# Patient Record
Sex: FEMALE | Race: WHITE | NOT HISPANIC OR LATINO | Employment: FULL TIME | URBAN - METROPOLITAN AREA
[De-identification: names, ages, dates, MRNs, and addresses within clinical notes are randomized per-mention and may not be internally consistent; named-entity substitution may affect disease eponyms.]

---

## 2017-05-24 ENCOUNTER — OFFICE VISIT (OUTPATIENT)
Dept: FAMILY MEDICINE CLINIC | Facility: CLINIC | Age: 22
End: 2017-05-24

## 2017-05-24 VITALS
BODY MASS INDEX: 42.81 KG/M2 | HEIGHT: 68 IN | DIASTOLIC BLOOD PRESSURE: 82 MMHG | WEIGHT: 282.5 LBS | OXYGEN SATURATION: 98 % | HEART RATE: 72 BPM | SYSTOLIC BLOOD PRESSURE: 122 MMHG | RESPIRATION RATE: 16 BRPM

## 2017-05-24 DIAGNOSIS — F33.2 SEVERE EPISODE OF RECURRENT MAJOR DEPRESSIVE DISORDER, WITHOUT PSYCHOTIC FEATURES (HCC): Primary | ICD-10-CM

## 2017-05-24 PROCEDURE — 99213 OFFICE O/P EST LOW 20 MIN: CPT | Performed by: INTERNAL MEDICINE

## 2017-05-24 RX ORDER — DULOXETIN HYDROCHLORIDE 30 MG/1
30 CAPSULE, DELAYED RELEASE ORAL DAILY
Qty: 30 CAPSULE | Refills: 0 | Status: SHIPPED | OUTPATIENT
Start: 2017-05-24 | End: 2017-05-31

## 2017-05-31 ENCOUNTER — OFFICE VISIT (OUTPATIENT)
Dept: FAMILY MEDICINE CLINIC | Facility: CLINIC | Age: 22
End: 2017-05-31

## 2017-05-31 VITALS
DIASTOLIC BLOOD PRESSURE: 68 MMHG | RESPIRATION RATE: 16 BRPM | OXYGEN SATURATION: 99 % | BODY MASS INDEX: 41.94 KG/M2 | SYSTOLIC BLOOD PRESSURE: 100 MMHG | TEMPERATURE: 97.8 F | HEIGHT: 68 IN | HEART RATE: 76 BPM | WEIGHT: 276.7 LBS

## 2017-05-31 DIAGNOSIS — F33.2 SEVERE EPISODE OF RECURRENT MAJOR DEPRESSIVE DISORDER, WITHOUT PSYCHOTIC FEATURES (HCC): Primary | ICD-10-CM

## 2017-05-31 PROCEDURE — 99213 OFFICE O/P EST LOW 20 MIN: CPT | Performed by: INTERNAL MEDICINE

## 2017-05-31 RX ORDER — DULOXETIN HYDROCHLORIDE 60 MG/1
60 CAPSULE, DELAYED RELEASE ORAL DAILY
Qty: 30 CAPSULE | Refills: 2 | Status: SHIPPED | OUTPATIENT
Start: 2017-05-31 | End: 2017-05-31 | Stop reason: SDUPTHER

## 2017-05-31 RX ORDER — DULOXETIN HYDROCHLORIDE 60 MG/1
60 CAPSULE, DELAYED RELEASE ORAL DAILY
Qty: 30 CAPSULE | Refills: 2 | Status: SHIPPED | OUTPATIENT
Start: 2017-05-31 | End: 2017-06-29 | Stop reason: SDUPTHER

## 2017-06-20 RX ORDER — DULOXETIN HYDROCHLORIDE 30 MG/1
CAPSULE, DELAYED RELEASE ORAL
Qty: 30 CAPSULE | Refills: 6 | Status: SHIPPED | OUTPATIENT
Start: 2017-06-20 | End: 2017-06-29

## 2017-06-29 ENCOUNTER — TELEPHONE (OUTPATIENT)
Dept: FAMILY MEDICINE CLINIC | Facility: CLINIC | Age: 22
End: 2017-06-29

## 2017-06-29 RX ORDER — DULOXETIN HYDROCHLORIDE 60 MG/1
60 CAPSULE, DELAYED RELEASE ORAL DAILY
Qty: 30 CAPSULE | Refills: 2 | Status: SHIPPED | OUTPATIENT
Start: 2017-06-29 | End: 2017-07-12 | Stop reason: SDUPTHER

## 2017-06-29 NOTE — TELEPHONE ENCOUNTER
Pt called said you upped the cymbalta to 60 mg and working but sent in for 30 mg. Can you resend it in to [facility] please. thanks

## 2017-07-12 ENCOUNTER — OFFICE VISIT (OUTPATIENT)
Dept: FAMILY MEDICINE CLINIC | Facility: CLINIC | Age: 22
End: 2017-07-12

## 2017-07-12 VITALS
RESPIRATION RATE: 16 BRPM | TEMPERATURE: 98.1 F | HEART RATE: 79 BPM | OXYGEN SATURATION: 98 % | BODY MASS INDEX: 42.39 KG/M2 | HEIGHT: 68 IN | WEIGHT: 279.7 LBS | DIASTOLIC BLOOD PRESSURE: 78 MMHG | SYSTOLIC BLOOD PRESSURE: 122 MMHG

## 2017-07-12 DIAGNOSIS — R40.0 SLEEPINESS: ICD-10-CM

## 2017-07-12 DIAGNOSIS — F33.2 SEVERE EPISODE OF RECURRENT MAJOR DEPRESSIVE DISORDER, WITHOUT PSYCHOTIC FEATURES (HCC): Primary | ICD-10-CM

## 2017-07-12 PROCEDURE — 99213 OFFICE O/P EST LOW 20 MIN: CPT | Performed by: INTERNAL MEDICINE

## 2017-07-12 RX ORDER — DULOXETIN HYDROCHLORIDE 60 MG/1
60 CAPSULE, DELAYED RELEASE ORAL DAILY
Qty: 90 CAPSULE | Refills: 1 | Status: SHIPPED | OUTPATIENT
Start: 2017-07-12 | End: 2017-07-24 | Stop reason: SDUPTHER

## 2017-07-12 NOTE — PROGRESS NOTES
"Subjective   Patient ID: Davida Case is a 22 y.o. female presents with   Chief Complaint   Patient presents with   • Follow-up     on medications       HPI - This patient presents today follow-up for depression overall she somewhat better with Cymbalta but she's complaining primarily of fatigue she sleeps 12 hours a day and still is suffering from severe sleepiness.  She has no suicidal or homicidal ideations    Assessment plan    Depression improved with Cymbalta.  We'll set her up with psychology    Daytime sleepiness-set up with sleep medicine    Allergies   Allergen Reactions   • No Known Drug Allergy        The following portions of the patient's history were reviewed and updated as appropriate: allergies, current medications, past family history, past medical history, past social history, past surgical history and problem list.      Review of Systems   Constitutional: Positive for fatigue.   Respiratory: Negative.    Cardiovascular: Negative.    Psychiatric/Behavioral: Positive for dysphoric mood and sleep disturbance. Negative for suicidal ideas.       Objective     Vitals:    07/12/17 1245   BP: 122/78   Pulse: 79   Resp: 16   Temp: 98.1 °F (36.7 °C)   TempSrc: Oral   SpO2: 98%   Weight: 279 lb 11.2 oz (127 kg)   Height: 68\" (172.7 cm)         Physical Exam   Constitutional: She appears well-developed and well-nourished.   Overweight   Psychiatric: She has a normal mood and affect. Her behavior is normal.   Nursing note and vitals reviewed.        Davida was seen today for follow-up.    Diagnoses and all orders for this visit:    Severe episode of recurrent major depressive disorder, without psychotic features  -     Ambulatory Referral to Psychology    Sleepiness  -     Ambulatory Referral to Sleep Medicine        Call or return to clinic prn if these symptoms worsen or fail to improve as anticipated.  "

## 2017-07-24 RX ORDER — DULOXETIN HYDROCHLORIDE 60 MG/1
60 CAPSULE, DELAYED RELEASE ORAL DAILY
Qty: 90 CAPSULE | Refills: 1 | Status: SHIPPED | OUTPATIENT
Start: 2017-07-24 | End: 2017-08-29

## 2017-08-04 ENCOUNTER — OFFICE VISIT (OUTPATIENT)
Dept: FAMILY MEDICINE CLINIC | Facility: CLINIC | Age: 22
End: 2017-08-04

## 2017-08-04 VITALS
WEIGHT: 287 LBS | TEMPERATURE: 98.1 F | BODY MASS INDEX: 43.5 KG/M2 | HEIGHT: 68 IN | SYSTOLIC BLOOD PRESSURE: 112 MMHG | HEART RATE: 82 BPM | DIASTOLIC BLOOD PRESSURE: 84 MMHG | OXYGEN SATURATION: 98 %

## 2017-08-04 DIAGNOSIS — R05.9 COUGH: Primary | ICD-10-CM

## 2017-08-04 PROCEDURE — 99213 OFFICE O/P EST LOW 20 MIN: CPT | Performed by: FAMILY MEDICINE

## 2017-08-04 RX ORDER — AMOXICILLIN 875 MG/1
875 TABLET, COATED ORAL 2 TIMES DAILY
Qty: 14 TABLET | Refills: 0 | Status: SHIPPED | OUTPATIENT
Start: 2017-08-04 | End: 2017-08-29

## 2017-08-04 NOTE — PATIENT INSTRUCTIONS
This is a very nice 22-year-old who is here with deep cough and chest congestion.  I will send out a prescription for amoxicillin but would like her to call Monday if she is not feeling better.

## 2017-08-04 NOTE — PROGRESS NOTES
Subjective   Davida Case is a 22 y.o. female presenting with   Chief Complaint   Patient presents with   • URI     coughing up mucus  sore throat         HPI Comments: 22-year-old single white female nonsmoker comes in today with a 6 day history of deep cough that is productive of mucus.  She says instead of getting better she has now developed sore throat and nasal congestion in the past 2 days.  She has not been exposed to anyone with strep.  She does not have any gastrointestinal symptoms.  She does not have severe headache or high fever or chills or chest pain or shortness of breath.    URI    Associated symptoms include congestion, coughing, rhinorrhea and a sore throat.        The following portions of the patient's history were reviewed and updated as appropriate: current medications, past family history, past medical history, past social history, past surgical history and problem list.    Review of Systems   HENT: Positive for congestion, postnasal drip, rhinorrhea and sore throat.    Respiratory: Positive for cough.    All other systems reviewed and are negative.      Objective   Physical Exam   Constitutional: She is oriented to person, place, and time. She appears well-developed and well-nourished. No distress.   HENT:   Head: Normocephalic and atraumatic.   Nose: Mucosal edema and rhinorrhea present.   Mouth/Throat: Mucous membranes are normal. Posterior oropharyngeal erythema present. No oropharyngeal exudate or posterior oropharyngeal edema.   Eyes: EOM are normal. Pupils are equal, round, and reactive to light. No scleral icterus.   Neck: Normal range of motion. Neck supple. No JVD present. No thyromegaly present.   Cardiovascular: Normal rate, regular rhythm, normal heart sounds and intact distal pulses.    No murmur heard.  Pulmonary/Chest: Effort normal. No respiratory distress. She has no wheezes. She has rhonchi in the right upper field and the left upper field. She has no rales.    Musculoskeletal: Normal range of motion.   Lymphadenopathy:     She has no cervical adenopathy.   Neurological: She is alert and oriented to person, place, and time.   Skin: Skin is warm and dry. She is not diaphoretic.   Psychiatric: She has a normal mood and affect. Her behavior is normal.   Nursing note and vitals reviewed.      Assessment/Plan   Davida was seen today for uri.    Diagnoses and all orders for this visit:    Cough    Other orders  -     amoxicillin (AMOXIL) 875 MG tablet; Take 1 tablet by mouth 2 (Two) Times a Day.                   I would like him to return for another visit in 6 month(s)

## 2017-08-28 ENCOUNTER — TRANSCRIBE ORDERS (OUTPATIENT)
Dept: PULMONOLOGY | Facility: HOSPITAL | Age: 22
End: 2017-08-28

## 2017-08-28 ENCOUNTER — HOSPITAL ENCOUNTER (OUTPATIENT)
Dept: SLEEP MEDICINE | Facility: HOSPITAL | Age: 22
Discharge: HOME | End: 2017-08-28
Admitting: INTERNAL MEDICINE

## 2017-08-28 DIAGNOSIS — R40.0 SLEEPINESS: ICD-10-CM

## 2017-08-28 DIAGNOSIS — G47.33 OSA (OBSTRUCTIVE SLEEP APNEA): Primary | ICD-10-CM

## 2017-08-28 PROCEDURE — G0463 HOSPITAL OUTPT CLINIC VISIT: HCPCS

## 2017-08-29 ENCOUNTER — OFFICE VISIT (OUTPATIENT)
Dept: FAMILY MEDICINE CLINIC | Facility: CLINIC | Age: 22
End: 2017-08-29

## 2017-08-29 VITALS
HEART RATE: 92 BPM | RESPIRATION RATE: 16 BRPM | OXYGEN SATURATION: 99 % | WEIGHT: 290 LBS | TEMPERATURE: 98 F | HEIGHT: 68 IN | SYSTOLIC BLOOD PRESSURE: 110 MMHG | BODY MASS INDEX: 43.95 KG/M2 | DIASTOLIC BLOOD PRESSURE: 80 MMHG

## 2017-08-29 DIAGNOSIS — R40.0 SLEEPINESS: ICD-10-CM

## 2017-08-29 DIAGNOSIS — F33.2 SEVERE EPISODE OF RECURRENT MAJOR DEPRESSIVE DISORDER, WITHOUT PSYCHOTIC FEATURES (HCC): Primary | ICD-10-CM

## 2017-08-29 PROCEDURE — 99213 OFFICE O/P EST LOW 20 MIN: CPT | Performed by: INTERNAL MEDICINE

## 2017-08-29 NOTE — PROGRESS NOTES
"Subjective   Patient ID: Davida Case is a 22 y.o. female presents with   Chief Complaint   Patient presents with   • Fatigue     always tired wants her depression medication changed       HPI - This patient has a history of depression.  She has been on Cymbalta but it is not working and she's having side effects to it.  She had been on trintillex in the past and it worked well for her.  She also has failed Celexa in the past.  She is set up for a sleep study as well.    Assessment plan    Depression-start trintillex 10 mg and then we'll titrate up to 20 if necessary.  If the medicines not working or if she has any problem with the medicine such as feeling worse she is to stop the medicine and call me she agreed.    Sleepiness she is set up for a sleep study.    Allergies   Allergen Reactions   • No Known Drug Allergy        The following portions of the patient's history were reviewed and updated as appropriate: allergies, current medications, past family history, past medical history, past social history, past surgical history and problem list.      Review of Systems   Constitutional: Positive for fatigue.   Respiratory: Negative.    Cardiovascular: Negative.    Psychiatric/Behavioral: Positive for dysphoric mood and sleep disturbance. Negative for suicidal ideas.       Objective     Vitals:    08/29/17 1308   BP: 110/80   Pulse: 92   Resp: 16   Temp: 98 °F (36.7 °C)   TempSrc: Oral   SpO2: 99%   Weight: 290 lb (132 kg)   Height: 68\" (172.7 cm)         Physical Exam   Constitutional: She is oriented to person, place, and time. She appears well-developed and well-nourished.   Neurological: She is alert and oriented to person, place, and time.   Psychiatric: She has a normal mood and affect. Her behavior is normal.   Nursing note and vitals reviewed.        Davida was seen today for fatigue.    Diagnoses and all orders for this visit:    Severe episode of recurrent major depressive disorder, without psychotic " features    Sleepiness    Other orders  -     Discontinue: Vortioxetine HBr (TRINTELLIX) 20 MG tablet; Take 20 mg by mouth Daily.  -     Vortioxetine HBr (TRINTELLIX) 20 MG tablet; Take 20 mg by mouth Daily.        Call or return to clinic prn if these symptoms worsen or fail to improve as anticipated.

## 2017-08-30 NOTE — CONSULTS
Group: [facility]         CONSULT NOTE    Patient Identification:  Davida Case  22 y.o.  female  1995  [id number]            Requesting physician: luis    Reason for Consultation:  hypersomnia    CC:     History of Present Illness:  Presents with fatigue and sleepiness.snoring but no wit apnea.no chocking.reports sleep talking.no etoh abuse or rls.hoarseness.wt gain of 80lbs.morning headache with grinding his teeth.      Review of Systems  negative  Past Medical History:  Past Medical History:   Diagnosis Date   • ADHD (attention deficit hyperactivity disorder)    • Anxiety    • Depression        Past Surgical History:  Past Surgical History:   Procedure Laterality Date   • ADENOIDECTOMY     • CHOLECYSTECTOMY     • EAR TUBES     • TONSILLECTOMY     • WISDOM TOOTH EXTRACTION          Home Meds:    (Not in a hospital admission)    Allergies:  Allergies   Allergen Reactions   • No Known Drug Allergy        Social History:   Social History     Social History   • Marital status: Single     Spouse name: N/A   • Number of children: N/A   • Years of education: N/A     Occupational History   • Not on file.     Social History Main Topics   • Smoking status: Never Smoker   • Smokeless tobacco: Never Used   • Alcohol use No   • Drug use: No   • Sexual activity: Defer     Other Topics Concern   • Not on file     Social History Narrative       Family History:  Family History   Problem Relation Age of Onset   • Thyroid disease Mother    • Depression Mother    • Depression Father    • Hypertension Father    • Hyperlipidemia Father    • Cancer Maternal Grandmother    • Pancreatic cancer Maternal Grandmother        Physical Exam:  There were no vitals taken for this visit. There is no height or weight on file to calculate BMI.      Physical Exam  No distress alert and oriented  ent mallamaptti 3-4  Chest clear  cvr r/r/r  abd benign  Ext no edema  Cns no deficits  Lab Review:   LABS:  Lab Results   Component  Value Date    CALCIUM 9.9 10/05/2015       No results found for: CKTOTAL, CKMB, CKMBINDEX, TROPONINI, TROPONINT                Lab Results   Component Value Date    TSH 2.59 10/05/2015     CrCl cannot be calculated (Patient's most recent sCr result is older than the maximum 30 days allowed.).         Imaging: I personally visualized the images of scans/x-rays performed within last 3 days.      Assessment:  Hypersomnia  Obesity    Recommendations:  Educated on mary beth  Sleep hygiene  Wt loss  Explained mary beth with cardiac comorbidities  HST and f up              Joanna Marsh MD  8/30/2017  4:07 PM      Much of this encounter note is an electronic transcription/translation of spoken language to printed text using Dragon Software.

## 2017-09-21 ENCOUNTER — HOSPITAL ENCOUNTER (OUTPATIENT)
Dept: SLEEP MEDICINE | Facility: HOSPITAL | Age: 22
Discharge: HOME | End: 2017-09-21
Admitting: INTERNAL MEDICINE

## 2017-09-21 DIAGNOSIS — G47.33 OSA (OBSTRUCTIVE SLEEP APNEA): ICD-10-CM

## 2017-09-21 PROCEDURE — 95806 SLEEP STUDY UNATT&RESP EFFT: CPT

## 2017-10-09 ENCOUNTER — TELEPHONE (OUTPATIENT)
Dept: SLEEP MEDICINE | Facility: HOSPITAL | Age: 22
End: 2017-10-09

## 2017-10-09 NOTE — TELEPHONE ENCOUNTER
Pt called to inquire about ss results.  Results were in, although tech could not find pt's chart.  Tech discussed ss with pt and scheduled f/u appt with Dr. Marsh

## 2017-11-06 ENCOUNTER — OFFICE VISIT (OUTPATIENT)
Dept: SLEEP MEDICINE | Facility: HOSPITAL | Age: 22
End: 2017-11-06
Attending: INTERNAL MEDICINE

## 2017-11-06 VITALS
DIASTOLIC BLOOD PRESSURE: 69 MMHG | SYSTOLIC BLOOD PRESSURE: 118 MMHG | HEART RATE: 74 BPM | WEIGHT: 293 LBS | BODY MASS INDEX: 43.4 KG/M2 | HEIGHT: 69 IN

## 2017-11-06 DIAGNOSIS — G47.33 OSA (OBSTRUCTIVE SLEEP APNEA): Primary | ICD-10-CM

## 2017-11-06 PROCEDURE — G0463 HOSPITAL OUTPT CLINIC VISIT: HCPCS

## 2017-11-07 ENCOUNTER — TELEPHONE (OUTPATIENT)
Dept: SLEEP MEDICINE | Facility: HOSPITAL | Age: 22
End: 2017-11-07

## 2017-11-07 NOTE — TELEPHONE ENCOUNTER
Tech called pt to inform received order for pap equipment. No answer, tech left vm to inform sent orders to [facility] for pap equipment and to return call to schedule F/U appt. MAB

## 2017-12-18 ENCOUNTER — APPOINTMENT (OUTPATIENT)
Dept: SLEEP MEDICINE | Facility: HOSPITAL | Age: 22
End: 2017-12-18
Attending: INTERNAL MEDICINE

## 2018-07-23 ENCOUNTER — OFFICE VISIT (OUTPATIENT)
Dept: OBSTETRICS AND GYNECOLOGY | Facility: CLINIC | Age: 23
End: 2018-07-23

## 2018-07-23 VITALS — WEIGHT: 293 LBS | HEIGHT: 69 IN | BODY MASS INDEX: 43.4 KG/M2

## 2018-07-23 DIAGNOSIS — Z01.419 WELL WOMAN EXAM WITH ROUTINE GYNECOLOGICAL EXAM: ICD-10-CM

## 2018-07-23 DIAGNOSIS — Z13.9 SCREENING FOR CONDITION: Primary | ICD-10-CM

## 2018-07-23 DIAGNOSIS — Z00.00 ANNUAL PHYSICAL EXAM: ICD-10-CM

## 2018-07-23 LAB
B-HCG UR QL: NEGATIVE
BILIRUB BLD-MCNC: NEGATIVE MG/DL
CLARITY, POC: CLEAR
COLOR UR: YELLOW
GLUCOSE UR STRIP-MCNC: NEGATIVE MG/DL
INTERNAL NEGATIVE CONTROL: NEGATIVE
INTERNAL POSITIVE CONTROL: POSITIVE
KETONES UR QL: NEGATIVE
LEUKOCYTE EST, POC: NEGATIVE
Lab: NORMAL
NITRITE UR-MCNC: NEGATIVE MG/ML
PH UR: 5 [PH] (ref 5–8)
PROT UR STRIP-MCNC: NEGATIVE MG/DL
RBC # UR STRIP: NEGATIVE /UL
SP GR UR: 1 (ref 1–1.03)
UROBILINOGEN UR QL: NORMAL

## 2018-07-23 PROCEDURE — 81002 URINALYSIS NONAUTO W/O SCOPE: CPT | Performed by: OBSTETRICS & GYNECOLOGY

## 2018-07-23 PROCEDURE — 81025 URINE PREGNANCY TEST: CPT | Performed by: OBSTETRICS & GYNECOLOGY

## 2018-07-23 PROCEDURE — 99385 PREV VISIT NEW AGE 18-39: CPT | Performed by: OBSTETRICS & GYNECOLOGY

## 2018-07-23 NOTE — PROGRESS NOTES
"GYN Annual Exam     CC- Here for annual exam.     Pt new to practice? yes  Pt new to me? yes           Davida Mason is a 23 y.o. female who presents for annual well woman exam. Patient's last menstrual period was 06/23/2018.      Problems:  Patient Active Problem List   Diagnosis   • Depression   • Sleepiness   • Cough   ; otherwise none    OB History     No data available            Past Medical History:   Diagnosis Date   • Abnormal Pap smear of cervix    • ADHD (attention deficit hyperactivity disorder)    • Anxiety    • Depression    • HPV in female        Past Surgical History:   Procedure Laterality Date   • ADENOIDECTOMY     • CHOLECYSTECTOMY     • EAR TUBES     • TONSILLECTOMY     • WISDOM TOOTH EXTRACTION         No current outpatient prescriptions on file.    Allergies   Allergen Reactions   • No Known Drug Allergy        Social History   Substance Use Topics   • Smoking status: Never Smoker   • Smokeless tobacco: Never Used   • Alcohol use No     Counseling given: Not Answered      Family History   Problem Relation Age of Onset   • Thyroid disease Mother    • Depression Mother    • Hypertension Father    • Hyperlipidemia Father    • Cancer Maternal Grandmother    • Pancreatic cancer Maternal Grandmother    • Pancreatitis Maternal Grandmother    • No Known Problems Brother    • No Known Problems Sister    • Ovarian cancer Other          Ht 175.3 cm (69\")   Wt 134 kg (295 lb 3.2 oz)   LMP 06/23/2018   BMI 43.59 kg/m²     Physical Exam   Constitutional: She is oriented to person, place, and time. She appears well-developed and well-nourished.   HENT:   Head: Normocephalic and atraumatic.   Neck: Normal range of motion. Neck supple. No thyromegaly present.   Cardiovascular: Normal rate and regular rhythm.    Pulmonary/Chest: Effort normal and breath sounds normal. Right breast exhibits no mass and no nipple discharge. Left breast exhibits no mass and no nipple discharge. Breasts are symmetrical. There is " no breast swelling.   Abdominal: Soft. Bowel sounds are normal. She exhibits no distension and no mass. There is no tenderness. There is no rebound and no guarding.   Genitourinary: Vagina normal and uterus normal. No breast tenderness, discharge or bleeding. Pelvic exam was performed with patient prone. There is no lesion on the right labia. There is no lesion on the left labia. Cervix exhibits no motion tenderness and no discharge. Right adnexum displays no mass. Left adnexum displays no mass.   Genitourinary Comments: cx wnl, pap done   Musculoskeletal: Normal range of motion. She exhibits no edema.   Neurological: She is alert and oriented to person, place, and time.   Skin: Skin is warm and dry.   Breasts wnl, bilaterally   Psychiatric: She has a normal mood and affect. Her behavior is normal. Judgment and thought content normal.   Nursing note and vitals reviewed.       As part of wellness and prevention, the following topics were discussed with the patient:  []  Nutrition  []  Physical activity/regular exercise   []  Healthy weight  []  Injury prevention  []  Substance misuse/abuse  []  Sexual behavior  []  STD prevention  []  Contaception  []  Dental health  []  Mental health  []  Immunization  [x]  Encouraged SBE     Counseling and guidance discussed:  Nutrition, family planning/contraception, physical activity, healthy weight, injury prevention, misuse of tobacco, alcohol and drugs, sexual behavior and STDs, dental health, mental health, immunizations, screenings, breast cancer and breast exams.      Assessment     1) GYN annual well woman exam.   2) PAP done today? yes  3) problems: none       Plan       Follow up prn and one year.    Davida was seen today for gynecologic exam.    Diagnoses and all orders for this visit:    Screening for condition  -     POC Urinalysis Dipstick  -     POC Pregnancy, Urine    Well woman exam with routine gynecological exam  -     Pap IG, Ct-Ng TV Rfx HPV ASCU    Annual  physical exam        RTO Return in about 1 year (around 7/23/2019) for Annual physical.        Alcon Mckenna MD    7/23/2018  10:38 AM

## 2018-07-27 LAB
C TRACH RRNA CVX QL NAA+PROBE: NEGATIVE
CONV .: NORMAL
CYTOLOGIST CVX/VAG CYTO: NORMAL
CYTOLOGY CVX/VAG DOC THIN PREP: NORMAL
DX ICD CODE: NORMAL
HIV 1 & 2 AB SER-IMP: NORMAL
Lab: NORMAL
N GONORRHOEA RRNA CVX QL NAA+PROBE: NEGATIVE
OTHER STN SPEC: NORMAL
PATH REPORT.FINAL DX SPEC: NORMAL
STAT OF ADQ CVX/VAG CYTO-IMP: NORMAL
T VAGINALIS RRNA SPEC QL NAA+PROBE: NEGATIVE

## 2018-07-31 ENCOUNTER — OFFICE VISIT (OUTPATIENT)
Dept: FAMILY MEDICINE CLINIC | Facility: CLINIC | Age: 23
End: 2018-07-31

## 2018-07-31 VITALS
OXYGEN SATURATION: 99 % | HEIGHT: 69 IN | BODY MASS INDEX: 43.4 KG/M2 | DIASTOLIC BLOOD PRESSURE: 60 MMHG | HEART RATE: 56 BPM | TEMPERATURE: 98 F | WEIGHT: 293 LBS | SYSTOLIC BLOOD PRESSURE: 115 MMHG

## 2018-07-31 DIAGNOSIS — R53.83 FATIGUE, UNSPECIFIED TYPE: ICD-10-CM

## 2018-07-31 DIAGNOSIS — F33.2 SEVERE EPISODE OF RECURRENT MAJOR DEPRESSIVE DISORDER, WITHOUT PSYCHOTIC FEATURES (HCC): Primary | ICD-10-CM

## 2018-07-31 DIAGNOSIS — R73.09 ELEVATED GLUCOSE: ICD-10-CM

## 2018-07-31 LAB
25(OH)D3+25(OH)D2 SERPL-MCNC: 19.5 NG/ML (ref 30–100)
BUN SERPL-MCNC: 13 MG/DL (ref 6–20)
BUN/CREAT SERPL: 17.8 (ref 7–25)
CALCIUM SERPL-MCNC: 9.6 MG/DL (ref 8.6–10.5)
CHLORIDE SERPL-SCNC: 105 MMOL/L (ref 98–107)
CO2 SERPL-SCNC: 22.8 MMOL/L (ref 22–29)
CREAT SERPL-MCNC: 0.73 MG/DL (ref 0.57–1)
GLUCOSE SERPL-MCNC: 104 MG/DL (ref 65–99)
HBA1C MFR BLD: 5.43 % (ref 4.8–5.6)
POTASSIUM SERPL-SCNC: 4.6 MMOL/L (ref 3.5–5.2)
SODIUM SERPL-SCNC: 142 MMOL/L (ref 136–145)
T4 FREE SERPL-MCNC: 1.17 NG/DL (ref 0.93–1.7)
TSH SERPL DL<=0.005 MIU/L-ACNC: 1.72 MIU/ML (ref 0.27–4.2)

## 2018-07-31 PROCEDURE — 99214 OFFICE O/P EST MOD 30 MIN: CPT | Performed by: NURSE PRACTITIONER

## 2018-07-31 RX ORDER — ERGOCALCIFEROL 1.25 MG/1
50000 CAPSULE ORAL WEEKLY
Qty: 12 CAPSULE | Refills: 1 | Status: SHIPPED | OUTPATIENT
Start: 2018-07-31 | End: 2019-02-07

## 2018-07-31 NOTE — PROGRESS NOTES
Francis Higuera Case is a 23 y.o. female presents to get established. Has a history of depression and ADHD. Previously treated with four different meds, unable to recall names at [facility]. States she was discharged because she was not taking medication as prescribed. States she can barely remember one pill, let alone four. Interested in resuming some medication. Agreeable to follow up with psych for further issues. Does report mixed depression and anxiety. Has not been prescribed adhd medication in the past. Discussed wellbutrin, but may worsen anxiety.     Tried trintellix, unable to tolerate side effects.  Has tried cymbalta with little relief.   Not taking anything currently.       Depression   Visit Type: initial  Onset of symptoms: more than 1 year ago  Progression since onset: gradually worsening  Patient presents with the following symptoms: anhedonia, decreased concentration, depressed mood, excessive worry, fatigue, feelings of hopelessness, feelings of worthlessness, hypersomnia, irritability and nervousness/anxiety.  Patient is not experiencing: insomnia, suicidal ideas, suicidal planning, thoughts of death, weight gain and weight loss.  Frequency of symptoms: most days   Severity: moderate   Sleep quality: good  Nighttime awakenings: occasional  Patient has a history of: anxiety/panic attacks and depression  Treatment tried: SSRI, non-SSRI antidepressants and individual therapy  Compliance with treatment: variable  Improvement on treatment: moderate  Past compliance problems: difficulty with treatment plan           The following portions of the patient's history were reviewed and updated as appropriate: allergies, current medications, past family history, past medical history, past social history, past surgical history and problem list.    Review of Systems   Constitutional: Positive for fatigue (sleep apnea, unable to afford machine) and irritability. Negative for weight gain  and weight loss.   HENT: Negative.    Eyes: Negative.    Respiratory: Negative.    Cardiovascular: Negative.    Gastrointestinal: Negative.    Endocrine: Negative.    Genitourinary: Negative.    Musculoskeletal: Negative.    Skin: Negative.    Allergic/Immunologic: Negative.    Neurological: Negative.    Hematological: Negative.    Psychiatric/Behavioral: Positive for decreased concentration. Negative for suicidal ideas. The patient is nervous/anxious. The patient does not have insomnia.        Objective   Physical Exam   Constitutional: She is oriented to person, place, and time. She appears well-developed and well-nourished.   HENT:   Head: Normocephalic and atraumatic.   Right Ear: Tympanic membrane and ear canal normal.   Left Ear: Tympanic membrane and ear canal normal.   Nose: Nose normal.   Mouth/Throat: Uvula is midline and oropharynx is clear and moist.   Eyes: Pupils are equal, round, and reactive to light.   Neck: Neck supple.   Cardiovascular: Normal rate, regular rhythm and normal heart sounds.  Exam reveals no gallop and no friction rub.    No murmur heard.  Pulmonary/Chest: Effort normal and breath sounds normal. No respiratory distress. She has no wheezes. She has no rales.   Abdominal: Soft. Bowel sounds are normal. She exhibits no distension. There is no tenderness.   Neurological: She is alert and oriented to person, place, and time.   Skin: Skin is warm and dry.   Psychiatric: She has a normal mood and affect.   Vitals reviewed.      Assessment/Plan   Davida was seen today for depression.    Diagnoses and all orders for this visit:    Severe episode of recurrent major depressive disorder, without psychotic features (CMS/HCC)  -     sertraline (ZOLOFT) 50 MG tablet; Take 1 tablet by mouth Daily.  -     TSH  -     T4, free  -     Vitamin D 25 hydroxy    Elevated glucose  -     Basic metabolic panel  -     Hemoglobin A1c    Fatigue, unspecified type  -     TSH  -     T4, free    will start zoloft,  recommend further management with psych.   Discussed potential addition of wellbutrin with ADHD, but may worsen anxiety.  Will check thyroid and BMP, previous elevated glucose, will check a1c  Patient to follow up in six weeks, if she is able to get in with psych, she may follow up with them for her treatment.

## 2018-07-31 NOTE — PATIENT INSTRUCTIONS
[facility] [phone], Dr. Pacheco or NP  [facility] [phone]

## 2018-10-23 ENCOUNTER — TELEPHONE (OUTPATIENT)
Dept: FAMILY MEDICINE CLINIC | Facility: CLINIC | Age: 23
End: 2018-10-23

## 2018-10-23 NOTE — TELEPHONE ENCOUNTER
Pt called and said that she wanted to get the pneumonia shot but the  transferred her back to us. Is it ok for her to get it?

## 2018-10-23 NOTE — TELEPHONE ENCOUNTER
Indications for a pneumonia vaccine include smoker, immunocompromised. I do not see an indication for the vaccine. Insurance may not cover the shot.

## 2018-12-03 ENCOUNTER — OFFICE VISIT (OUTPATIENT)
Dept: SLEEP MEDICINE | Facility: HOSPITAL | Age: 23
End: 2018-12-03
Attending: INTERNAL MEDICINE

## 2018-12-03 VITALS
BODY MASS INDEX: 43.25 KG/M2 | WEIGHT: 292 LBS | HEART RATE: 67 BPM | HEIGHT: 69 IN | DIASTOLIC BLOOD PRESSURE: 90 MMHG | SYSTOLIC BLOOD PRESSURE: 145 MMHG

## 2018-12-03 DIAGNOSIS — G47.33 OSA (OBSTRUCTIVE SLEEP APNEA): Primary | ICD-10-CM

## 2018-12-03 PROCEDURE — G0463 HOSPITAL OUTPT CLINIC VISIT: HCPCS

## 2018-12-03 NOTE — PROGRESS NOTES
"[facility]         Dr Joanna Marsh  [unfilled]  Patient Care Team:  Laurie Wheatley APRN as PCP - General (Family Medicine)    Chief Complaint:Obstructive sleep apnea syndrome with depression  Overall apnea index of 6.9 events per hour  Apnea index in supine positioning 7.2 events per hour  Apnea index on the left position was 7.8 events per hour      Interval History: Patient returns for follow-up after sleep study.  She was last seen a year ago and based on the sleep study she was recommended to have an auto CPAP.  Patient tells me she did not have the money and therefore did not follow-up.  She does not have a CPAP machine.  Currently had discussed set up for treatment of SANDOVAL.  As you recall she had mild SANDOVAL with results of which are above.  She feels tired and sleepy.  Bedtime 8 PM awake times 6 AM.  Gets about 8-10 hours of sleep and still feels tired.  No night sweats or fever.  No tobacco abuse alcohol abuse in abuse.  Warren 11 out of 24 consistent with sleepiness    REVIEW OF SYSTEMS:   CARDIOVASCULAR: No chest pain, chest pressure or chest discomfort. No palpitations or edema.   RESPIRATORY: No shortness of breath, cough or sputum.   GASTROINTESTINAL: No anorexia, nausea, vomiting or diarrhea. No abdominal pain or blood.   HEMATOLOGIC: No bleeding or bruising.     Ventilator/Non-Invasive Ventilation Settings (From admission, onward)    None            Vital Signs  Heart Rate:  [67] 67  BP: (145)/(90) 145/90  [unfilled]  Flowsheet Rows      First Filed Value   Admission Height  175.3 cm (69\") Documented at 12/03/2018 1500   Admission Weight  132 kg (292 lb) Documented at 12/03/2018 1500          Physical Exam:   General Appearance:    Alert, cooperative, in no acute distress  ENT Mallampati between 3 and 4    Lungs:     Clear to auscultation,respirations regular, even and                  unlabored    Heart:    Regular rhythm and normal rate, normal S1 and S2, no            " murmur, no gallop, no rub, no click   Chest Wall:    No abnormalities observed   Abdomen:     Normal bowel sounds, no masses, no organomegaly, soft        non-tender, non-distended, no guarding, no rebound                tenderness   Extremities:   Moves all extremities well, no edema, no cyanosis, no             redness     Results Review:                                          I reviewed the patient's new clinical results.  I personally viewed and interpreted the patient's CXR        Medication Review:         No current facility-administered medications for this visit.     ASSESSMENT:   SANDOVAL  ADHD  Depression    PLAN:  Reviewed sleep study result once again with the patient  Discussed treatment options including CPAP versus mandibular advancement device  Based on cost etc. patient prefers to go with auto CPAP  We'll set up on auto CPAP 8-20 cm with heated humidity and ramp  Weight loss encouraged  Sleep hygiene measures  Follow-up and 30+ days for compliance download  Joanna Marsh MD  12/03/18  3:47 PM

## 2018-12-04 ENCOUNTER — TELEPHONE (OUTPATIENT)
Dept: SLEEP MEDICINE | Facility: HOSPITAL | Age: 23
End: 2018-12-04

## 2018-12-04 NOTE — TELEPHONE ENCOUNTER
Pt in clinic to review discuss starting CPAP after 2017 HST revealed SANDOVAL.  Tech faxed set up to [facility].  Pt has follow up for compliance end of January 2019. [email or web address]

## 2019-01-28 ENCOUNTER — APPOINTMENT (OUTPATIENT)
Dept: SLEEP MEDICINE | Facility: HOSPITAL | Age: 24
End: 2019-01-28
Attending: INTERNAL MEDICINE

## 2019-02-05 ENCOUNTER — OFFICE VISIT (OUTPATIENT)
Dept: FAMILY MEDICINE CLINIC | Facility: CLINIC | Age: 24
End: 2019-02-05

## 2019-02-05 VITALS
BODY MASS INDEX: 41.77 KG/M2 | TEMPERATURE: 98.9 F | OXYGEN SATURATION: 96 % | HEIGHT: 69 IN | DIASTOLIC BLOOD PRESSURE: 82 MMHG | WEIGHT: 282 LBS | HEART RATE: 58 BPM | RESPIRATION RATE: 18 BRPM | SYSTOLIC BLOOD PRESSURE: 136 MMHG

## 2019-02-05 DIAGNOSIS — M62.838 NECK MUSCLE SPASM: Primary | ICD-10-CM

## 2019-02-05 PROCEDURE — 99213 OFFICE O/P EST LOW 20 MIN: CPT | Performed by: NURSE PRACTITIONER

## 2019-02-05 RX ORDER — CYCLOBENZAPRINE HCL 10 MG
10 TABLET ORAL 3 TIMES DAILY PRN
Qty: 30 TABLET | Refills: 0 | Status: SHIPPED | OUTPATIENT
Start: 2019-02-05 | End: 2020-06-09

## 2019-02-05 RX ORDER — IBUPROFEN 800 MG/1
800 TABLET ORAL EVERY 6 HOURS PRN
Qty: 90 TABLET | Refills: 0 | Status: SHIPPED | OUTPATIENT
Start: 2019-02-05 | End: 2020-06-09

## 2019-02-05 NOTE — PROGRESS NOTES
Subjective   Davida Quinonez is a 23 y.o. female.     Chief Complaint   Patient presents with   • Shoulder Pain     neck through arm & chest pain/ left sided/ 24+ hours pain 06/10 still 08/10 with movement      HPI  Patient is new to me with 1 day h/o left neck and chest pain.  Pain is intermittent and worsened with movement and reaching improved with lack of movement or lying down.  Has never had this before.  Pain is sharp ache she rates as 6 out of 10 when it hurts.  Has been going to gym recently and has begun lifting weights.  Has not been stretching.  Has lost appr. 10 lbs on weight watchers recently.  Drinks quite a bit of Dr. Pepper.  Works as . Recently got .  Not taking meds for anxiety/depression as she forgot to take them and thinks she is doing fine without them at this point.     Social History     Tobacco Use   • Smoking status: Never Smoker   • Smokeless tobacco: Never Used   Substance Use Topics   • Alcohol use: No   • Drug use: No       The following portions of the patient's history were reviewed and updated as appropriate: allergies, current medications, past family history, past medical history, past social history, past surgical history and problem list.    Review of Systems   Constitutional: Positive for fatigue (always feels fatigued). Negative for chills and fever.   HENT: Negative for sore throat and trouble swallowing.    Respiratory: Negative for cough, chest tightness, shortness of breath and wheezing.    Cardiovascular: Negative for chest pain, palpitations and leg swelling.   Gastrointestinal: Negative for abdominal pain, diarrhea, nausea and vomiting.   Musculoskeletal: Positive for myalgias (left chest wall), neck pain (left neck) and neck stiffness. Negative for back pain and gait problem.   Psychiatric/Behavioral: Positive for dysphoric mood (stable).       Objective   Blood pressure 136/82, pulse 58, temperature 98.9 °F (37.2 °C), resp. rate 18, height  "175.3 cm (69\"), weight 128 kg (282 lb), SpO2 96 %, not currently breastfeeding.    Physical Exam   Constitutional: She is oriented to person, place, and time. She appears well-developed and well-nourished. No distress.   HENT:   Head: Normocephalic and atraumatic.   Mouth/Throat: Oropharynx is clear and moist. No oropharyngeal exudate.   Eyes: Conjunctivae are normal. Right eye exhibits no discharge. Left eye exhibits no discharge.   Neck: No thyromegaly present.   Neck was limited range of motion with rotation to the right due to left sternocleidomastoid tenderness.  Little decreased range of motion with forward flexion and extension.  Full range of motion with left rotation   Cardiovascular: Normal rate, regular rhythm and normal heart sounds.   Pulmonary/Chest: Effort normal and breath sounds normal. She has no wheezes.   Abdominal: Soft. Bowel sounds are normal. There is no tenderness.   Musculoskeletal: She exhibits tenderness (Left chest wall tenderness to palpation). She exhibits no deformity.   Full range of motion to left arm and shoulder  Back full range of motion no tenderness to palpation   Lymphadenopathy:     She has no cervical adenopathy.   Neurological: She is alert and oriented to person, place, and time.   Skin: Skin is warm and dry. No rash noted.   Psychiatric: She has a normal mood and affect. Her speech is normal. Cognition and memory are normal.       Assessment   Problem List Items Addressed This Visit     None           Procedures           Impression and Plan: flexeril prn for muscle spasms-take at night.  Ibuprofen prn with food during day.  As soon as you feel a little better start stretching.  Continue continue to work on diet, adding more water daily.  May go back to lifting weights in 2 days if she's feeling better.  Very important to stretch before and after lifting.      Health Maintenance Due   Topic Date Due   • HPV VACCINES (1 - Female 3-dose series) 02/18/2005         "

## 2019-02-07 ENCOUNTER — OFFICE VISIT (OUTPATIENT)
Dept: FAMILY MEDICINE CLINIC | Facility: CLINIC | Age: 24
End: 2019-02-07

## 2019-02-07 ENCOUNTER — APPOINTMENT (OUTPATIENT)
Dept: LAB | Facility: HOSPITAL | Age: 24
End: 2019-02-07

## 2019-02-07 VITALS
HEIGHT: 69 IN | HEART RATE: 79 BPM | OXYGEN SATURATION: 99 % | DIASTOLIC BLOOD PRESSURE: 82 MMHG | BODY MASS INDEX: 41.81 KG/M2 | SYSTOLIC BLOOD PRESSURE: 142 MMHG | TEMPERATURE: 98.4 F | WEIGHT: 282.3 LBS

## 2019-02-07 DIAGNOSIS — E55.9 VITAMIN D DEFICIENCY: ICD-10-CM

## 2019-02-07 DIAGNOSIS — R07.89 CHEST WALL PAIN: Primary | ICD-10-CM

## 2019-02-07 DIAGNOSIS — R07.89 CHEST WALL PAIN: ICD-10-CM

## 2019-02-07 DIAGNOSIS — M25.512 ACUTE PAIN OF LEFT SHOULDER: ICD-10-CM

## 2019-02-07 LAB
25(OH)D3 SERPL-MCNC: 15.6 NG/ML (ref 30–100)
ALBUMIN SERPL-MCNC: 4.6 G/DL (ref 3.5–5.2)
ALBUMIN/GLOB SERPL: 1.8 G/DL
ALP SERPL-CCNC: 96 U/L (ref 39–117)
ALT SERPL W P-5'-P-CCNC: 49 U/L (ref 1–33)
ANION GAP SERPL CALCULATED.3IONS-SCNC: 11.4 MMOL/L
AST SERPL-CCNC: 30 U/L (ref 1–32)
BASOPHILS # BLD AUTO: 0.04 10*3/MM3 (ref 0–0.2)
BASOPHILS NFR BLD AUTO: 0.7 % (ref 0–1.5)
BILIRUB SERPL-MCNC: 0.5 MG/DL (ref 0.1–1.2)
BUN BLD-MCNC: 9 MG/DL (ref 6–20)
BUN/CREAT SERPL: 12.2 (ref 7–25)
CALCIUM SPEC-SCNC: 9.3 MG/DL (ref 8.6–10.5)
CHLORIDE SERPL-SCNC: 105 MMOL/L (ref 98–107)
CO2 SERPL-SCNC: 25.6 MMOL/L (ref 22–29)
CREAT BLD-MCNC: 0.74 MG/DL (ref 0.57–1)
CRP SERPL-MCNC: 0.31 MG/DL (ref 0–0.5)
DEPRECATED RDW RBC AUTO: 41.1 FL (ref 37–54)
EOSINOPHIL # BLD AUTO: 0.09 10*3/MM3 (ref 0–0.7)
EOSINOPHIL NFR BLD AUTO: 1.5 % (ref 0.3–6.2)
ERYTHROCYTE [DISTWIDTH] IN BLOOD BY AUTOMATED COUNT: 12.7 % (ref 11.7–13)
ERYTHROCYTE [SEDIMENTATION RATE] IN BLOOD: 2 MM/HR (ref 0–20)
GFR SERPL CREATININE-BSD FRML MDRD: 96 ML/MIN/1.73
GLOBULIN UR ELPH-MCNC: 2.6 GM/DL
GLUCOSE BLD-MCNC: 111 MG/DL (ref 65–99)
HCT VFR BLD AUTO: 41.3 % (ref 35.6–45.5)
HGB BLD-MCNC: 13.4 G/DL (ref 11.9–15.5)
IMM GRANULOCYTES # BLD AUTO: 0.01 10*3/MM3 (ref 0–0.03)
IMM GRANULOCYTES NFR BLD AUTO: 0.2 % (ref 0–0.5)
LYMPHOCYTES # BLD AUTO: 2.26 10*3/MM3 (ref 0.9–4.8)
LYMPHOCYTES NFR BLD AUTO: 38 % (ref 19.6–45.3)
MCH RBC QN AUTO: 28.6 PG (ref 26.9–32)
MCHC RBC AUTO-ENTMCNC: 32.4 G/DL (ref 32.4–36.3)
MCV RBC AUTO: 88.1 FL (ref 80.5–98.2)
MONOCYTES # BLD AUTO: 0.48 10*3/MM3 (ref 0.2–1.2)
MONOCYTES NFR BLD AUTO: 8.1 % (ref 5–12)
NEUTROPHILS # BLD AUTO: 3.06 10*3/MM3 (ref 1.9–8.1)
NEUTROPHILS NFR BLD AUTO: 51.5 % (ref 42.7–76)
NRBC BLD AUTO-RTO: 0 /100 WBC (ref 0–0)
PLATELET # BLD AUTO: 298 10*3/MM3 (ref 140–500)
PMV BLD AUTO: 9.4 FL (ref 6–12)
POTASSIUM BLD-SCNC: 4.1 MMOL/L (ref 3.5–5.2)
PROT SERPL-MCNC: 7.2 G/DL (ref 6–8.5)
RBC # BLD AUTO: 4.69 10*6/MM3 (ref 3.9–5.2)
SODIUM BLD-SCNC: 142 MMOL/L (ref 136–145)
WBC NRBC COR # BLD: 5.94 10*3/MM3 (ref 4.5–10.7)

## 2019-02-07 PROCEDURE — 86140 C-REACTIVE PROTEIN: CPT | Performed by: NURSE PRACTITIONER

## 2019-02-07 PROCEDURE — 85651 RBC SED RATE NONAUTOMATED: CPT | Performed by: NURSE PRACTITIONER

## 2019-02-07 PROCEDURE — 80053 COMPREHEN METABOLIC PANEL: CPT | Performed by: NURSE PRACTITIONER

## 2019-02-07 PROCEDURE — 99214 OFFICE O/P EST MOD 30 MIN: CPT | Performed by: NURSE PRACTITIONER

## 2019-02-07 PROCEDURE — 93000 ELECTROCARDIOGRAM COMPLETE: CPT | Performed by: NURSE PRACTITIONER

## 2019-02-07 PROCEDURE — 82306 VITAMIN D 25 HYDROXY: CPT | Performed by: NURSE PRACTITIONER

## 2019-02-07 PROCEDURE — 36415 COLL VENOUS BLD VENIPUNCTURE: CPT

## 2019-02-07 PROCEDURE — 85025 COMPLETE CBC W/AUTO DIFF WBC: CPT | Performed by: NURSE PRACTITIONER

## 2019-02-07 PROCEDURE — 96372 THER/PROPH/DIAG INJ SC/IM: CPT | Performed by: NURSE PRACTITIONER

## 2019-02-07 RX ORDER — METHYLPREDNISOLONE 4 MG/1
TABLET ORAL
Qty: 21 EACH | Refills: 0 | Status: SHIPPED | OUTPATIENT
Start: 2019-02-07 | End: 2020-06-09

## 2019-02-07 RX ORDER — METHYLPREDNISOLONE ACETATE 80 MG/ML
80 INJECTION, SUSPENSION INTRA-ARTICULAR; INTRALESIONAL; INTRAMUSCULAR; SOFT TISSUE ONCE
Status: COMPLETED | OUTPATIENT
Start: 2019-02-07 | End: 2019-02-07

## 2019-02-07 RX ADMIN — METHYLPREDNISOLONE ACETATE 80 MG: 80 INJECTION, SUSPENSION INTRA-ARTICULAR; INTRALESIONAL; INTRAMUSCULAR; SOFT TISSUE at 13:10

## 2019-02-07 NOTE — PROGRESS NOTES
Subjective   Davida Quinonez is a 23 y.o. female presents with 4 day history of chest pain, painful to take deep breaths, radiating into neck. Worse with movement, improved mildly with rest. Denies pain with palpating. Hands are swollen. BP is increased today at 142/82. Denies any headaches or previous BP concern. No fever or chills. No recent URI. Pain with any movement. Started muscle relaxers without improvement. Recently started working out, has not done further work outs since Friday.     Chest Pain    This is a new problem. The current episode started in the past 7 days. The onset quality is sudden. The problem occurs constantly. The problem has been gradually worsening. The pain is present in the substernal region. The pain is at a severity of 9/10. The pain is moderate. The quality of the pain is described as sharp and stabbing. The pain radiates to the left arm. Pertinent negatives include no abdominal pain, back pain, claudication, cough, diaphoresis, dizziness, exertional chest pressure, fever, headaches, hemoptysis, irregular heartbeat, leg pain, lower extremity edema, malaise/fatigue, nausea, near-syncope, numbness, orthopnea, palpitations, PND, shortness of breath, sputum production, syncope, vomiting or weakness. She has tried NSAIDs for the symptoms. The treatment provided mild relief. Risk factors include obesity.   Her past medical history is significant for anxiety/panic attacks.        The following portions of the patient's history were reviewed and updated as appropriate: allergies, current medications, past family history, past medical history, past social history, past surgical history and problem list.    Review of Systems   Constitutional: Negative for diaphoresis, fever and malaise/fatigue.   Respiratory: Negative for cough, hemoptysis, sputum production and shortness of breath.    Cardiovascular: Positive for chest pain. Negative for palpitations, orthopnea, claudication, syncope, PND  and near-syncope.   Gastrointestinal: Negative for abdominal pain, nausea and vomiting.   Musculoskeletal: Negative for back pain.   Neurological: Negative for dizziness, weakness, numbness and headaches.       Objective   Physical Exam   Constitutional: She is oriented to person, place, and time. She appears well-developed and well-nourished.   Appears uncomfortable   Cardiovascular: Normal rate, regular rhythm and normal heart sounds. Exam reveals no gallop and no friction rub.   No murmur heard.  Pulmonary/Chest: Effort normal and breath sounds normal. No stridor. No respiratory distress. She has no wheezes. She has no rales.   No pain with palpation, skin is intact   Abdominal: Soft. Bowel sounds are normal. She exhibits no distension. There is no tenderness.   Musculoskeletal:        Left shoulder: She exhibits decreased range of motion. She exhibits no tenderness, no bony tenderness, no swelling and no effusion.   DROM, pain with arm behind back, above head, unable to raise arm over head, very slow with movement, no pain with extension    Neurological: She is alert and oriented to person, place, and time.   Psychiatric: She has a normal mood and affect.       Assessment/Plan   Davida was seen today for pain.    Diagnoses and all orders for this visit:    Chest wall pain  -     CBC & Differential; Future  -     Comprehensive metabolic panel; Future  -     Sedimentation rate, automated; Future  -     C-reactive protein; Future  -     methylPREDNISolone acetate (DEPO-medrol) injection 80 mg; Inject 1 mL into the appropriate muscle as directed by prescriber 1 (One) Time.  -     ECG 12 Lead    Vitamin D deficiency  -     Vitamin D 25 hydroxy; Future    Acute pain of left shoulder    Other orders  -     MethylPREDNISolone (MEDROL, LAM,) 4 MG tablet; Take as directed on package instructions.        Will give steroid injection to help with pain, start medrol dose pack, instructed to avoid working out until  improvement achieved  Agreeable with recommendations  Instructed for worsening symptoms to proceed to ER  Would consider MRI for continued pain in shoulder

## 2019-02-07 NOTE — PROGRESS NOTES
Procedure     ECG 12 Lead  Date/Time: 2/7/2019 4:12 PM  Performed by: Laurie Wheatley APRN  Authorized by: Laurie Wheatley APRN   Comparison: not compared with previous ECG   Previous ECG: no previous ECG available  Rhythm: sinus rhythm  Rate: normal  Conduction: conduction normal  ST Segments: ST segments normal  T Waves: T waves normal  QRS axis: normal  Clinical impression: normal ECG

## 2019-02-08 ENCOUNTER — TELEPHONE (OUTPATIENT)
Dept: FAMILY MEDICINE CLINIC | Facility: CLINIC | Age: 24
End: 2019-02-08

## 2019-02-08 DIAGNOSIS — M25.512 ACUTE PAIN OF LEFT SHOULDER: Primary | ICD-10-CM

## 2019-02-08 RX ORDER — ERGOCALCIFEROL 1.25 MG/1
50000 CAPSULE ORAL WEEKLY
Qty: 12 CAPSULE | Refills: 1 | Status: SHIPPED | OUTPATIENT
Start: 2019-02-08 | End: 2020-06-09

## 2019-03-11 ENCOUNTER — OFFICE VISIT (OUTPATIENT)
Dept: SLEEP MEDICINE | Facility: HOSPITAL | Age: 24
End: 2019-03-11
Attending: INTERNAL MEDICINE

## 2019-03-11 VITALS
SYSTOLIC BLOOD PRESSURE: 140 MMHG | HEIGHT: 69 IN | BODY MASS INDEX: 40.02 KG/M2 | DIASTOLIC BLOOD PRESSURE: 81 MMHG | WEIGHT: 270.2 LBS | HEART RATE: 64 BPM | OXYGEN SATURATION: 100 %

## 2019-03-11 DIAGNOSIS — G47.33 OSA ON CPAP: Primary | ICD-10-CM

## 2019-03-11 DIAGNOSIS — Z99.89 OSA ON CPAP: Primary | ICD-10-CM

## 2019-03-11 PROCEDURE — G0463 HOSPITAL OUTPT CLINIC VISIT: HCPCS

## 2019-03-11 NOTE — PROGRESS NOTES
"[facility] PULMONARY CARE         Dr Joanna Marsh  [unfilled]  Patient Care Team:  Laurie Wheatley APRN as PCP - General (Family Medicine)    Chief Complaint:Obstructive sleep apnea syndrome with depression  Overall apnea index of 6.9 events per hour  Apnea index in supine positioning 7.2 events per hour  Apnea index on the left position was 7.8 events per hour        Interval History: Patient here first visit after set up.  Currently on auto CPAP 8-20 cm.  Compliance today 90% average daily use 6 hours 51 minutes.  AHI 1.4 events per hour and leak is acceptable.  Patient reports sometimes taking the mask off in her sleep.  She is benefiting from the CPAP mostly.  Still trying to lose weight.  Bedtime 9 PM awake times 6 AM.  Gets about 9 hours of sleep and feels rested in the morning of the CPAP.  No tobacco no alcohol no caffeine abuse.  Currently has a nasal mask that fits well.    REVIEW OF SYSTEMS:   CARDIOVASCULAR: No chest pain, chest pressure or chest discomfort. No palpitations or edema.   RESPIRATORY: No shortness of breath, cough or sputum.   GASTROINTESTINAL: No anorexia, nausea, vomiting or diarrhea. No abdominal pain or blood.   HEMATOLOGIC: No bleeding or bruising.     Ventilator/Non-Invasive Ventilation Settings (From admission, onward)    None            Vital Signs  Heart Rate:  [64] 64  BP: (140)/(81) 140/81  [unfilled]  Flowsheet Rows      First Filed Value   Admission Height  175.3 cm (69\") Documented at 03/11/2019 1448   Admission Weight  123 kg (270 lb 3.2 oz) Documented at 03/11/2019 1448          Physical Exam:   General Appearance:    Alert, cooperative, in no acute distress   Lungs:     Clear to auscultation,respirations regular, even and                  unlabored  ENT Mallampati between 3 and 4    Heart:    Regular rhythm and normal rate, normal S1 and S2, no            murmur, no gallop, no rub, no click   Chest Wall:    No abnormalities observed   Abdomen:     Normal bowel " sounds, no masses, no organomegaly, soft        non-tender, non-distended, no guarding, no rebound                tenderness   Extremities:   Moves all extremities well, no edema, no cyanosis, no             redness     Results Review:                                          I reviewed the patient's new clinical results.  I personally viewed and interpreted the patient's CXR        Medication Review:         No current facility-administered medications for this visit.     ASSESSMENT:   SANDOVAL  ADHD  Depression           PLAN:  Reviewed compliance download with the patient  Since he is taking it off in her sleep I would lower her auto CPAP based on her compliance.  Her 95th percentile CPAP pressure is 10.6.  I would change her auto CPAP to 8-14 cm.  Continue sleep hygiene measures  Patient currently losing significant weight.  If continues to lose significant weight may consider repeating sleep study once patient has lost additional 25 pounds  Sleep hygiene measures  Weight loss encouraged  Follow-up in 1 year    Joanna Marsh MD  03/11/19  3:27 PM

## 2019-07-22 ENCOUNTER — TELEPHONE (OUTPATIENT)
Dept: FAMILY MEDICINE CLINIC | Facility: CLINIC | Age: 24
End: 2019-07-22

## 2019-07-22 NOTE — TELEPHONE ENCOUNTER
Pt called stating she is still having pain in arm has already went to ortho and Pt says she thinks she might need a MRI to see what else is going on since she is still having frequent pain.

## 2019-07-22 NOTE — TELEPHONE ENCOUNTER
Is she having pain in either her neck or shoulder? I know she was treated for a frozen shoulder with ortho. We can also try physical therapy and see if that will help alleviate her pain

## 2019-09-23 ENCOUNTER — OFFICE VISIT (OUTPATIENT)
Dept: OBSTETRICS AND GYNECOLOGY | Facility: CLINIC | Age: 24
End: 2019-09-23

## 2019-09-23 VITALS
HEIGHT: 69 IN | DIASTOLIC BLOOD PRESSURE: 84 MMHG | SYSTOLIC BLOOD PRESSURE: 116 MMHG | WEIGHT: 226.6 LBS | BODY MASS INDEX: 33.56 KG/M2

## 2019-09-23 DIAGNOSIS — Z01.419 PAP SMEAR, LOW-RISK: ICD-10-CM

## 2019-09-23 DIAGNOSIS — Z11.51 SPECIAL SCREENING EXAMINATION FOR HUMAN PAPILLOMAVIRUS (HPV): ICD-10-CM

## 2019-09-23 DIAGNOSIS — Z01.419 WELL WOMAN EXAM: Primary | ICD-10-CM

## 2019-09-23 PROCEDURE — 81025 URINE PREGNANCY TEST: CPT | Performed by: OBSTETRICS & GYNECOLOGY

## 2019-09-23 PROCEDURE — 81002 URINALYSIS NONAUTO W/O SCOPE: CPT | Performed by: OBSTETRICS & GYNECOLOGY

## 2019-09-23 PROCEDURE — 99395 PREV VISIT EST AGE 18-39: CPT | Performed by: OBSTETRICS & GYNECOLOGY

## 2019-09-23 NOTE — PROGRESS NOTES
GYN Annual Exam     CC- Here for annual exam.     Pt new to practice? no  Pt new to me? no     HPI: History of Present Illness      Davida Quinonez is a 24 y.o. female who presents for annual well woman exam. Patient's last menstrual period was 08/29/2019.    MAMMOGRAM UP TO DATE IF AGE APPROPRIATE?  na    COLONOSCOPY UP TO DATE IF AGE APPROPRIATE? na    Fhx breast cancer? no    Fhx ovarian cancer? no    Fhx colon cancer? no    Invitae testing offered? no      PMHX:  Patient Active Problem List   Diagnosis   • Depression   • Sleepiness   • Cough   ; otherwise none    OB History     No data available            Past Medical History:   Diagnosis Date   • Abnormal Pap smear of cervix    • ADHD (attention deficit hyperactivity disorder)    • Anxiety    • Depression    • HPV in female        Past Surgical History:   Procedure Laterality Date   • ADENOIDECTOMY     • CHOLECYSTECTOMY     • EAR TUBES     • TONSILLECTOMY     • WISDOM TOOTH EXTRACTION           Current Outpatient Medications:   •  cyclobenzaprine (FLEXERIL) 10 MG tablet, Take 1 tablet by mouth 3 (Three) Times a Day As Needed for Muscle Spasms., Disp: 30 tablet, Rfl: 0  •  ibuprofen (ADVIL,MOTRIN) 800 MG tablet, Take 1 tablet by mouth Every 6 (Six) Hours As Needed for Mild Pain ., Disp: 90 tablet, Rfl: 0  •  Menthol (ICY HOT ADVANCED RELIEF) 7.5 % patch, Apply 1 patch topically At Night As Needed (pain)., Disp: 30 each, Rfl: 1  •  MethylPREDNISolone (MEDROL, LAM,) 4 MG tablet, Take as directed on package instructions., Disp: 21 each, Rfl: 0  •  sertraline (ZOLOFT) 50 MG tablet, Take 1 tablet by mouth Daily., Disp: 30 tablet, Rfl: 2  •  vitamin D (ERGOCALCIFEROL) 25709 units capsule capsule, Take 1 capsule by mouth 1 (One) Time Per Week., Disp: 12 capsule, Rfl: 1    Allergies   Allergen Reactions   • No Known Drug Allergy        Social History     Tobacco Use   • Smoking status: Never Smoker   • Smokeless tobacco: Never Used   Substance Use Topics   • Alcohol  "use: No   • Drug use: No       Davida Quinonez is a current nonsmoker          Family History   Problem Relation Age of Onset   • Thyroid disease Mother    • Depression Mother    • Hypertension Father    • Hyperlipidemia Father    • Cancer Maternal Grandmother    • Pancreatic cancer Maternal Grandmother    • Pancreatitis Maternal Grandmother    • No Known Problems Brother    • No Known Problems Sister    • Ovarian cancer Other        Review of Systems        EXAM:  /84   Ht 175.3 cm (69.02\")   Wt 103 kg (226 lb 9.6 oz)   LMP 08/29/2019   Breastfeeding? No   BMI 33.45 kg/m²     Physical Exam   Constitutional: She is oriented to person, place, and time. She appears well-developed and well-nourished.   HENT:   Head: Normocephalic and atraumatic.   Neck: Normal range of motion. Neck supple. No thyromegaly present.   Cardiovascular: Normal rate and regular rhythm.   Pulmonary/Chest: Effort normal and breath sounds normal. Right breast exhibits no mass and no nipple discharge. Left breast exhibits no mass and no nipple discharge. Breasts are symmetrical. There is no breast swelling.   Abdominal: Soft. Bowel sounds are normal. She exhibits no distension and no mass. There is no tenderness. There is no rebound and no guarding.   Genitourinary: Vagina normal and uterus normal. No breast tenderness, discharge or bleeding. Pelvic exam was performed with patient prone. There is no lesion on the right labia. There is no lesion on the left labia. Cervix exhibits no motion tenderness and no discharge. Right adnexum displays no mass. Left adnexum displays no mass.   Genitourinary Comments: cx wnl, pap done   Musculoskeletal: Normal range of motion. She exhibits no edema.   Neurological: She is alert and oriented to person, place, and time.   Skin: Skin is warm and dry.   Breasts wnl bilaterally   Psychiatric: She has a normal mood and affect. Her behavior is normal. Judgment and thought content normal.   Nursing note " and vitals reviewed.         As part of wellness and prevention, the following topics were discussed with the patient:  []  Nutrition  []  Physical activity/regular exercise   [x]  Healthy weight  []  Injury prevention  [x]  Substance misuse/abuse  []  Sexual behavior  []  STD prevention  []  Contaception  []  Dental health  [x]  Mental health  []  Immunization  [x]  Encouraged SBE     Counseling and guidance done:  Nutrition, physical activity, healthy weight, injury prevention, misuse of tobacco, alcohol and drugs, sexual behavior and STDs, contraception, dental health, mental health, immunizations breast cancer screening and exams.    Assessment     1) GYN annual well woman exam.   2) PAP done today? yes  3) problems addressed: none       Plan       Follow up prn and one year.    Davida was seen today for gynecologic exam.    Diagnoses and all orders for this visit:    Well woman exam  -     POC Pregnancy, Urine  -     POC Urinalysis Dipstick    Pap smear, low-risk  -     Pap IG, Ct-Ng TV Rfx HPV ASCU    Special screening examination for human papillomavirus (HPV)  -     Pap IG, Ct-Ng TV Rfx HPV ASCU        RTO Return in about 1 year (around 9/23/2020) for Annual physical.    Pt declines contraception.  Counseled.       Alcon Mckenna MD  [unfilled]  2:53 PM

## 2019-09-26 LAB
C TRACH RRNA CVX QL NAA+PROBE: NEGATIVE
CONV .: NORMAL
CYTOLOGIST CVX/VAG CYTO: NORMAL
CYTOLOGY CVX/VAG DOC CYTO: NORMAL
CYTOLOGY CVX/VAG DOC THIN PREP: NORMAL
DX ICD CODE: NORMAL
HIV 1 & 2 AB SER-IMP: NORMAL
Lab: NORMAL
N GONORRHOEA RRNA CVX QL NAA+PROBE: NEGATIVE
OTHER STN SPEC: NORMAL
STAT OF ADQ CVX/VAG CYTO-IMP: NORMAL
T VAGINALIS RRNA SPEC QL NAA+PROBE: NEGATIVE

## 2019-12-02 ENCOUNTER — APPOINTMENT (OUTPATIENT)
Dept: SLEEP MEDICINE | Facility: HOSPITAL | Age: 24
End: 2019-12-02

## 2020-01-13 ENCOUNTER — OFFICE VISIT (OUTPATIENT)
Dept: SLEEP MEDICINE | Facility: HOSPITAL | Age: 25
End: 2020-01-13

## 2020-01-13 VITALS
SYSTOLIC BLOOD PRESSURE: 112 MMHG | WEIGHT: 202 LBS | DIASTOLIC BLOOD PRESSURE: 71 MMHG | BODY MASS INDEX: 29.92 KG/M2 | HEIGHT: 69 IN | HEART RATE: 63 BPM | OXYGEN SATURATION: 96 %

## 2020-05-08 ENCOUNTER — TELEPHONE (OUTPATIENT)
Dept: OBSTETRICS AND GYNECOLOGY | Age: 25
End: 2020-05-08

## 2020-05-08 NOTE — TELEPHONE ENCOUNTER
REED requesting to see you, her lmp was 4/8/2020, I scheduled her in a ob f/u spot 6/10/2020 when patient would be 9 weeks for pregnancy confirm, FYI only. Let me know if I need to R/S.

## 2020-05-08 NOTE — TELEPHONE ENCOUNTER
Fine with me if patient okay with that - if wants to be seen sooner - Stella could do confirmation at 7-8 weeks and I could see her for OB intake

## 2020-05-26 ENCOUNTER — TELEPHONE (OUTPATIENT)
Dept: OBSTETRICS AND GYNECOLOGY | Age: 25
End: 2020-05-26

## 2020-05-27 ENCOUNTER — PROCEDURE VISIT (OUTPATIENT)
Dept: OBSTETRICS AND GYNECOLOGY | Age: 25
End: 2020-05-27

## 2020-05-27 ENCOUNTER — OFFICE VISIT (OUTPATIENT)
Dept: OBSTETRICS AND GYNECOLOGY | Age: 25
End: 2020-05-27

## 2020-05-27 VITALS
HEIGHT: 69 IN | SYSTOLIC BLOOD PRESSURE: 124 MMHG | WEIGHT: 194 LBS | BODY MASS INDEX: 28.73 KG/M2 | DIASTOLIC BLOOD PRESSURE: 64 MMHG

## 2020-05-27 DIAGNOSIS — O26.859 SPOTTING AFFECTING PREGNANCY, ANTEPARTUM: Primary | ICD-10-CM

## 2020-05-27 DIAGNOSIS — Z13.89 SCREENING FOR BLOOD OR PROTEIN IN URINE: Primary | ICD-10-CM

## 2020-05-27 DIAGNOSIS — O26.851 SPOTTING AFFECTING PREGNANCY IN FIRST TRIMESTER: ICD-10-CM

## 2020-05-27 LAB
B-HCG UR QL: POSITIVE
BILIRUB BLD-MCNC: NEGATIVE MG/DL
CLARITY, POC: CLEAR
COLOR UR: YELLOW
GLUCOSE UR STRIP-MCNC: NEGATIVE MG/DL
INTERNAL NEGATIVE CONTROL: NEGATIVE
INTERNAL POSITIVE CONTROL: POSITIVE
KETONES UR QL: NEGATIVE
LEUKOCYTE EST, POC: ABNORMAL
Lab: ABNORMAL
NITRITE UR-MCNC: NEGATIVE MG/ML
PH UR: 7 [PH] (ref 5–8)
PROT UR STRIP-MCNC: NEGATIVE MG/DL
RBC # UR STRIP: ABNORMAL /UL
SP GR UR: 1.02 (ref 1–1.03)
UROBILINOGEN UR QL: NORMAL

## 2020-05-27 PROCEDURE — 81025 URINE PREGNANCY TEST: CPT | Performed by: PHYSICIAN ASSISTANT

## 2020-05-27 PROCEDURE — 76817 TRANSVAGINAL US OBSTETRIC: CPT | Performed by: OBSTETRICS & GYNECOLOGY

## 2020-05-27 PROCEDURE — 0500F INITIAL PRENATAL CARE VISIT: CPT | Performed by: PHYSICIAN ASSISTANT

## 2020-05-27 NOTE — PROGRESS NOTES
"Subjective     Chief Complaint   Patient presents with   • Threatened Miscarriage     c/o early ob, spotting       Davida Quinonez is a 25 y.o.  whose LMP is Patient's last menstrual period was 2020 (exact date). presents with spotting in early pregnancy  She spotted on Monday  Nothing today or yesterday  LMP was 4-8.   No recent IC but does note straining with BM  This is first pregnancy, notes nausea, no vomiting  She is     Pt of dr Barrera    No Additional Complaints Reported    The following portions of the patient's history were reviewed and updated as appropriate:vital signs, allergies, current medications, past family history, past medical history, past social history, past surgical history and problem list      Review of Systems   Genitourinary:bleeding in early pregnancy     Objective      /64   Ht 175.3 cm (69\")   Wt 88 kg (194 lb)   LMP 2020 (Exact Date)   Breastfeeding No   BMI 28.65 kg/m²     Physical Exam    General:   alert, comfortable and no distress   Heart: Not performed today   Lungs: Not performed today.   Breast: Not performed today   Neck: na   Abdomen: {Not performed today   CVA: Not performed today   Pelvis: Not performed today   Extremities: Not performed today   Neurologic: negative   Psychiatric: Normal affect, judgement, and mood       Lab Review   Labs: Urine pregnancy test, Urinalysis - with micro     Imaging   Ultrasound - Pelvic Vaginal  GS measures c/w 6 wks 1 day and FHT measure 115 bpm. Right horn measures 3.5 x 2.77 cm, IUP in right horn    Assessment/Plan     ASSESSMENT  1. Screening for blood or protein in urine    2. Spotting affecting pregnancy in first trimester        PLAN  1.   Orders Placed This Encounter   Procedures   • POC Pregnancy, Urine   • POC Urinalysis Dipstick, Multipro   • ABO / Rh       2. U/s is reassuring. Recommend pelvic rest and plan routine f/u in 10-14 days. Call for increased bleeding or pain.  Plan labs to assess blood " type as well    Follow up: 10 day(s)    MANA Lee  5/27/2020

## 2020-05-28 ENCOUNTER — TELEPHONE (OUTPATIENT)
Dept: OBSTETRICS AND GYNECOLOGY | Age: 25
End: 2020-05-28

## 2020-05-28 LAB
ABO GROUP BLD: NORMAL
RH BLD: NEGATIVE

## 2020-05-28 NOTE — TELEPHONE ENCOUNTER
----- Message from MANA Lujan sent at 5/28/2020  1:16 PM EDT -----  Notify pt that she is rh negative (A - blood type) and should c/i for rhogam injection today.

## 2020-05-29 ENCOUNTER — TELEPHONE (OUTPATIENT)
Dept: OBSTETRICS AND GYNECOLOGY | Age: 25
End: 2020-05-29

## 2020-05-29 ENCOUNTER — CLINICAL SUPPORT (OUTPATIENT)
Dept: OBSTETRICS AND GYNECOLOGY | Age: 25
End: 2020-05-29

## 2020-05-29 DIAGNOSIS — Z29.13 NEED FOR RHOGAM DUE TO RH NEGATIVE MOTHER: Primary | ICD-10-CM

## 2020-05-29 PROCEDURE — 96372 THER/PROPH/DIAG INJ SC/IM: CPT | Performed by: OBSTETRICS & GYNECOLOGY

## 2020-06-01 ENCOUNTER — OFFICE VISIT (OUTPATIENT)
Dept: SLEEP MEDICINE | Facility: HOSPITAL | Age: 25
End: 2020-06-01

## 2020-06-01 VITALS
SYSTOLIC BLOOD PRESSURE: 110 MMHG | HEIGHT: 69 IN | HEART RATE: 85 BPM | DIASTOLIC BLOOD PRESSURE: 60 MMHG | BODY MASS INDEX: 29.33 KG/M2 | WEIGHT: 198 LBS | OXYGEN SATURATION: 96 %

## 2020-06-01 DIAGNOSIS — G47.33 OSA (OBSTRUCTIVE SLEEP APNEA): Primary | ICD-10-CM

## 2020-06-01 PROCEDURE — G0463 HOSPITAL OUTPT CLINIC VISIT: HCPCS

## 2020-06-01 NOTE — PROGRESS NOTES
"[facility] PULMONARY CARE         Dr Joanna Marsh  [unfilled]  Patient Care Team:  Laurie Wheatley APRN as PCP - General (Family Medicine)    Chief Complaint:Obstructive sleep apnea syndrome with depression  Overall apnea index of 6.9 events per hour  Apnea index in supine positioning 7.2 events per hour  Apnea index on the left position was 7.8 events per hour    Interval History: Very pleasant 25-year-old female at last seen about 15 months ago she was diagnosed with mild SANDOVAL and placed on CPAP.  Last visit her compliance AHI and leak were all excellent and she was doing well.  In the interim she has lost about 120 pounds through weight watchers.  She tells me her snoring stopped and she felt great without the CPAP.  She reported no witnessed apnea no excessive daytime sleepiness without the CPAP.  She is now currently 8 weeks gestational with her first baby.  She still is not snoring as such.  She is gained 18 pounds back but no witnessed apnea and feels well in the morning with no excessive daytime sleepiness during the daytime.  No alcohol abuse no restless leg symptoms no parasomnias reported.  She wants to see if she needs to continue using the CPAP as such.    REVIEW OF SYSTEMS:   CARDIOVASCULAR: No chest pain, chest pressure or chest discomfort. No palpitations or edema.   RESPIRATORY: No shortness of breath, cough or sputum.   GASTROINTESTINAL: No anorexia, nausea, vomiting or diarrhea. No abdominal pain or blood.   HEMATOLOGIC: No bleeding or bruising.  Positive for anxiety depression  [place] 3 out of 24 within normal limits    Ventilator/Non-Invasive Ventilation Settings (From admission, onward)    None            Vital Signs  Heart Rate:  [85] 85  BP: (110)/(60) 110/60  [unfilled]  Flowsheet Rows      First Filed Value   Admission Height  175.3 cm (69\") Documented at 06/01/2020 0855   Admission Weight  89.8 kg (198 lb) Documented at 06/01/2020 0855          Physical Exam:  Patient is " examined using the personal protective equipment as per guidelines from infection control for this particular patient as enacted.  Hand hygiene was performed before and after patient interaction.   General Appearance:    Alert, cooperative, in no acute distress.  Following simple commands  Neck midline trachea no thyromegaly   Lungs:     Clear to auscultation,respirations regular, even and                  unlabored  ENT Mallampati between 3 and 4 normal nasal exam    Heart:    Regular rhythm and normal rate, normal S1 and S2, no            murmur, no gallop, no rub, no click   Chest Wall:    No abnormalities observed   Abdomen:     Normal bowel sounds, no masses, no organomegaly, soft        non-tender, non-distended, no guarding, no rebound                tenderness   Extremities:   Moves all extremities well, no edema, no cyanosis, no             redness  CNS no focal neurological deficits no distress  Skin no rashes no nodules  Psych oriented to time place and person normal memory  Musculoskeletal no cyanosis no clubbing normal range of motion     Results Review:                                          I reviewed the patient's new clinical results.  I personally viewed and interpreted the patient's CXR        Medication Review:         No current facility-administered medications for this visit.     ASSESSMENT:   SANDOVAL  Intrauterine pregnancy first trimester  History of ADHD  Depression        PLAN:  At this point we have a young female with history of mild SANDOVAL with significant weight loss from weight watchers.  She is lost a total of 120 pounds but gained 18 pounds back post pregnancy.  She still clinically does not have any symptoms suggest SANDOVAL however due to her pregnant status previous history of SANDOVAL I would go ahead and do a home sleep study to reevaluate.  Educated patient on SANDOVAL and effects on pregnancy.  Sleep hygiene measures  Continue sleep hygiene measures  We will follow-up after home sleep study to  see if she still requires CPAP  Follow-up after HST    Joanna Marsh MD  06/01/20  08:56

## 2020-06-03 PROBLEM — Q51.3 BICORNUATE UTERUS: Status: ACTIVE | Noted: 2020-06-03

## 2020-06-05 ENCOUNTER — HOSPITAL ENCOUNTER (EMERGENCY)
Facility: HOSPITAL | Age: 25
Discharge: HOME | End: 2020-06-06
Attending: OBSTETRICS & GYNECOLOGY | Admitting: EMERGENCY MEDICINE

## 2020-06-05 ENCOUNTER — APPOINTMENT (OUTPATIENT)
Dept: ULTRASOUND IMAGING | Facility: HOSPITAL | Age: 25
End: 2020-06-05

## 2020-06-05 DIAGNOSIS — O20.0 THREATENED ABORTION IN FIRST TRIMESTER: Primary | ICD-10-CM

## 2020-06-05 LAB
HCG INTACT+B SERPL-ACNC: NORMAL MIU/ML
HOLD SPECIMEN: NORMAL
HOLD SPECIMEN: NORMAL
WHOLE BLOOD HOLD SPECIMEN: NORMAL

## 2020-06-05 PROCEDURE — 76815 OB US LIMITED FETUS(S): CPT

## 2020-06-05 PROCEDURE — 84702 CHORIONIC GONADOTROPIN TEST: CPT | Performed by: EMERGENCY MEDICINE

## 2020-06-05 PROCEDURE — 76817 TRANSVAGINAL US OBSTETRIC: CPT

## 2020-06-05 PROCEDURE — 99283 EMERGENCY DEPT VISIT LOW MDM: CPT

## 2020-06-05 PROCEDURE — 93976 VASCULAR STUDY: CPT

## 2020-06-06 VITALS
OXYGEN SATURATION: 99 % | BODY MASS INDEX: 29.24 KG/M2 | DIASTOLIC BLOOD PRESSURE: 74 MMHG | HEIGHT: 69 IN | HEART RATE: 82 BPM | SYSTOLIC BLOOD PRESSURE: 118 MMHG | TEMPERATURE: 97.6 F | RESPIRATION RATE: 16 BRPM

## 2020-06-06 RX ORDER — ONDANSETRON 4 MG/1
TABLET, ORALLY DISINTEGRATING ORAL
Qty: 20 TABLET | Refills: 0 | Status: SHIPPED | OUTPATIENT
Start: 2020-06-06 | End: 2020-06-09

## 2020-06-06 RX ORDER — HYDROCODONE BITARTRATE AND ACETAMINOPHEN 5; 325 MG/1; MG/1
TABLET ORAL
Qty: 15 TABLET | Refills: 0 | Status: SHIPPED | OUTPATIENT
Start: 2020-06-06 | End: 2020-06-09

## 2020-06-06 NOTE — ED PROVIDER NOTES
EMERGENCY DEPARTMENT ENCOUNTER    Room Number:  40/40  Date of encounter:  6/6/2020  PCP: Laurie Wheatley APRN  Historian: Uzair      HPI:  Chief Complaint: Abdominal cramping and vaginal bleeding  A complete HPI/ROS/PMH/PSH/SH/FH are unobtainable due to: N/A   Context: Davida Quinonez is a 25 y.o. female G1, P0 with LMP 4/8/2020 who presents to the ED c/o abrupt onset abdominal cramping with bright red blood per vagina x1 event at 1900 tonight.  No abdominal pain at this time though she does have mild back pain.  No exacerbating relieving factors.  Patient very concerned about miscarriage.  No other associated symptoms such as nausea, vomiting, diarrhea, dysuria or hematuria.  Patient denies any abnormal vaginal discharge prior to onset.    Patient was seen at her OB/GYN office last week for some dark blood spotting and had an ultrasound as well as ABO Rh.  Patient received RhoGam on Friday May 29th as she was found to be Rh-.      MEDICAL HISTORY REVIEW      PAST MEDICAL HISTORY  Active Ambulatory Problems     Diagnosis Date Noted   • Depression 03/22/2016   • Sleepiness 07/12/2017   • Cough 08/04/2017   • Bicornuate uterus 06/03/2020     Resolved Ambulatory Problems     Diagnosis Date Noted   • No Resolved Ambulatory Problems     Past Medical History:   Diagnosis Date   • Abnormal Pap smear of cervix    • ADHD (attention deficit hyperactivity disorder)    • Anxiety    • HPV in female          PAST SURGICAL HISTORY  Past Surgical History:   Procedure Laterality Date   • ADENOIDECTOMY     • CHOLECYSTECTOMY     • EAR TUBES     • TONSILLECTOMY     • WISDOM TOOTH EXTRACTION           FAMILY HISTORY  Family History   Problem Relation Age of Onset   • Thyroid disease Mother    • Depression Mother    • Hypertension Father    • Hyperlipidemia Father    • Cancer Maternal Grandmother    • Pancreatic cancer Maternal Grandmother    • Pancreatitis Maternal Grandmother    • No Known Problems Brother    • No Known  Problems Sister    • Ovarian cancer Other          SOCIAL HISTORY  Social History     Socioeconomic History   • Marital status:      Spouse name: Not on file   • Number of children: Not on file   • Years of education: Not on file   • Highest education level: Not on file   Tobacco Use   • Smoking status: Never Smoker   • Smokeless tobacco: Never Used   Substance and Sexual Activity   • Alcohol use: No   • Drug use: No   • Sexual activity: Yes     Partners: Male         ALLERGIES  No known drug allergy        REVIEW OF SYSTEMS  Review of Systems     All systems reviewed and negative except for those discussed in HPI.       PHYSICAL EXAM    I have reviewed the triage vital signs and nursing notes.    ED Triage Vitals   Temp Heart Rate Resp BP SpO2   06/05/20 2123 06/05/20 2123 06/05/20 2123 06/05/20 2128 06/05/20 2123   97.6 °F (36.4 °C) 105 16 130/82 99 %      Temp src Heart Rate Source Patient Position BP Location FiO2 (%)   06/05/20 2123 06/05/20 2123 -- 06/05/20 2128 --   Tympanic Monitor  Right arm        Physical Exam    Physical Exam   Constitutional: No distress.   HENT:  Head: Normocephalic and atraumatic.   Oropharynx: Mucous membranes are moist.   Eyes: No scleral icterus. No conjunctival pallor.  Neck: Painless range of motion noted. Neck supple.   Cardiovascular: Normal rate, regular rhythm and intact distal pulses.  Pulmonary/Chest: No respiratory distress. There are no wheezes, no rhonchi, and no rales.   Abdominal: Soft. There is mild suprapubic tenderness. There is no rebound and no guarding.   Musculoskeletal: Moves all extremities equally. There is no pedal edema or calf tenderness.   Neurological: Alert.  Baseline strength and sensation noted.   Skin: Skin is pink, warm, and dry. No pallor.   Psychiatric: Mood and affect normal.   Nursing note and vitals reviewed.    LAB RESULTS  Recent Results (from the past 24 hour(s))   Green Top (Gel)    Collection Time: 06/05/20 10:10 PM   Result Value  Ref Range    Extra Tube Hold for add-ons.    Lavender Top    Collection Time: 06/05/20 10:10 PM   Result Value Ref Range    Extra Tube hold for add-on    Gold Top - SST    Collection Time: 06/05/20 10:10 PM   Result Value Ref Range    Extra Tube Hold for add-ons.    hCG, Quantitative, Pregnancy    Collection Time: 06/05/20 10:10 PM   Result Value Ref Range    HCG Quantitative 112,311.00 mIU/mL       Ordered the above labs and independently reviewed the results.        RADIOLOGY  No Radiology Exams Resulted Within Past 24 Hours    I ordered the above noted radiological studies. Reviewed by me and discussed with radiologist.  See dictation for official radiology interpretation.      PROCEDURES    Procedures        MEDICATIONS GIVEN IN ER    Medications - No data to display      PROGRESS, DATA ANALYSIS, CONSULTS, AND MEDICAL DECISION MAKING    My differential diagnosis for abdominal pain includes but is not limited to:  Gastritis, gastroenteritis, peptic ulcer disease, GERD, esophageal perforation, acute appendicitis, mesenteric adenitis, Meckel’s diverticulum, epiploic appendagitis, diverticulitis, colon cancer, ulcerative colitis, Crohn’s disease, intussusception, small bowel obstruction, adhesions, ischemic bowel, perforated viscus, ileus, obstipation, biliary colic, cholecystitis, cholelithiasis, Carlos-Carson Sathish, hepatitis, pancreatitis, common bile duct obstruction, cholangitis, bile leak, splenic trauma, splenic rupture, splenic infarction, splenic abscess, abdominal abscess, ascites, spontaneous bacterial peritonitis, hernia, UTI, cystitis,ureterolithiasis, urinary obstruction, ovarian cyst, torsion, pregnancy, ectopic pregnancy, PID, pelvic abscess, mittelschmerz, endometriosis, AAA, myocardial infarction, pneumonia, cancer, porphyria, DKA, medications, sickle cell, viral syndrome, zoster      All labs have been independently reviewed by me.  All radiology studies have been reviewed by me and discussed with  radiologist dictating the report.   EKG's independently viewed and interpreted by me.  Discussion below represents my analysis of pertinent findings related to patient's condition, differential diagnosis, treatment plan and final disposition.      ED Course as of 6      2258 Patient has not had significant amount of discomfort requiring pain medicine and declines any in the ED.  She would feel much more comfortable with another ultrasound.  This has been ordered.    [RS]   2337 US tech - no FHTs, 7+4, subchorionic hemorrhage, bicornate uterus with pregnancy located in the right horn, gestational sac with septations consistent with signs of early breakdown    [RS]   Sat 2020   0001 US findings by tech confirmed by radiologist, Dr. Mccormick.    [RS]   0023 Case reviewed with Dr. Nunez, OB/GYN on-call.  Reviewed patient history, ED presentation and evaluation.  He recommends pain medicine and discharge unless patient feel strongly about admission for D&C.    [RS]   0024 I discussed at length with the patient her diagnosis and disposition options.  Patient elects to go home and will except small prescription for pain medicine.  We discussed at length red flags which would indicate need for ED return.  Patient expresses understanding and agreement with discharge planning.    [RS]      ED Course User Index  [RS] Barney Hinds MD       AS OF 00:26 VITALS:    BP - 106/67  HR - 96  TEMP - 97.6 °F (36.4 °C) (Tympanic)  O2 SATS - 99%        DIAGNOSIS  Final diagnoses:   Threatened  in first trimester         DISPOSITION  DISCHARGE    Patient discharged in stable condition.    Reviewed implications of results, diagnosis, meds, responsibility to follow up, warning signs and symptoms of possible worsening, potential complications and reasons to return to ER.    Patient/Family voiced understanding of above instructions.    Discussed plan for discharge, as there is no emergent indication for  admission. Patient referred to primary care provider for BP management due to today's BP. Pt/family is agreeable and understands need for follow up and repeat testing.  Pt is aware that discharge does not mean that nothing is wrong but it indicates no emergency is present that requires admission and they must continue care with follow-up as given below or physician of their choice.     FOLLOW-UP  Yadi Barrera MD  [address] [id number] [phone]    Call in 3 days           Medication List      New Prescriptions    HYDROcodone-acetaminophen 5-325 MG per tablet  Commonly known as:  NORCO  Take 1 tab by mouth every 6 hours as needed for pain     ondansetron ODT 4 MG disintegrating tablet  Commonly known as:  Zofran ODT  Take one tablet by mouth every 6 hours as needed for nausea and vomiting               Barney Hinds MD  06/06/20 0026

## 2020-06-06 NOTE — ED TRIAGE NOTES
Pt reports vaginal bleeding that started about 1.5hrs ago. Pt reports she is 8wks pregnant. Pt reports using 1pad an hour. Pt reports intermittent abd cramping.     Pt was wearing a mask at triage.   This RN wore PPE.

## 2020-06-08 ENCOUNTER — TELEPHONE (OUTPATIENT)
Dept: OBSTETRICS AND GYNECOLOGY | Age: 25
End: 2020-06-08

## 2020-06-08 DIAGNOSIS — O02.1 MISSED ABORTION: Primary | ICD-10-CM

## 2020-06-09 ENCOUNTER — PROCEDURE VISIT (OUTPATIENT)
Dept: OBSTETRICS AND GYNECOLOGY | Age: 25
End: 2020-06-09

## 2020-06-09 ENCOUNTER — TRANSCRIBE ORDERS (OUTPATIENT)
Dept: ADMINISTRATIVE | Facility: HOSPITAL | Age: 25
End: 2020-06-09

## 2020-06-09 ENCOUNTER — LAB (OUTPATIENT)
Dept: LAB | Facility: HOSPITAL | Age: 25
End: 2020-06-09

## 2020-06-09 ENCOUNTER — RESULTS ENCOUNTER (OUTPATIENT)
Dept: OBSTETRICS AND GYNECOLOGY | Age: 25
End: 2020-06-09

## 2020-06-09 ENCOUNTER — OFFICE VISIT (OUTPATIENT)
Dept: OBSTETRICS AND GYNECOLOGY | Age: 25
End: 2020-06-09

## 2020-06-09 VITALS
DIASTOLIC BLOOD PRESSURE: 80 MMHG | HEIGHT: 69 IN | WEIGHT: 206 LBS | BODY MASS INDEX: 30.51 KG/M2 | SYSTOLIC BLOOD PRESSURE: 132 MMHG

## 2020-06-09 DIAGNOSIS — Z13.89 SCREENING FOR BLOOD OR PROTEIN IN URINE: ICD-10-CM

## 2020-06-09 DIAGNOSIS — O02.1 MISSED ABORTION: Primary | ICD-10-CM

## 2020-06-09 DIAGNOSIS — Q51.3 BICORNUATE UTERUS: ICD-10-CM

## 2020-06-09 DIAGNOSIS — Z67.91 RH NEGATIVE, ANTEPARTUM: ICD-10-CM

## 2020-06-09 DIAGNOSIS — O26.899 RH NEGATIVE, ANTEPARTUM: ICD-10-CM

## 2020-06-09 DIAGNOSIS — Z01.818 OTHER SPECIFIED PRE-OPERATIVE EXAMINATION: Primary | ICD-10-CM

## 2020-06-09 DIAGNOSIS — O20.9 VAGINAL BLEEDING IN PREGNANCY, FIRST TRIMESTER: Primary | ICD-10-CM

## 2020-06-09 DIAGNOSIS — O02.1 MISSED ABORTION: ICD-10-CM

## 2020-06-09 DIAGNOSIS — Z01.818 OTHER SPECIFIED PRE-OPERATIVE EXAMINATION: ICD-10-CM

## 2020-06-09 PROBLEM — R05.9 COUGH: Status: RESOLVED | Noted: 2017-08-04 | Resolved: 2020-06-09

## 2020-06-09 PROBLEM — R40.0 SLEEPINESS: Status: RESOLVED | Noted: 2017-07-12 | Resolved: 2020-06-09

## 2020-06-09 LAB
BILIRUB BLD-MCNC: NEGATIVE MG/DL
GLUCOSE UR STRIP-MCNC: NEGATIVE MG/DL
KETONES UR QL: NEGATIVE
LEUKOCYTE EST, POC: NEGATIVE
NITRITE UR-MCNC: NEGATIVE MG/ML
PH UR: 7 [PH] (ref 5–8)
PROT UR STRIP-MCNC: NEGATIVE MG/DL
RBC # UR STRIP: NEGATIVE /UL
SP GR UR: 1.02 (ref 1–1.03)
UROBILINOGEN UR QL: NORMAL

## 2020-06-09 PROCEDURE — 99214 OFFICE O/P EST MOD 30 MIN: CPT | Performed by: OBSTETRICS & GYNECOLOGY

## 2020-06-09 PROCEDURE — 76817 TRANSVAGINAL US OBSTETRIC: CPT | Performed by: OBSTETRICS & GYNECOLOGY

## 2020-06-09 PROCEDURE — 81002 URINALYSIS NONAUTO W/O SCOPE: CPT | Performed by: OBSTETRICS & GYNECOLOGY

## 2020-06-09 PROCEDURE — U0004 COV-19 TEST NON-CDC HGH THRU: HCPCS

## 2020-06-09 RX ORDER — SODIUM CHLORIDE 0.9 % (FLUSH) 0.9 %
10 SYRINGE (ML) INJECTION AS NEEDED
Status: CANCELLED | OUTPATIENT
Start: 2020-06-11

## 2020-06-09 RX ORDER — PROMETHAZINE HYDROCHLORIDE 25 MG/1
25 TABLET ORAL EVERY 6 HOURS PRN
COMMUNITY
End: 2020-07-01

## 2020-06-09 RX ORDER — DOXYCYCLINE 100 MG/1
100 CAPSULE ORAL ONCE
Status: CANCELLED | OUTPATIENT
Start: 2020-06-11 | End: 2020-06-09

## 2020-06-09 RX ORDER — SODIUM CHLORIDE 0.9 % (FLUSH) 0.9 %
3 SYRINGE (ML) INJECTION EVERY 12 HOURS SCHEDULED
Status: CANCELLED | OUTPATIENT
Start: 2020-06-11

## 2020-06-09 NOTE — PROGRESS NOTES
"Subjective   Davida Quinonez is a 25 y.o. female viability u/s , some bleeding today , denies any cramping, last pap 19 neg.  Today on US Missed AB at 7-3 weeks  - rhogam on 20 at [place] office.    Discussed options with patient including observation, medical mgmt, or surgical mgmt. Patient discussed with  and desires a suction D&C. Discussed risks to include infection, perforation,and infection.  Patient expressed understanding and desires to proceed.    History of Present Illness    The following portions of the patient's history were reviewed and updated as appropriate: allergies, current medications, past family history, past medical history, past social history, past surgical history and problem list.    Review of Systems   Constitutional: Negative for chills, fatigue and fever.   Gastrointestinal: Negative for abdominal distention and abdominal pain.   Genitourinary: Positive for vaginal bleeding. Negative for dyspareunia, dysuria, menstrual problem, pelvic pain, vaginal discharge and vaginal pain.   All other systems reviewed and are negative.  /80   Ht 175.3 cm (69\")   Wt 93.4 kg (206 lb)   LMP 2020 (Exact Date)   Breastfeeding No   BMI 30.42 kg/m²       Objective   Physical Exam   Constitutional: She is oriented to person, place, and time. She appears well-developed and well-nourished.   Pulmonary/Chest: Effort normal.   Neurological: She is alert and oriented to person, place, and time.   Skin: Skin is warm and dry.   Psychiatric: She has a normal mood and affect. Her behavior is normal.   Nursing note and vitals reviewed.      Assessment/Plan   Davida was seen today for pregnancy ultrasound.    Diagnoses and all orders for this visit:    Missed   -     Case Request  -     COVID-19,UOFL LAB,NP/OP Swab in [employer] 24 HR TAT - Swab, Nasopharynx; Future    Screening for blood or protein in urine  -     POC Urinalysis Dipstick    Bicornuate uterus  -     " Case Request    Rh negative, antepartum    Other orders  -     Follow Anesthesia Guidelines / Protocol; Future  -     Chlorhexidine Skin Prep; Future       Counseling was given to patient for the following topics: diagnostic results, prognosis, impressions and risks and benefits of treatment options . Total time of the encounter was 25 minutes and 20 minutes was spend counseling.

## 2020-06-10 LAB
REF LAB TEST METHOD: NORMAL
SARS-COV-2 RNA RESP QL NAA+PROBE: NOT DETECTED

## 2020-06-10 RX ORDER — IBUPROFEN 600 MG/1
600 TABLET ORAL EVERY 6 HOURS PRN
COMMUNITY
End: 2020-07-01

## 2020-06-11 ENCOUNTER — ANESTHESIA EVENT (OUTPATIENT)
Dept: PERIOP | Facility: HOSPITAL | Age: 25
End: 2020-06-11

## 2020-06-11 ENCOUNTER — ANESTHESIA (OUTPATIENT)
Dept: PERIOP | Facility: HOSPITAL | Age: 25
End: 2020-06-11

## 2020-06-11 ENCOUNTER — HOSPITAL ENCOUNTER (OUTPATIENT)
Facility: HOSPITAL | Age: 25
Setting detail: HOSPITAL OUTPATIENT SURGERY
Discharge: HOME | End: 2020-06-11
Attending: OBSTETRICS & GYNECOLOGY | Admitting: OBSTETRICS & GYNECOLOGY

## 2020-06-11 VITALS
SYSTOLIC BLOOD PRESSURE: 117 MMHG | WEIGHT: 203.3 LBS | RESPIRATION RATE: 16 BRPM | HEART RATE: 49 BPM | HEIGHT: 69 IN | OXYGEN SATURATION: 100 % | BODY MASS INDEX: 30.11 KG/M2 | DIASTOLIC BLOOD PRESSURE: 73 MMHG | TEMPERATURE: 97.7 F

## 2020-06-11 DIAGNOSIS — Z67.91 RH NEGATIVE, ANTEPARTUM: ICD-10-CM

## 2020-06-11 DIAGNOSIS — Q51.3 BICORNUATE UTERUS: ICD-10-CM

## 2020-06-11 DIAGNOSIS — O02.1 MISSED ABORTION: ICD-10-CM

## 2020-06-11 DIAGNOSIS — O26.899 RH NEGATIVE, ANTEPARTUM: ICD-10-CM

## 2020-06-11 LAB
ABO GROUP BLD: NORMAL
BASOPHILS # BLD AUTO: 0.04 10*3/MM3 (ref 0–0.2)
BASOPHILS NFR BLD AUTO: 0.4 % (ref 0–1.5)
BLD GP AB SCN SERPL QL: POSITIVE
DEPRECATED RDW RBC AUTO: 41.8 FL (ref 37–54)
EOSINOPHIL # BLD AUTO: 0.09 10*3/MM3 (ref 0–0.4)
EOSINOPHIL NFR BLD AUTO: 0.9 % (ref 0.3–6.2)
ERYTHROCYTE [DISTWIDTH] IN BLOOD BY AUTOMATED COUNT: 12.8 % (ref 12.3–15.4)
HCT VFR BLD AUTO: 38.3 % (ref 34–46.6)
HGB BLD-MCNC: 12.8 G/DL (ref 12–15.9)
IMM GRANULOCYTES # BLD AUTO: 0.04 10*3/MM3 (ref 0–0.05)
IMM GRANULOCYTES NFR BLD AUTO: 0.4 % (ref 0–0.5)
LYMPHOCYTES # BLD AUTO: 2.22 10*3/MM3 (ref 0.7–3.1)
LYMPHOCYTES NFR BLD AUTO: 23.3 % (ref 19.6–45.3)
MCH RBC QN AUTO: 30.3 PG (ref 26.6–33)
MCHC RBC AUTO-ENTMCNC: 33.4 G/DL (ref 31.5–35.7)
MCV RBC AUTO: 90.5 FL (ref 79–97)
MONOCYTES # BLD AUTO: 0.7 10*3/MM3 (ref 0.1–0.9)
MONOCYTES NFR BLD AUTO: 7.4 % (ref 5–12)
NEUTROPHILS # BLD AUTO: 6.43 10*3/MM3 (ref 1.7–7)
NEUTROPHILS NFR BLD AUTO: 67.6 % (ref 42.7–76)
NRBC BLD AUTO-RTO: 0 /100 WBC (ref 0–0.2)
PLATELET # BLD AUTO: 240 10*3/MM3 (ref 140–450)
PMV BLD AUTO: 9.1 FL (ref 6–12)
RBC # BLD AUTO: 4.23 10*6/MM3 (ref 3.77–5.28)
RESIDUAL RHIG DETECTED: NORMAL
RH BLD: NEGATIVE
T&S EXPIRATION DATE: NORMAL
WBC NRBC COR # BLD: 9.52 10*3/MM3 (ref 3.4–10.8)

## 2020-06-11 PROCEDURE — S0260 H&P FOR SURGERY: HCPCS | Performed by: OBSTETRICS & GYNECOLOGY

## 2020-06-11 PROCEDURE — 86900 BLOOD TYPING SEROLOGIC ABO: CPT | Performed by: OBSTETRICS & GYNECOLOGY

## 2020-06-11 PROCEDURE — 25010000002 PROPOFOL 10 MG/ML EMULSION: Performed by: ANESTHESIOLOGY

## 2020-06-11 PROCEDURE — 86870 RBC ANTIBODY IDENTIFICATION: CPT | Performed by: OBSTETRICS & GYNECOLOGY

## 2020-06-11 PROCEDURE — 86850 RBC ANTIBODY SCREEN: CPT | Performed by: OBSTETRICS & GYNECOLOGY

## 2020-06-11 PROCEDURE — 88305 TISSUE EXAM BY PATHOLOGIST: CPT | Performed by: OBSTETRICS & GYNECOLOGY

## 2020-06-11 PROCEDURE — 25010000002 FENTANYL CITRATE (PF) 100 MCG/2ML SOLUTION: Performed by: ANESTHESIOLOGY

## 2020-06-11 PROCEDURE — 93010 ELECTROCARDIOGRAM REPORT: CPT | Performed by: INTERNAL MEDICINE

## 2020-06-11 PROCEDURE — 85025 COMPLETE CBC W/AUTO DIFF WBC: CPT | Performed by: OBSTETRICS & GYNECOLOGY

## 2020-06-11 PROCEDURE — 59820 CARE OF MISCARRIAGE: CPT | Performed by: OBSTETRICS & GYNECOLOGY

## 2020-06-11 PROCEDURE — 25010000002 MIDAZOLAM PER 1 MG: Performed by: ANESTHESIOLOGY

## 2020-06-11 PROCEDURE — 93005 ELECTROCARDIOGRAM TRACING: CPT | Performed by: OBSTETRICS & GYNECOLOGY

## 2020-06-11 PROCEDURE — 86901 BLOOD TYPING SEROLOGIC RH(D): CPT | Performed by: OBSTETRICS & GYNECOLOGY

## 2020-06-11 RX ORDER — SODIUM CHLORIDE, SODIUM LACTATE, POTASSIUM CHLORIDE, CALCIUM CHLORIDE 600; 310; 30; 20 MG/100ML; MG/100ML; MG/100ML; MG/100ML
9 INJECTION, SOLUTION INTRAVENOUS CONTINUOUS
Status: DISCONTINUED | OUTPATIENT
Start: 2020-06-11 | End: 2020-06-11 | Stop reason: HOSPADM

## 2020-06-11 RX ORDER — HYDROMORPHONE HYDROCHLORIDE 1 MG/ML
0.5 INJECTION, SOLUTION INTRAMUSCULAR; INTRAVENOUS; SUBCUTANEOUS
Status: DISCONTINUED | OUTPATIENT
Start: 2020-06-11 | End: 2020-06-11 | Stop reason: HOSPADM

## 2020-06-11 RX ORDER — HYDRALAZINE HYDROCHLORIDE 20 MG/ML
5 INJECTION INTRAMUSCULAR; INTRAVENOUS
Status: DISCONTINUED | OUTPATIENT
Start: 2020-06-11 | End: 2020-06-11 | Stop reason: HOSPADM

## 2020-06-11 RX ORDER — ONDANSETRON 2 MG/ML
4 INJECTION INTRAMUSCULAR; INTRAVENOUS ONCE AS NEEDED
Status: DISCONTINUED | OUTPATIENT
Start: 2020-06-11 | End: 2020-06-11 | Stop reason: HOSPADM

## 2020-06-11 RX ORDER — MIDAZOLAM HYDROCHLORIDE 1 MG/ML
1 INJECTION INTRAMUSCULAR; INTRAVENOUS
Status: DISCONTINUED | OUTPATIENT
Start: 2020-06-11 | End: 2020-06-11 | Stop reason: HOSPADM

## 2020-06-11 RX ORDER — SODIUM CHLORIDE 0.9 % (FLUSH) 0.9 %
10 SYRINGE (ML) INJECTION AS NEEDED
Status: DISCONTINUED | OUTPATIENT
Start: 2020-06-11 | End: 2020-06-11 | Stop reason: HOSPADM

## 2020-06-11 RX ORDER — FENTANYL CITRATE 50 UG/ML
50 INJECTION, SOLUTION INTRAMUSCULAR; INTRAVENOUS
Status: DISCONTINUED | OUTPATIENT
Start: 2020-06-11 | End: 2020-06-11 | Stop reason: HOSPADM

## 2020-06-11 RX ORDER — SODIUM CHLORIDE 0.9 % (FLUSH) 0.9 %
3 SYRINGE (ML) INJECTION EVERY 12 HOURS SCHEDULED
Status: DISCONTINUED | OUTPATIENT
Start: 2020-06-11 | End: 2020-06-11 | Stop reason: HOSPADM

## 2020-06-11 RX ORDER — LIDOCAINE HYDROCHLORIDE 20 MG/ML
INJECTION, SOLUTION INFILTRATION; PERINEURAL AS NEEDED
Status: DISCONTINUED | OUTPATIENT
Start: 2020-06-11 | End: 2020-06-11 | Stop reason: SURG

## 2020-06-11 RX ORDER — EPHEDRINE SULFATE 50 MG/ML
5 INJECTION, SOLUTION INTRAVENOUS ONCE AS NEEDED
Status: DISCONTINUED | OUTPATIENT
Start: 2020-06-11 | End: 2020-06-11 | Stop reason: HOSPADM

## 2020-06-11 RX ORDER — PROMETHAZINE HYDROCHLORIDE 25 MG/ML
12.5 INJECTION, SOLUTION INTRAMUSCULAR; INTRAVENOUS ONCE AS NEEDED
Status: DISCONTINUED | OUTPATIENT
Start: 2020-06-11 | End: 2020-06-11 | Stop reason: HOSPADM

## 2020-06-11 RX ORDER — DIPHENHYDRAMINE HCL 25 MG
25 CAPSULE ORAL
Status: DISCONTINUED | OUTPATIENT
Start: 2020-06-11 | End: 2020-06-11 | Stop reason: HOSPADM

## 2020-06-11 RX ORDER — LABETALOL HYDROCHLORIDE 5 MG/ML
5 INJECTION, SOLUTION INTRAVENOUS
Status: DISCONTINUED | OUTPATIENT
Start: 2020-06-11 | End: 2020-06-11 | Stop reason: HOSPADM

## 2020-06-11 RX ORDER — PROMETHAZINE HYDROCHLORIDE 25 MG/1
25 TABLET ORAL ONCE AS NEEDED
Status: DISCONTINUED | OUTPATIENT
Start: 2020-06-11 | End: 2020-06-11 | Stop reason: HOSPADM

## 2020-06-11 RX ORDER — METHYLERGONOVINE MALEATE 0.2 MG/ML
INJECTION INTRAVENOUS
Status: DISCONTINUED
Start: 2020-06-11 | End: 2020-06-11 | Stop reason: HOSPADM

## 2020-06-11 RX ORDER — MIDAZOLAM HYDROCHLORIDE 1 MG/ML
2 INJECTION INTRAMUSCULAR; INTRAVENOUS
Status: DISCONTINUED | OUTPATIENT
Start: 2020-06-11 | End: 2020-06-11 | Stop reason: HOSPADM

## 2020-06-11 RX ORDER — LIDOCAINE HYDROCHLORIDE 10 MG/ML
0.5 INJECTION, SOLUTION EPIDURAL; INFILTRATION; INTRACAUDAL; PERINEURAL ONCE AS NEEDED
Status: DISCONTINUED | OUTPATIENT
Start: 2020-06-11 | End: 2020-06-11 | Stop reason: HOSPADM

## 2020-06-11 RX ORDER — DOXYCYCLINE HYCLATE 100 MG/1
200 CAPSULE ORAL ONCE
Qty: 2 CAPSULE | Refills: 0 | Status: SHIPPED | OUTPATIENT
Start: 2020-06-11 | End: 2020-06-11

## 2020-06-11 RX ORDER — SODIUM CHLORIDE 0.9 % (FLUSH) 0.9 %
3-10 SYRINGE (ML) INJECTION AS NEEDED
Status: DISCONTINUED | OUTPATIENT
Start: 2020-06-11 | End: 2020-06-11 | Stop reason: HOSPADM

## 2020-06-11 RX ORDER — FAMOTIDINE 10 MG/ML
20 INJECTION, SOLUTION INTRAVENOUS ONCE
Status: COMPLETED | OUTPATIENT
Start: 2020-06-11 | End: 2020-06-11

## 2020-06-11 RX ORDER — NALOXONE HCL 0.4 MG/ML
0.2 VIAL (ML) INJECTION AS NEEDED
Status: DISCONTINUED | OUTPATIENT
Start: 2020-06-11 | End: 2020-06-11 | Stop reason: HOSPADM

## 2020-06-11 RX ORDER — IBUPROFEN 600 MG/1
600 TABLET ORAL EVERY 6 HOURS PRN
Qty: 15 TABLET | Refills: 0 | Status: SHIPPED | OUTPATIENT
Start: 2020-06-11 | End: 2020-09-08

## 2020-06-11 RX ORDER — OXYCODONE AND ACETAMINOPHEN 7.5; 325 MG/1; MG/1
1 TABLET ORAL ONCE AS NEEDED
Status: DISCONTINUED | OUTPATIENT
Start: 2020-06-11 | End: 2020-06-11 | Stop reason: HOSPADM

## 2020-06-11 RX ORDER — FLUMAZENIL 0.1 MG/ML
0.2 INJECTION INTRAVENOUS AS NEEDED
Status: DISCONTINUED | OUTPATIENT
Start: 2020-06-11 | End: 2020-06-11 | Stop reason: HOSPADM

## 2020-06-11 RX ORDER — DIPHENHYDRAMINE HYDROCHLORIDE 50 MG/ML
12.5 INJECTION INTRAMUSCULAR; INTRAVENOUS
Status: DISCONTINUED | OUTPATIENT
Start: 2020-06-11 | End: 2020-06-11 | Stop reason: HOSPADM

## 2020-06-11 RX ORDER — HYDROCODONE BITARTRATE AND ACETAMINOPHEN 7.5; 325 MG/1; MG/1
1 TABLET ORAL ONCE AS NEEDED
Status: DISCONTINUED | OUTPATIENT
Start: 2020-06-11 | End: 2020-06-11 | Stop reason: HOSPADM

## 2020-06-11 RX ORDER — PROMETHAZINE HYDROCHLORIDE 25 MG/1
25 SUPPOSITORY RECTAL ONCE AS NEEDED
Status: DISCONTINUED | OUTPATIENT
Start: 2020-06-11 | End: 2020-06-11 | Stop reason: HOSPADM

## 2020-06-11 RX ORDER — PROMETHAZINE HYDROCHLORIDE 25 MG/ML
6.25 INJECTION, SOLUTION INTRAMUSCULAR; INTRAVENOUS
Status: DISCONTINUED | OUTPATIENT
Start: 2020-06-11 | End: 2020-06-11 | Stop reason: HOSPADM

## 2020-06-11 RX ORDER — FENTANYL CITRATE 50 UG/ML
INJECTION, SOLUTION INTRAMUSCULAR; INTRAVENOUS AS NEEDED
Status: DISCONTINUED | OUTPATIENT
Start: 2020-06-11 | End: 2020-06-11 | Stop reason: SURG

## 2020-06-11 RX ORDER — DOXYCYCLINE 100 MG/1
100 CAPSULE ORAL ONCE
Status: COMPLETED | OUTPATIENT
Start: 2020-06-11 | End: 2020-06-11

## 2020-06-11 RX ORDER — PROPOFOL 10 MG/ML
VIAL (ML) INTRAVENOUS AS NEEDED
Status: DISCONTINUED | OUTPATIENT
Start: 2020-06-11 | End: 2020-06-11 | Stop reason: SURG

## 2020-06-11 RX ORDER — ACETAMINOPHEN 325 MG/1
650 TABLET ORAL ONCE AS NEEDED
Status: DISCONTINUED | OUTPATIENT
Start: 2020-06-11 | End: 2020-06-11 | Stop reason: HOSPADM

## 2020-06-11 RX ADMIN — PROPOFOL 250 MG: 10 INJECTION, EMULSION INTRAVENOUS at 15:24

## 2020-06-11 RX ADMIN — SODIUM CHLORIDE, POTASSIUM CHLORIDE, SODIUM LACTATE AND CALCIUM CHLORIDE 9 ML/HR: 600; 310; 30; 20 INJECTION, SOLUTION INTRAVENOUS at 11:33

## 2020-06-11 RX ADMIN — FENTANYL CITRATE 50 MCG: 50 INJECTION INTRAMUSCULAR; INTRAVENOUS at 15:47

## 2020-06-11 RX ADMIN — MIDAZOLAM 1 MG: 1 INJECTION INTRAMUSCULAR; INTRAVENOUS at 11:59

## 2020-06-11 RX ADMIN — SODIUM CHLORIDE, POTASSIUM CHLORIDE, SODIUM LACTATE AND CALCIUM CHLORIDE: 600; 310; 30; 20 INJECTION, SOLUTION INTRAVENOUS at 15:39

## 2020-06-11 RX ADMIN — DOXYCYCLINE 100 MG: 100 CAPSULE ORAL at 11:59

## 2020-06-11 RX ADMIN — FENTANYL CITRATE 50 MCG: 50 INJECTION INTRAMUSCULAR; INTRAVENOUS at 15:44

## 2020-06-11 RX ADMIN — MIDAZOLAM 1 MG: 1 INJECTION INTRAMUSCULAR; INTRAVENOUS at 13:24

## 2020-06-11 RX ADMIN — LIDOCAINE HYDROCHLORIDE 100 MG: 20 INJECTION, SOLUTION INFILTRATION; PERINEURAL at 15:24

## 2020-06-11 RX ADMIN — FAMOTIDINE 20 MG: 10 INJECTION, SOLUTION INTRAVENOUS at 11:59

## 2020-06-11 NOTE — ANESTHESIA PREPROCEDURE EVALUATION
Anesthesia Evaluation     Patient summary reviewed and Nursing notes reviewed                Airway   Mallampati: II  Dental      Pulmonary    (+) sleep apnea,   Cardiovascular - negative cardio ROS    ECG reviewed  Rhythm: regular  Rate: normal        Neuro/Psych  (+) psychiatric history ADHD, Depression and Anxiety,     GI/Hepatic/Renal/Endo    (+) obesity,       Musculoskeletal (-) negative ROS    Abdominal    Substance History - negative use     OB/GYN negative ob/gyn ROS         Other                      Anesthesia Plan    ASA 2     general     intravenous induction     Anesthetic plan, all risks, benefits, and alternatives have been provided, discussed and informed consent has been obtained with: patient.

## 2020-06-11 NOTE — H&P
Patient Care Team:  Laurie Wheatley APRN as PCP - General (Family Medicine)    Chief complaint Missed      Subjective     History of Present Illness Davida Quinonez is a 25 y.o. female viability u/s , some bleeding today , denies any cramping, last pap 19 neg.  Today on US Missed AB at 7-3 weeks  - rhogam on 20 at [facility].    Discussed options with patient including observation, medical mgmt, or surgical mgmt. Patient discussed with  and desires a suction D&C. Discussed risks to include infection, perforation,and infection.  Patient expressed understanding and desires to proceed.      Review of Systems   Constitutional: Negative for chills, fatigue and fever.   Gastrointestinal: Negative for abdominal distention and abdominal pain.   Genitourinary: Positive for vaginal bleeding. Negative for dysuria, pelvic pain, vaginal discharge and vaginal pain.   All other systems reviewed and are negative.       Past Medical History:   Diagnosis Date   • Abnormal Pap smear of cervix    • ADHD (attention deficit hyperactivity disorder)    • Anxiety    • Depression    • HPV in female    • Miscarriage    • Sleep apnea     DOESN'T USE MACHINE... LOST 126LBS. JORGE LUIS. FOR NEW SLEEP STUDY     Past Surgical History:   Procedure Laterality Date   • ADENOIDECTOMY     • CHOLECYSTECTOMY     • EAR TUBES     • TONSILLECTOMY     • WISDOM TOOTH EXTRACTION       Family History   Problem Relation Age of Onset   • Thyroid disease Mother    • Depression Mother    • Hypertension Father    • Hyperlipidemia Father    • Cancer Maternal Grandmother    • Pancreatic cancer Maternal Grandmother    • Pancreatitis Maternal Grandmother    • No Known Problems Brother    • No Known Problems Sister    • Ovarian cancer Other    • Malig Hyperthermia Neg Hx      Social History     Tobacco Use   • Smoking status: Never Smoker   • Smokeless tobacco: Never Used   Substance Use Topics   • Alcohol use: No   • Drug use: No      Medications Prior to Admission   Medication Sig Dispense Refill Last Dose   • ibuprofen (ADVIL,MOTRIN) 600 MG tablet Take 600 mg by mouth Every 6 (Six) Hours As Needed for Mild Pain .   2020   • promethazine (PHENERGAN) 25 MG tablet Take 25 mg by mouth Every 6 (Six) Hours As Needed for Nausea or Vomiting.   6/10/2020 at 1400     Allergies:  No known drug allergy  #: 1, Date: 20, Sex: None, Weight: None, GA: 7w3d, Delivery: None, Apgar1: None, Apgar5: None, Living: None, Birth Comments: None      Objective      Vital Signs  Temp:  [98.3 °F (36.8 °C)] 98.3 °F (36.8 °C)  Heart Rate:  [66] 66  Resp:  [16] 16  BP: (124)/(66) 124/66    Physical Exam   Constitutional: She is oriented to person, place, and time. She appears well-developed.   Pulmonary/Chest: Effort normal.   Neurological: She is alert and oriented to person, place, and time.   Skin: Skin is warm and dry.   Psychiatric: She has a normal mood and affect. Her behavior is normal.   Nursing note and vitals reviewed.      Results Review:   I reviewed the patient's new clinical results.      Assessment/Plan       Bicornuate uterus    Missed     Rh negative, antepartum      Assessment & Plan    I discussed the patients findings and my recommendations with patient    Yadi Barrera MD  20  12:01

## 2020-06-11 NOTE — ANESTHESIA PROCEDURE NOTES
Airway  Urgency: elective    Date/Time: 6/11/2020 3:28 PM  End Time:6/11/2020 3:28 PM  Airway not difficult    General Information and Staff    Patient location during procedure: OR  Anesthesiologist: Ramirez Lazar MD    Indications and Patient Condition  Indications for airway management: airway protection    Preoxygenated: yes  Mask difficulty assessment: 0 - not attempted    Final Airway Details  Final airway type: supraglottic airway      Successful airway: classic  Size 4    Number of attempts at approach: 1

## 2020-06-11 NOTE — ANESTHESIA POSTPROCEDURE EVALUATION
"Patient: Davida Quinonez    Procedure Summary     Date:  20 Room / Location:  [facility] OR   [facility]    Anesthesia Start:  1524 Anesthesia Stop:  1602    Procedure:  DILATATION AND CURETTAGE WITH SUCTION (N/A Vagina) Diagnosis:       Bicornuate uterus      Missed       (Bicornuate uterus [Q51.3])      (Missed  [O02.1])    Surgeon:  Yadi Barrera MD Provider:  Ramirez Lazar MD    Anesthesia Type:  general ASA Status:  2          Anesthesia Type: general    Vitals  Vitals Value Taken Time   /64 2020  4:00 PM   Temp 36.5 °C (97.7 °F) 2020  3:56 PM   Pulse 59 2020  4:09 PM   Resp 16 2020  4:00 PM   SpO2 100 % 2020  4:09 PM   Vitals shown include unvalidated device data.        Post Anesthesia Care and Evaluation    Patient location during evaluation: PHASE II  Patient participation: complete - patient participated  Level of consciousness: awake  Pain management: adequate  Airway patency: patent  Anesthetic complications: No anesthetic complications    Cardiovascular status: acceptable  Respiratory status: acceptable  Hydration status: acceptable    Comments: /98   Pulse (!) 47   Temp 36.5 °C (97.7 °F) (Oral)   Resp 16   Ht 175.3 cm (69\")   Wt 92.2 kg (203 lb 4.8 oz)   LMP 2020 (Exact Date)   SpO2 100%   BMI 30.02 kg/m²         "

## 2020-06-11 NOTE — OP NOTE
Operative Note    Date of Procedure: 2020    Patient:  Davida Quinonez   MR#: [id number]    PREOPERATIVE DIAGNOSIS: Bicornuate uterus [Q51.3]  Missed  [O02.1]    Post-Op Diagnosis Codes:     * Bicornuate uterus [Q51.3]     * Missed  [O02.1]      PROCEDURES PERFORMED:    1. Suction Dilation and Curretage     SURGEON: Yadi Barrera MD    ANESTHESIA: General.      ESTIMATED BLOOD LOSS: 50 mL.      SPECIMENS:   Order Name Source Comment Collection Info Order Time   TYPE AND SCREEN   Collected By: Jayshree Emerson RN 2020 11:12 AM   TISSUE PATHOLOGY EXAM Products of Conception  Collected By: Yadi Barrera MD 2020  3:42 PM       COMPLICATIONS: None.      INDICATIONS: See the written history and physical.      DESCRIPTION OF PROCEDURE: The patient is taken to the operating room and placed in supine position.  General anesthesia is administered and the surgical timeout for  procedure is performed.    The patient is prepped and draped in the usual sterile fashion with her legs positioned in candycane stirrups.  Weighted speculum is inserted into the vagina and the cervix is grasped anteriorly with a single-tooth tenaculum.  Sequential dilators were used to dilate the cervix to a maximum of 18 [place].  A 8 mm curved cannula is inserted into the uterine cavity and in a rotating fashion is used to evacuate retained products of conception.  The procedure is repeated approximately 3 times then a single pass is made with the sharp curette followed by final pass with the suction curet.  Findings were consistent with products of conception and scant bleeding is noted after the procedure.    The patient's awakened and taken recovery room in stable condition.  Patient's blood type is A- and she received RhoGAM in the office.       Yadi Barrera MD  2020  16:06

## 2020-06-15 LAB
CYTO UR: NORMAL
LAB AP CASE REPORT: NORMAL
PATH REPORT.FINAL DX SPEC: NORMAL
PATH REPORT.GROSS SPEC: NORMAL

## 2020-06-23 ENCOUNTER — OFFICE VISIT (OUTPATIENT)
Dept: OBSTETRICS AND GYNECOLOGY | Age: 25
End: 2020-06-23

## 2020-06-23 ENCOUNTER — PROCEDURE VISIT (OUTPATIENT)
Dept: OBSTETRICS AND GYNECOLOGY | Age: 25
End: 2020-06-23

## 2020-06-23 VITALS
DIASTOLIC BLOOD PRESSURE: 80 MMHG | HEIGHT: 69 IN | WEIGHT: 203 LBS | BODY MASS INDEX: 30.07 KG/M2 | SYSTOLIC BLOOD PRESSURE: 120 MMHG

## 2020-06-23 DIAGNOSIS — Z67.91 RH NEGATIVE, ANTEPARTUM: ICD-10-CM

## 2020-06-23 DIAGNOSIS — Z98.890 S/P D&C (STATUS POST DILATION AND CURETTAGE): Primary | ICD-10-CM

## 2020-06-23 DIAGNOSIS — Q51.3 BICORNUATE UTERUS: ICD-10-CM

## 2020-06-23 DIAGNOSIS — O03.4 RETAINED PRODUCTS OF CONCEPTION AFTER MISCARRIAGE: ICD-10-CM

## 2020-06-23 DIAGNOSIS — O26.899 RH NEGATIVE, ANTEPARTUM: ICD-10-CM

## 2020-06-23 DIAGNOSIS — O02.1 MISSED ABORTION: ICD-10-CM

## 2020-06-23 DIAGNOSIS — Z32.01 POSITIVE PREGNANCY TEST: ICD-10-CM

## 2020-06-23 PROCEDURE — 99024 POSTOP FOLLOW-UP VISIT: CPT | Performed by: OBSTETRICS & GYNECOLOGY

## 2020-06-23 PROCEDURE — 76817 TRANSVAGINAL US OBSTETRIC: CPT | Performed by: OBSTETRICS & GYNECOLOGY

## 2020-06-23 RX ORDER — PRENATAL VIT NO.126/IRON/FOLIC 28MG-0.8MG
TABLET ORAL DAILY
COMMUNITY
End: 2021-12-15

## 2020-06-23 RX ORDER — MISOPROSTOL 200 UG/1
600 TABLET ORAL ONCE
Qty: 3 TABLET | Refills: 0 | Status: SHIPPED | OUTPATIENT
Start: 2020-06-23 | End: 2020-06-23

## 2020-06-23 NOTE — PROGRESS NOTES
"Subjective   Davida Quinonez is a 25 y.o. female . cc: 1wk 5 days post op D&C on 20 doing well denies any bleeding or pain.      Path reviewed -   Products of Conception:  Benign decidualized endometrium with               A. Chorionic villi identified.               B. Amnion and chorion present.               C. No fetal parts seen.  TVUS today shows small amount of retained products in right horn - discussed options and will give dose of cytotec. Strict infection warnings given.     History of Present Illness    The following portions of the patient's history were reviewed and updated as appropriate: allergies, current medications, past family history, past medical history, past social history, past surgical history and problem list.    Review of Systems   Constitutional: Negative for chills, fatigue and fever.   Gastrointestinal: Negative for abdominal distention and abdominal pain.   Genitourinary: Negative for menstrual problem, pelvic pain, vaginal bleeding, vaginal discharge and vaginal pain.   All other systems reviewed and are negative.    /80   Ht 175.3 cm (69\")   Wt 92.1 kg (203 lb)   LMP 2020 (Exact Date)   Breastfeeding No   BMI 29.98 kg/m²     Objective   Physical Exam   Constitutional: She is oriented to person, place, and time. She appears well-developed and well-nourished.   Pulmonary/Chest: Effort normal.   Neurological: She is alert and oriented to person, place, and time.   Skin: Skin is warm and dry.   Psychiatric: She has a normal mood and affect. Her behavior is normal.   Nursing note and vitals reviewed.      Assessment/Plan   Davida was seen today for post-op.    Diagnoses and all orders for this visit:    S/P D&C (status post dilation and curettage)    Bicornuate uterus    Rh negative, antepartum    Missed     Retained products of conception after miscarriage  -     miSOPROStol (Cytotec) 200 MCG tablet; Take 3 tablets by mouth 1 (One) Time for 1 " dose.     Return 1 week TVUS and f/u   Counseling was given to patient for the following topics: diagnostic results, instructions for management, impressions, risks and benefits of treatment options and importance of treatment compliance . Total time of the encounter was 20 minutes and 15 minutes was spend counseling.

## 2020-06-25 ENCOUNTER — APPOINTMENT (OUTPATIENT)
Dept: SLEEP MEDICINE | Facility: HOSPITAL | Age: 25
End: 2020-06-25

## 2020-06-30 ENCOUNTER — PROCEDURE VISIT (OUTPATIENT)
Dept: OBSTETRICS AND GYNECOLOGY | Age: 25
End: 2020-06-30

## 2020-06-30 ENCOUNTER — OFFICE VISIT (OUTPATIENT)
Dept: OBSTETRICS AND GYNECOLOGY | Age: 25
End: 2020-06-30

## 2020-06-30 VITALS
SYSTOLIC BLOOD PRESSURE: 112 MMHG | WEIGHT: 200 LBS | HEIGHT: 69 IN | DIASTOLIC BLOOD PRESSURE: 72 MMHG | BODY MASS INDEX: 29.62 KG/M2

## 2020-06-30 DIAGNOSIS — O03.4 RETAINED PRODUCTS OF CONCEPTION AFTER MISCARRIAGE: ICD-10-CM

## 2020-06-30 DIAGNOSIS — Q51.3 BICORNUATE UTERUS: Primary | ICD-10-CM

## 2020-06-30 DIAGNOSIS — Q51.3 BICORNUATE UTERUS: ICD-10-CM

## 2020-06-30 DIAGNOSIS — O03.4 RETAINED PRODUCTS OF CONCEPTION AFTER MISCARRIAGE: Primary | ICD-10-CM

## 2020-06-30 DIAGNOSIS — Z13.9 SPECIAL SCREENING: ICD-10-CM

## 2020-06-30 LAB
B-HCG UR QL: POSITIVE
INTERNAL NEGATIVE CONTROL: NEGATIVE
INTERNAL POSITIVE CONTROL: POSITIVE
Lab: ABNORMAL

## 2020-06-30 PROCEDURE — 99024 POSTOP FOLLOW-UP VISIT: CPT | Performed by: OBSTETRICS & GYNECOLOGY

## 2020-06-30 PROCEDURE — 81025 URINE PREGNANCY TEST: CPT | Performed by: OBSTETRICS & GYNECOLOGY

## 2020-06-30 PROCEDURE — 76817 TRANSVAGINAL US OBSTETRIC: CPT | Performed by: OBSTETRICS & GYNECOLOGY

## 2020-06-30 RX ORDER — MISOPROSTOL 200 UG/1
800 TABLET ORAL DAILY
Qty: 8 TABLET | Refills: 0 | Status: SHIPPED | OUTPATIENT
Start: 2020-06-30 | End: 2020-07-01

## 2020-06-30 NOTE — PROGRESS NOTES
"Subjective   Davida Quinonez is a 25 y.o. female who presents for CC : post op from d@c on 06/11/20 , not spotting today , spotted only couple of days after cytotec (600 mcg).  Today on US still with retained POC . Discussed options including observation, trying higher dose of cytotec and/or repeating D&C.  Patient states she is still paying bills from ER and does not want to have surgery. Patient desires to try higher dose of cytotec. Dicussed that we may still end up having to do another D&C. Patient expressed understanding.        History of Present Illness    The following portions of the patient's history were reviewed and updated as appropriate: allergies, current medications, past family history, past medical history, past social history, past surgical history and problem list.    Review of Systems   Constitutional: Negative for chills, fatigue and fever.   Gastrointestinal: Negative for abdominal distention and abdominal pain.   Genitourinary: Negative for dyspareunia, dysuria, menstrual problem, pelvic pain, vaginal bleeding, vaginal discharge and vaginal pain.   All other systems reviewed and are negative.  /72   Ht 175.3 cm (69\")   Wt 90.7 kg (200 lb)   LMP 04/08/2020 (Exact Date)   Breastfeeding No   BMI 29.53 kg/m²       Objective   Physical Exam   Constitutional: She is oriented to person, place, and time. She appears well-developed and well-nourished.   Pulmonary/Chest: Effort normal.   Neurological: She is alert and oriented to person, place, and time.   Skin: Skin is warm and dry.   Psychiatric: She has a normal mood and affect. Her behavior is normal.   Nursing note and vitals reviewed.      Assessment/Plan   Davida was seen today for post-op.    Diagnoses and all orders for this visit:    Retained products of conception after miscarriage  -     miSOPROStol (Cytotec) 200 MCG tablet; Take 4 tablets by mouth Daily for 2 doses. 24 hours apart    Special screening  -     POC Pregnancy, " Urine    Bicornuate uterus       Strict infection/bleeding warnings  Counseling was given to patient for the following topics: diagnostic results, instructions for management, impressions, risks and benefits of treatment options and importance of treatment compliance   Return 2 weeks

## 2020-07-01 ENCOUNTER — OFFICE VISIT (OUTPATIENT)
Dept: FAMILY MEDICINE CLINIC | Facility: CLINIC | Age: 25
End: 2020-07-01

## 2020-07-01 VITALS
SYSTOLIC BLOOD PRESSURE: 112 MMHG | BODY MASS INDEX: 29.67 KG/M2 | HEIGHT: 69 IN | OXYGEN SATURATION: 95 % | HEART RATE: 93 BPM | WEIGHT: 200.3 LBS | TEMPERATURE: 96.3 F | DIASTOLIC BLOOD PRESSURE: 72 MMHG

## 2020-07-01 DIAGNOSIS — Z83.49 FAMILY HISTORY OF THYROID DISEASE: ICD-10-CM

## 2020-07-01 DIAGNOSIS — Z00.00 ANNUAL PHYSICAL EXAM: Primary | ICD-10-CM

## 2020-07-01 DIAGNOSIS — E55.9 VITAMIN D DEFICIENCY: ICD-10-CM

## 2020-07-01 DIAGNOSIS — Z13.29 SCREENING FOR THYROID DISORDER: ICD-10-CM

## 2020-07-01 DIAGNOSIS — Z11.1 SCREENING FOR TUBERCULOSIS: ICD-10-CM

## 2020-07-01 DIAGNOSIS — Z79.899 MEDICATION MANAGEMENT: ICD-10-CM

## 2020-07-01 DIAGNOSIS — Z11.59 ENCOUNTER FOR HEPATITIS C SCREENING TEST FOR LOW RISK PATIENT: ICD-10-CM

## 2020-07-01 PROCEDURE — 99395 PREV VISIT EST AGE 18-39: CPT | Performed by: NURSE PRACTITIONER

## 2020-07-01 NOTE — PROGRESS NOTES
"Subjective   Davida Quinonez is a 25 y.o. female   who presents for   Chief Complaint   Patient presents with   • Annual Exam           /72   Pulse 93   Temp 96.3 °F (35.7 °C) (Temporal)   Ht 175.3 cm (69.02\")   Wt 90.9 kg (200 lb 4.8 oz)   LMP 04/08/2020 (Exact Date)   SpO2 95%   BMI 29.57 kg/m²       History of Present Illness   Annual exam  Doing well overall  Recent miscarriage and D&C,  May need another procedure, started on misoprostol and waiting to see  Weight loss 127 lbs since her last visit, did gain 27 during pregnancy but has lost 8 lbs in past week  Taking a prenatal vitamin, recent CBC related to procedure  Fasting, would like thyroid checked, recommended by GYN, family hx and hair loss, suspected related to weight loss  On a multivitamin with iron    The following portions of the patient's history were reviewed and updated as appropriate: allergies, current medications, past family history, past medical history, past social history, past surgical history and problem list.    Review of Systems  Review of Systems   Constitutional: Negative.    HENT: Negative.    Eyes: Negative.    Respiratory: Negative.    Cardiovascular: Negative.    Gastrointestinal: Negative.    Endocrine: Negative.    Genitourinary: Negative.    Musculoskeletal: Negative.    Skin: Negative.    Allergic/Immunologic: Negative.    Neurological: Negative.    Hematological: Negative.    Psychiatric/Behavioral: Negative.        Objective   Physical Exam  Physical Exam   Constitutional: She is oriented to person, place, and time. She appears well-developed and well-nourished. No distress.   HENT:   Head: Normocephalic and atraumatic.   Right Ear: Tympanic membrane, external ear and ear canal normal.   Left Ear: Tympanic membrane, external ear and ear canal normal.   Nose: Nose normal.   Mouth/Throat: Uvula is midline, oropharynx is clear and moist and mucous membranes are normal. No oropharyngeal exudate.   Neck: Neck " supple.   Cardiovascular: Normal rate, regular rhythm and normal heart sounds. Exam reveals no gallop and no friction rub.   No murmur heard.  Pulmonary/Chest: Effort normal and breath sounds normal. No respiratory distress. She has no wheezes. She has no rales.   Abdominal: Soft. Bowel sounds are normal. She exhibits no distension. There is no tenderness.   Neurological: She is alert and oriented to person, place, and time.   Skin: Skin is warm and dry. She is not diaphoretic.   Psychiatric: She has a normal mood and affect.   Vitals reviewed.        Assessment/Plan   Davida was seen today for annual exam.    Diagnoses and all orders for this visit:    Annual physical exam    Screening for thyroid disorder  -     TSH Rfx On Abnormal To Free T4    Family history of thyroid disease  -     TSH Rfx On Abnormal To Free T4    Vitamin D deficiency  -     Vitamin D 25 hydroxy    Screening for tuberculosis  -     QuantiFERON TB Gold    Encounter for hepatitis C screening test for low risk patient  -     Hepatitis C Antibody    Medication management  -     Comprehensive metabolic panel    labs today, screen for thyroid  Needs tb screen   Weight loss, doing great  Considering counseling for recent miscarriage, resources discussed  Previous shoulder pain finally resolved this March after decreasing activity  Works as , concerned about plans for next year  Information/counseling provided to the patient regarding periodic reggie maintenance recommendations, including but not limited to immunizations, diet/exercise/healthy lifestyle, laboratory, and other screenings. BMI is discussed. Appropriate exercise, diet, and weight plans are discussed.

## 2020-07-02 ENCOUNTER — TELEPHONE (OUTPATIENT)
Dept: OBSTETRICS AND GYNECOLOGY | Age: 25
End: 2020-07-02

## 2020-07-03 LAB
25(OH)D3+25(OH)D2 SERPL-MCNC: 23.5 NG/ML (ref 30–100)
ALBUMIN SERPL-MCNC: 5.1 G/DL (ref 3.9–5)
ALBUMIN/GLOB SERPL: 2.1 {RATIO} (ref 1.2–2.2)
ALP SERPL-CCNC: 82 IU/L (ref 39–117)
ALT SERPL-CCNC: 16 IU/L (ref 0–32)
AST SERPL-CCNC: 20 IU/L (ref 0–40)
BILIRUB SERPL-MCNC: 0.5 MG/DL (ref 0–1.2)
BUN SERPL-MCNC: 10 MG/DL (ref 6–20)
BUN/CREAT SERPL: 12 (ref 9–23)
CALCIUM SERPL-MCNC: 10 MG/DL (ref 8.7–10.2)
CHLORIDE SERPL-SCNC: 102 MMOL/L (ref 96–106)
CO2 SERPL-SCNC: 25 MMOL/L (ref 20–29)
CREAT SERPL-MCNC: 0.81 MG/DL (ref 0.57–1)
GAMMA INTERFERON BACKGROUND BLD IA-ACNC: 0.02 IU/ML
GLOBULIN SER CALC-MCNC: 2.4 G/DL (ref 1.5–4.5)
GLUCOSE SERPL-MCNC: 88 MG/DL (ref 65–99)
HCV AB S/CO SERPL IA: <0.1 S/CO RATIO (ref 0–0.9)
M TB IFN-G BLD-IMP: NEGATIVE
M TB IFN-G CD4+ BCKGRND COR BLD-ACNC: 0.02 IU/ML
M TB IFN-G CD4+CD8+ BCKGRND COR BLD-ACNC: 0.01 IU/ML
MITOGEN IGNF BLD-ACNC: >10 IU/ML
POTASSIUM SERPL-SCNC: 4.3 MMOL/L (ref 3.5–5.2)
PROT SERPL-MCNC: 7.5 G/DL (ref 6–8.5)
QUANTIFERON INCUBATION: NORMAL
SERVICE CMNT-IMP: NORMAL
SODIUM SERPL-SCNC: 139 MMOL/L (ref 134–144)
TSH SERPL DL<=0.005 MIU/L-ACNC: 1.81 UIU/ML (ref 0.45–4.5)

## 2020-07-14 ENCOUNTER — OFFICE VISIT (OUTPATIENT)
Dept: OBSTETRICS AND GYNECOLOGY | Age: 25
End: 2020-07-14

## 2020-07-14 VITALS
DIASTOLIC BLOOD PRESSURE: 80 MMHG | HEIGHT: 69 IN | WEIGHT: 202 LBS | BODY MASS INDEX: 29.92 KG/M2 | SYSTOLIC BLOOD PRESSURE: 120 MMHG

## 2020-07-14 DIAGNOSIS — Q51.3 BICORNUATE UTERUS: Primary | ICD-10-CM

## 2020-07-14 DIAGNOSIS — Z13.9 SPECIAL SCREENING: ICD-10-CM

## 2020-07-14 PROBLEM — O03.4 RETAINED PRODUCTS OF CONCEPTION AFTER MISCARRIAGE: Status: RESOLVED | Noted: 2020-06-23 | Resolved: 2020-07-14

## 2020-07-14 LAB
B-HCG UR QL: NEGATIVE
INTERNAL NEGATIVE CONTROL: NEGATIVE
INTERNAL POSITIVE CONTROL: POSITIVE
Lab: NORMAL

## 2020-07-14 PROCEDURE — 99024 POSTOP FOLLOW-UP VISIT: CPT | Performed by: OBSTETRICS & GYNECOLOGY

## 2020-07-14 PROCEDURE — 81025 URINE PREGNANCY TEST: CPT | Performed by: OBSTETRICS & GYNECOLOGY

## 2020-07-14 NOTE — PROGRESS NOTES
"Subjective   Davida Quinonez is a 25 y.o. female  4 wks  5 days post op today , D&C on 06/11/20 , pt denies any bleeding or pain doing well.  Patient's urine hcg today neg. Will refer to Dr. Lantigua to assess for possible septum.         History of Present Illness    The following portions of the patient's history were reviewed and updated as appropriate: allergies, current medications, past family history, past medical history, past social history, past surgical history and problem list.    Review of Systems   Constitutional: Negative for chills, fatigue and fever.   Gastrointestinal: Negative for abdominal distention and abdominal pain.   Genitourinary: Negative for dysuria, menstrual problem, pelvic pain, vaginal bleeding, vaginal discharge and vaginal pain.   All other systems reviewed and are negative.  /80   Ht 175.3 cm (69.02\")   Wt 91.6 kg (202 lb)   LMP 04/08/2020 (Exact Date)   Breastfeeding No   BMI 29.81 kg/m²       Objective   Physical Exam   Constitutional: She is oriented to person, place, and time. She appears well-developed and well-nourished.   Pulmonary/Chest: Effort normal.   Neurological: She is alert and oriented to person, place, and time.   Skin: Skin is warm and dry.   Psychiatric: She has a normal mood and affect. Her behavior is normal.   Nursing note and vitals reviewed.      Assessment/Plan   Davida was seen today for post-op.    Diagnoses and all orders for this visit:    Bicornuate uterus    Special screening  -     POC Pregnancy, Urine       Counseling was given to patient for the following topics: diagnostic results, instructions for management and impressions . Total time of the encounter was 20 minutes and 15 minutes was spend counseling.           "

## 2020-07-23 ENCOUNTER — TELEPHONE (OUTPATIENT)
Dept: OBSTETRICS AND GYNECOLOGY | Age: 25
End: 2020-07-23

## 2020-07-23 RX ORDER — FLUCONAZOLE 150 MG/1
150 TABLET ORAL ONCE
Qty: 1 TABLET | Refills: 0 | Status: SHIPPED | OUTPATIENT
Start: 2020-07-23 | End: 2020-07-23

## 2020-07-23 NOTE — TELEPHONE ENCOUNTER
----- Message from Davida Quinonez sent at 7/23/2020 10:31 AM EDT -----  Regarding: Visit Follow-Up Question  Contact: [phone]  Hello I know I just was in there on June 14th but on Friday I started having symptoms of a yeast infection. I am uncomfortable and was wondering if you can provide me a prescription? My pharmacy is [facility] in [facility].

## 2020-07-27 ENCOUNTER — TELEPHONE (OUTPATIENT)
Dept: OBSTETRICS AND GYNECOLOGY | Age: 25
End: 2020-07-27

## 2020-07-27 NOTE — TELEPHONE ENCOUNTER
----- Message from Nida Islas MA sent at 7/27/2020  4:27 PM EDT -----  Regarding: FW: Prescription Question  Contact: [phone]      ----- Message -----  From: Davida Quinonez  Sent: 7/26/2020   1:33 PM EDT  To: [facility]  Subject: Prescription Question                            How long does it take the medicine to completely work? I am feeling a little better but it still isn’t fully better.

## 2020-08-17 ENCOUNTER — TELEPHONE (OUTPATIENT)
Dept: OBSTETRICS AND GYNECOLOGY | Age: 25
End: 2020-08-17

## 2020-08-27 ENCOUNTER — TELEPHONE (OUTPATIENT)
Dept: OBSTETRICS AND GYNECOLOGY | Age: 25
End: 2020-08-27

## 2020-08-27 PROBLEM — O26.851 SPOTTING AFFECTING PREGNANCY IN FIRST TRIMESTER: Status: ACTIVE | Noted: 2020-08-27

## 2020-08-27 NOTE — TELEPHONE ENCOUNTER
Pt states since not having bright red bleeding will not get Rhogam now.Advised pt if becomes bright red to come in for Rhogam.dn

## 2020-08-27 NOTE — TELEPHONE ENCOUNTER
(Bruce pt)  Pt's mother called, daughter is 5 1/2 weeks pregnant.  Mother states daughter miscarried three months ago.  Mother states daughter is having light brown spotting and wants to know if daughter needs to come in and get the rhogam shot?      Davida number [phone]

## 2020-08-27 NOTE — TELEPHONE ENCOUNTER
Would be fine if she wants to get - usually more for bright red bleeding - okay to come in and get

## 2020-09-01 ENCOUNTER — PROCEDURE VISIT (OUTPATIENT)
Dept: OBSTETRICS AND GYNECOLOGY | Age: 25
End: 2020-09-01

## 2020-09-01 DIAGNOSIS — Q51.3 BICORNUATE UTERUS: ICD-10-CM

## 2020-09-01 DIAGNOSIS — O26.851 SPOTTING AFFECTING PREGNANCY IN FIRST TRIMESTER: Primary | ICD-10-CM

## 2020-09-01 PROCEDURE — 96372 THER/PROPH/DIAG INJ SC/IM: CPT | Performed by: OBSTETRICS & GYNECOLOGY

## 2020-09-01 PROCEDURE — 76817 TRANSVAGINAL US OBSTETRIC: CPT | Performed by: OBSTETRICS & GYNECOLOGY

## 2020-09-08 ENCOUNTER — PROCEDURE VISIT (OUTPATIENT)
Dept: OBSTETRICS AND GYNECOLOGY | Age: 25
End: 2020-09-08

## 2020-09-08 ENCOUNTER — OFFICE VISIT (OUTPATIENT)
Dept: OBSTETRICS AND GYNECOLOGY | Age: 25
End: 2020-09-08

## 2020-09-08 VITALS
WEIGHT: 219 LBS | DIASTOLIC BLOOD PRESSURE: 75 MMHG | HEIGHT: 69 IN | BODY MASS INDEX: 32.44 KG/M2 | SYSTOLIC BLOOD PRESSURE: 112 MMHG

## 2020-09-08 DIAGNOSIS — O21.9 NAUSEA AND VOMITING DURING PREGNANCY PRIOR TO 22 WEEKS GESTATION: ICD-10-CM

## 2020-09-08 DIAGNOSIS — Q51.3 BICORNUATE UTERUS: ICD-10-CM

## 2020-09-08 DIAGNOSIS — O26.851 SPOTTING AFFECTING PREGNANCY IN FIRST TRIMESTER: ICD-10-CM

## 2020-09-08 DIAGNOSIS — Z32.00 VISIT FOR CONFIRMATION OF PREGNANCY TEST RESULT WITH PHYSICAL EXAM: Primary | ICD-10-CM

## 2020-09-08 DIAGNOSIS — O26.851 SPOTTING AFFECTING PREGNANCY IN FIRST TRIMESTER: Primary | ICD-10-CM

## 2020-09-08 PROCEDURE — 76817 TRANSVAGINAL US OBSTETRIC: CPT | Performed by: OBSTETRICS & GYNECOLOGY

## 2020-09-08 PROCEDURE — 90471 IMMUNIZATION ADMIN: CPT | Performed by: OBSTETRICS & GYNECOLOGY

## 2020-09-08 PROCEDURE — 90686 IIV4 VACC NO PRSV 0.5 ML IM: CPT | Performed by: OBSTETRICS & GYNECOLOGY

## 2020-09-08 PROCEDURE — 99214 OFFICE O/P EST MOD 30 MIN: CPT | Performed by: OBSTETRICS & GYNECOLOGY

## 2020-09-08 RX ORDER — PROMETHAZINE HYDROCHLORIDE 12.5 MG/1
12.5 TABLET ORAL EVERY 6 HOURS PRN
Qty: 30 TABLET | Refills: 1 | Status: ON HOLD | OUTPATIENT
Start: 2020-09-08 | End: 2021-02-08

## 2020-09-08 NOTE — PROGRESS NOTES
"Subjective   Davida Quinonez is a 25 y.o. female presents to f/u on fetal viability - IUP at 6-5 weeks - BRANDO 4/29/21. Still having a little spotting.     History of Present Illness    The following portions of the patient's history were reviewed and updated as appropriate: allergies, current medications, past family history, past medical history, past social history, past surgical history and problem list.    Review of Systems   Constitutional: Negative for chills, fatigue and fever.   Gastrointestinal: Positive for nausea. Negative for abdominal distention, abdominal pain and vomiting.   Genitourinary: Negative for dyspareunia, dysuria, menstrual problem, pelvic pain, vaginal bleeding, vaginal discharge and vaginal pain.   All other systems reviewed and are negative.  /75   Ht 175.3 cm (69.02\")   Wt 99.3 kg (219 lb)   LMP  (LMP Unknown)   Breastfeeding No   BMI 32.32 kg/m²       Objective   Physical Exam   Constitutional: She is oriented to person, place, and time. She appears well-developed and well-nourished.   Pulmonary/Chest: Effort normal.   Neurological: She is alert and oriented to person, place, and time.   Skin: Skin is warm and dry.   Psychiatric: She has a normal mood and affect. Her behavior is normal.   Nursing note and vitals reviewed.        Assessment/Plan   Davida was seen today for pregnancy ultrasound.    Diagnoses and all orders for this visit:    Visit for confirmation of pregnancy test result with physical exam  -     Varicella Zoster Antibody, IgG  -     Urine Culture - Urine, Urine, Clean Catch  -     Rubella Antibody, IgG  -     RPR  -     HIV-1 / O / 2 Ag / Antibody 4th Generation  -     Hepatitis C Antibody  -     Hepatitis B Surface Antigen  -     Hgb. Frac. Profile  -     CBC & Differential  -     Antibody Screen  -     ABO / Rh  -     Chlamydia trachomatis, Neisseria gonorrhoeae, Trichomonas vaginalis, PCR - Urine, Urine, Clean Catch    Nausea and vomiting during " pregnancy prior to 22 weeks gestation  -     promethazine (PHENERGAN) 12.5 MG tablet; Take 1 tablet by mouth Every 6 (Six) Hours As Needed for Nausea or Vomiting.    Spotting affecting pregnancy in first trimester    Bicornuate uterus    Other orders  -     FluLaval Quad >6 Months ([id number])      Counseling was given to patient for the following topics: diagnostic results, instructions for management, impressions and importance of treatment compliance . Total time of the encounter was 25 minutes and 20 minutes was spend counseling.  Return for ob intake

## 2020-09-10 LAB
ABO GROUP BLD: NORMAL
BACTERIA UR CULT: NO GROWTH
BACTERIA UR CULT: NORMAL
BASOPHILS # BLD AUTO: 0 X10E3/UL (ref 0–0.2)
BASOPHILS NFR BLD AUTO: 0 %
BLD GP AB SCN SERPL QL: NORMAL
BLD GP AB SCN SERPL QL: POSITIVE
BLOOD GROUP ANTIBODIES SERPL: ABNORMAL
C TRACH RRNA SPEC QL NAA+PROBE: NEGATIVE
EOSINOPHIL # BLD AUTO: 0.1 X10E3/UL (ref 0–0.4)
EOSINOPHIL NFR BLD AUTO: 1 %
ERYTHROCYTE [DISTWIDTH] IN BLOOD BY AUTOMATED COUNT: 12 % (ref 11.7–15.4)
HBV SURFACE AG SERPL QL IA: NEGATIVE
HCT VFR BLD AUTO: 42.2 % (ref 34–46.6)
HCV AB S/CO SERPL IA: <0.1 S/CO RATIO (ref 0–0.9)
HGB A MFR BLD: 97.6 % (ref 96.4–98.8)
HGB A2 MFR BLD COLUMN CHROM: 2.4 % (ref 1.8–3.2)
HGB BLD-MCNC: 13.8 G/DL (ref 11.1–15.9)
HGB C MFR BLD: 0 %
HGB F MFR BLD: 0 % (ref 0–2)
HGB FRACT BLD-IMP: NORMAL
HGB S BLD QL SOLY: NEGATIVE
HGB S MFR BLD: 0 %
HIV 1+2 AB+HIV1 P24 AG SERPL QL IA: NON REACTIVE
IMM GRANULOCYTES # BLD AUTO: 0 X10E3/UL (ref 0–0.1)
IMM GRANULOCYTES NFR BLD AUTO: 0 %
LYMPHOCYTES # BLD AUTO: 2.4 X10E3/UL (ref 0.7–3.1)
LYMPHOCYTES NFR BLD AUTO: 25 %
MCH RBC QN AUTO: 29.9 PG (ref 26.6–33)
MCHC RBC AUTO-ENTMCNC: 32.7 G/DL (ref 31.5–35.7)
MCV RBC AUTO: 91 FL (ref 79–97)
MONOCYTES # BLD AUTO: 0.7 X10E3/UL (ref 0.1–0.9)
MONOCYTES NFR BLD AUTO: 7 %
N GONORRHOEA RRNA SPEC QL NAA+PROBE: NEGATIVE
NEUTROPHILS # BLD AUTO: 6.4 X10E3/UL (ref 1.4–7)
NEUTROPHILS NFR BLD AUTO: 67 %
PLATELET # BLD AUTO: 258 X10E3/UL (ref 150–450)
RBC # BLD AUTO: 4.62 X10E6/UL (ref 3.77–5.28)
RH BLD: NEGATIVE
RPR SER QL: NON REACTIVE
RUBV IGG SERPL IA-ACNC: 2.78 INDEX
T VAGINALIS DNA SPEC QL NAA+PROBE: NEGATIVE
VZV IGG SER IA-ACNC: >4000 INDEX
WBC # BLD AUTO: 9.6 X10E3/UL (ref 3.4–10.8)
XXX BLOOD GROUP AB TITR SERPL AHG: ABNORMAL {TITER}

## 2020-10-02 ENCOUNTER — TELEPHONE (OUTPATIENT)
Dept: OBSTETRICS AND GYNECOLOGY | Age: 25
End: 2020-10-02

## 2020-10-02 NOTE — TELEPHONE ENCOUNTER
----- Message from Nida Islas MA sent at 10/2/2020  1:04 PM EDT -----  Regarding: FW: Prescription Question  Contact: [phone]    ----- Message -----  From: Davida Quinonez  Sent: 10/1/2020   3:56 PM EDT  To: [facility]  Subject: Prescription Question                            Hello,  I was wondering if I can get a prescription for prenatals? I am currently taking the gummy prenatals but I would like to start taking ones with iron. I was able to take the samples that were provided during my last visit.

## 2020-10-06 ENCOUNTER — INITIAL PRENATAL (OUTPATIENT)
Dept: OBSTETRICS AND GYNECOLOGY | Age: 25
End: 2020-10-06

## 2020-10-06 VITALS — BODY MASS INDEX: 32.62 KG/M2 | SYSTOLIC BLOOD PRESSURE: 120 MMHG | WEIGHT: 221 LBS | DIASTOLIC BLOOD PRESSURE: 75 MMHG

## 2020-10-06 DIAGNOSIS — Z13.89 SCREENING FOR BLOOD OR PROTEIN IN URINE: ICD-10-CM

## 2020-10-06 DIAGNOSIS — Z34.01 ENCOUNTER FOR PRENATAL CARE IN FIRST TRIMESTER OF FIRST PREGNANCY: Primary | ICD-10-CM

## 2020-10-06 DIAGNOSIS — O21.9 NAUSEA AND VOMITING DURING PREGNANCY PRIOR TO 22 WEEKS GESTATION: ICD-10-CM

## 2020-10-06 DIAGNOSIS — O26.851 SPOTTING AFFECTING PREGNANCY IN FIRST TRIMESTER: ICD-10-CM

## 2020-10-06 DIAGNOSIS — F33.2 SEVERE EPISODE OF RECURRENT MAJOR DEPRESSIVE DISORDER, WITHOUT PSYCHOTIC FEATURES (HCC): ICD-10-CM

## 2020-10-06 DIAGNOSIS — Q51.3 BICORNUATE UTERUS: ICD-10-CM

## 2020-10-06 LAB
BILIRUB BLD-MCNC: NEGATIVE MG/DL
GLUCOSE UR STRIP-MCNC: NEGATIVE MG/DL
KETONES UR QL: NEGATIVE
LEUKOCYTE EST, POC: NEGATIVE
NITRITE UR-MCNC: NEGATIVE MG/ML
PH UR: 5 [PH] (ref 5–8)
PROT UR STRIP-MCNC: NEGATIVE MG/DL
RBC # UR STRIP: NEGATIVE /UL
SP GR UR: 1.02 (ref 1–1.03)
UROBILINOGEN UR QL: NORMAL

## 2020-10-06 PROCEDURE — 0501F PRENATAL FLOW SHEET: CPT | Performed by: OBSTETRICS & GYNECOLOGY

## 2020-10-06 NOTE — PROGRESS NOTES
Patient c/o nausea - declines meds   Desires cfDNA  Today  OB intake done   Prenatal labs reviewed  SAB warnings   4 weeks

## 2020-10-16 ENCOUNTER — TELEPHONE (OUTPATIENT)
Dept: OBSTETRICS AND GYNECOLOGY | Age: 25
End: 2020-10-16

## 2020-10-16 NOTE — TELEPHONE ENCOUNTER
Pt called back, stating she has not received a call about the gender.     Please advise     [phone]

## 2020-10-16 NOTE — TELEPHONE ENCOUNTER
Patient has called several days looking for the gender on her [facility] test.  She states she called [facility] and they told her the results have been sent to us; however, I am not seeing the gender on the test.  Please advise.

## 2020-10-16 NOTE — TELEPHONE ENCOUNTER
Spoke with patient panorama panel results not received yet , pt is ok with that . Will follow up on Monday with the patient .

## 2020-10-17 NOTE — TELEPHONE ENCOUNTER
Blanca - please give me name and number of [facility] rep - we are having serious issues with these results -

## 2020-11-03 ENCOUNTER — ROUTINE PRENATAL (OUTPATIENT)
Dept: OBSTETRICS AND GYNECOLOGY | Age: 25
End: 2020-11-03

## 2020-11-03 VITALS — SYSTOLIC BLOOD PRESSURE: 116 MMHG | BODY MASS INDEX: 32.76 KG/M2 | WEIGHT: 222 LBS | DIASTOLIC BLOOD PRESSURE: 76 MMHG

## 2020-11-03 DIAGNOSIS — Z34.02 ENCOUNTER FOR PRENATAL CARE IN SECOND TRIMESTER OF FIRST PREGNANCY: Primary | ICD-10-CM

## 2020-11-03 DIAGNOSIS — O21.9 NAUSEA AND VOMITING DURING PREGNANCY PRIOR TO 22 WEEKS GESTATION: ICD-10-CM

## 2020-11-03 DIAGNOSIS — Z13.89 SCREENING FOR BLOOD OR PROTEIN IN URINE: ICD-10-CM

## 2020-11-03 LAB
BILIRUB BLD-MCNC: NEGATIVE MG/DL
GLUCOSE UR STRIP-MCNC: NEGATIVE MG/DL
KETONES UR QL: NEGATIVE
LEUKOCYTE EST, POC: ABNORMAL
NITRITE UR-MCNC: NEGATIVE MG/ML
PH UR: 7 [PH] (ref 5–8)
PROT UR STRIP-MCNC: NEGATIVE MG/DL
RBC # UR STRIP: NEGATIVE /UL
SP GR UR: 1.02 (ref 1–1.03)
UROBILINOGEN UR QL: NORMAL

## 2020-11-03 PROCEDURE — 0502F SUBSEQUENT PRENATAL CARE: CPT | Performed by: OBSTETRICS & GYNECOLOGY

## 2020-11-20 ENCOUNTER — TELEPHONE (OUTPATIENT)
Dept: OBSTETRICS AND GYNECOLOGY | Age: 25
End: 2020-11-20

## 2020-11-20 NOTE — TELEPHONE ENCOUNTER
Patient called and states she noticed on MyChart that her urine culture showed a trace of leukocytes and blood.  She is wanting to know if she should be concerned about this.

## 2020-12-03 ENCOUNTER — ROUTINE PRENATAL (OUTPATIENT)
Dept: OBSTETRICS AND GYNECOLOGY | Age: 25
End: 2020-12-03

## 2020-12-03 ENCOUNTER — PROCEDURE VISIT (OUTPATIENT)
Dept: OBSTETRICS AND GYNECOLOGY | Age: 25
End: 2020-12-03

## 2020-12-03 VITALS — DIASTOLIC BLOOD PRESSURE: 78 MMHG | BODY MASS INDEX: 37.34 KG/M2 | SYSTOLIC BLOOD PRESSURE: 120 MMHG | WEIGHT: 253 LBS

## 2020-12-03 DIAGNOSIS — Z34.80 SUPERVISION OF OTHER NORMAL PREGNANCY, ANTEPARTUM: Primary | ICD-10-CM

## 2020-12-03 DIAGNOSIS — Z13.89 SCREENING FOR BLOOD OR PROTEIN IN URINE: ICD-10-CM

## 2020-12-03 DIAGNOSIS — Z36.89 SCREENING, ANTENATAL, FOR FETAL ANATOMIC SURVEY: Primary | ICD-10-CM

## 2020-12-03 LAB
BILIRUB BLD-MCNC: NEGATIVE MG/DL
CLARITY, POC: CLEAR
COLOR UR: YELLOW
GLUCOSE UR STRIP-MCNC: NEGATIVE MG/DL
KETONES UR QL: NEGATIVE
LEUKOCYTE EST, POC: NEGATIVE
NITRITE UR-MCNC: NEGATIVE MG/ML
PH UR: 5.5 [PH] (ref 5–8)
PROT UR STRIP-MCNC: NEGATIVE MG/DL
RBC # UR STRIP: NEGATIVE /UL
SP GR UR: 1.02 (ref 1–1.03)
UROBILINOGEN UR QL: NORMAL

## 2020-12-03 PROCEDURE — 76805 OB US >/= 14 WKS SNGL FETUS: CPT | Performed by: OBSTETRICS & GYNECOLOGY

## 2020-12-03 PROCEDURE — 0502F SUBSEQUENT PRENATAL CARE: CPT | Performed by: NURSE PRACTITIONER

## 2020-12-03 NOTE — PROGRESS NOTES
Davida presents for routine OB visit  No complaints, feeling well  Anatomy normal today  Discussed option of screening with AFP today- pt desires this testing  Significant interval weight gain noted- pt counseled. States she lost over 100 lbs prior to pregnancy and it has been very easy to gain back but admits to returning to poor eating habits.     4 week OB visit  Call for changes or concerns

## 2020-12-05 LAB
AFP ADJ MOM SERPL: 0.79
AFP INTERP SERPL-IMP: NORMAL
AFP INTERP SERPL-IMP: NORMAL
AFP SERPL-MCNC: 28.9 NG/ML
AGE AT DELIVERY: 27.1 YR
GA METHOD: NORMAL
GA: 19 WEEKS
IDDM PATIENT QL: NO
LABORATORY COMMENT REPORT: NORMAL
MULTIPLE PREGNANCY: NO
NEURAL TUBE DEFECT RISK FETUS: NORMAL %
RESULT: NORMAL

## 2020-12-08 PROBLEM — IMO0002 EVALUATE ANATOMY NOT SEEN ON PRIOR SONOGRAM: Status: ACTIVE | Noted: 2020-12-08

## 2020-12-14 ENCOUNTER — TELEPHONE (OUTPATIENT)
Dept: OBSTETRICS AND GYNECOLOGY | Age: 25
End: 2020-12-14

## 2020-12-14 NOTE — TELEPHONE ENCOUNTER
----- Message from Davida Quinonez sent at 12/12/2020 11:57 AM EST -----  Regarding: Non-Urgent Medical Question  Contact: [phone]  Good afternoon,    I started to feel slight pain/discomfort in my left side where my hip and pelvic connect. It more mild pain. I was wondering if that’s normal like ligament pain?

## 2020-12-29 ENCOUNTER — ROUTINE PRENATAL (OUTPATIENT)
Dept: OBSTETRICS AND GYNECOLOGY | Age: 25
End: 2020-12-29

## 2020-12-29 VITALS — BODY MASS INDEX: 40 KG/M2 | DIASTOLIC BLOOD PRESSURE: 80 MMHG | SYSTOLIC BLOOD PRESSURE: 130 MMHG | WEIGHT: 271 LBS

## 2020-12-29 DIAGNOSIS — Q51.3 BICORNUATE UTERUS: ICD-10-CM

## 2020-12-29 DIAGNOSIS — O21.9 NAUSEA AND VOMITING DURING PREGNANCY PRIOR TO 22 WEEKS GESTATION: ICD-10-CM

## 2020-12-29 DIAGNOSIS — Z34.02 ENCOUNTER FOR PRENATAL CARE IN SECOND TRIMESTER OF FIRST PREGNANCY: Primary | ICD-10-CM

## 2020-12-29 DIAGNOSIS — F33.2 SEVERE EPISODE OF RECURRENT MAJOR DEPRESSIVE DISORDER, WITHOUT PSYCHOTIC FEATURES (HCC): ICD-10-CM

## 2020-12-29 DIAGNOSIS — O26.02 EXCESSIVE WEIGHT GAIN DURING PREGNANCY IN SECOND TRIMESTER: ICD-10-CM

## 2020-12-29 DIAGNOSIS — O26.851 SPOTTING AFFECTING PREGNANCY IN FIRST TRIMESTER: ICD-10-CM

## 2020-12-29 DIAGNOSIS — Z13.89 SCREENING FOR BLOOD OR PROTEIN IN URINE: ICD-10-CM

## 2020-12-29 PROBLEM — O26.00 EXCESSIVE WEIGHT GAIN DURING PREGNANCY: Status: ACTIVE | Noted: 2020-12-29

## 2020-12-29 PROCEDURE — 0502F SUBSEQUENT PRENATAL CARE: CPT | Performed by: OBSTETRICS & GYNECOLOGY

## 2020-12-29 NOTE — PROGRESS NOTES
Patient denies complaints   Excessive weight gain - discussed nutrition   Incomplete anatomy- recheck next visit   One-hour gtt and tdap next visit   Obesity - check growth next visit   SAB warnings

## 2021-01-07 ENCOUNTER — TELEPHONE (OUTPATIENT)
Dept: OBSTETRICS AND GYNECOLOGY | Age: 26
End: 2021-01-07

## 2021-01-07 NOTE — TELEPHONE ENCOUNTER
Pt states she has a light green/yellow colored discharge. Unsure of smell, denies any abnormal cramping/lower pelvic pain. She is concerned this might be some sort of infection. Wondering if she needs to come in. Pt states it has been on and off but more so today which is when she got concerned.

## 2021-01-08 ENCOUNTER — ROUTINE PRENATAL (OUTPATIENT)
Dept: OBSTETRICS AND GYNECOLOGY | Age: 26
End: 2021-01-08

## 2021-01-08 VITALS — BODY MASS INDEX: 40.23 KG/M2 | SYSTOLIC BLOOD PRESSURE: 104 MMHG | DIASTOLIC BLOOD PRESSURE: 60 MMHG | WEIGHT: 272.4 LBS

## 2021-01-08 DIAGNOSIS — Z3A.24 24 WEEKS GESTATION OF PREGNANCY: Primary | ICD-10-CM

## 2021-01-08 DIAGNOSIS — N89.8 VAGINAL DISCHARGE: ICD-10-CM

## 2021-01-08 PROCEDURE — 0502F SUBSEQUENT PRENATAL CARE: CPT | Performed by: NURSE PRACTITIONER

## 2021-01-08 NOTE — PROGRESS NOTES
Davida presents with complaint yellowish vaginal discharge x several days  No associated odor or itching. No new partners  Denies bleeding or leaking of fluid  Denies ctx, cramping, pain. +FM    SSE- no pooling, neg nitrizine, nuswab collected  Exam unremarkable- await nuswab and treat accordingly  Keep next scheduled follow up visit- call for changes or worsening sxs

## 2021-01-13 ENCOUNTER — TELEPHONE (OUTPATIENT)
Dept: OBSTETRICS AND GYNECOLOGY | Age: 26
End: 2021-01-13

## 2021-01-13 LAB
A VAGINAE DNA VAG QL NAA+PROBE: NORMAL SCORE
BVAB2 DNA VAG QL NAA+PROBE: NORMAL SCORE
C ALBICANS DNA VAG QL NAA+PROBE: NEGATIVE
C GLABRATA DNA VAG QL NAA+PROBE: NEGATIVE
C TRACH DNA VAG QL NAA+PROBE: NEGATIVE
MEGA1 DNA VAG QL NAA+PROBE: NORMAL SCORE
N GONORRHOEA DNA VAG QL NAA+PROBE: NEGATIVE
T VAGINALIS DNA VAG QL NAA+PROBE: NEGATIVE

## 2021-01-13 NOTE — TELEPHONE ENCOUNTER
----- Message from SCOTT Dangelo sent at 1/13/2021  8:45 AM EST -----  Please notify patient her vaginal swab returned totally negative for infection.

## 2021-01-25 ENCOUNTER — TELEPHONE (OUTPATIENT)
Dept: OBSTETRICS AND GYNECOLOGY | Age: 26
End: 2021-01-25

## 2021-01-25 NOTE — TELEPHONE ENCOUNTER
----- Message from Davida Quinonez sent at 1/23/2021  3:15 AM EST -----  Regarding: Non-Urgent Medical Question  Contact: [phone]  I always forget to ask this question so I was put on a cpap in 2017/2018 I used it for a year but I stopped using it when I lost 126 lbs but since I’m back up to 270, should I reschedule my appointment with my sleep doctor to see if I need to be put back on the cpap? I don’t know how all this works being pregnant. Thank you.

## 2021-01-26 ENCOUNTER — ROUTINE PRENATAL (OUTPATIENT)
Dept: OBSTETRICS AND GYNECOLOGY | Age: 26
End: 2021-01-26

## 2021-01-26 VITALS — WEIGHT: 276 LBS | SYSTOLIC BLOOD PRESSURE: 128 MMHG | BODY MASS INDEX: 40.76 KG/M2 | DIASTOLIC BLOOD PRESSURE: 78 MMHG

## 2021-01-26 DIAGNOSIS — Z13.0 SCREENING FOR IRON DEFICIENCY ANEMIA: ICD-10-CM

## 2021-01-26 DIAGNOSIS — Z34.02 ENCOUNTER FOR PRENATAL CARE IN SECOND TRIMESTER OF FIRST PREGNANCY: Primary | ICD-10-CM

## 2021-01-26 DIAGNOSIS — Z13.89 SCREENING FOR BLOOD OR PROTEIN IN URINE: ICD-10-CM

## 2021-01-26 DIAGNOSIS — O26.892 RH NEGATIVE STATUS DURING PREGNANCY IN SECOND TRIMESTER: ICD-10-CM

## 2021-01-26 DIAGNOSIS — O26.02 EXCESSIVE WEIGHT GAIN DURING PREGNANCY IN SECOND TRIMESTER: ICD-10-CM

## 2021-01-26 DIAGNOSIS — Z23 ENCOUNTER FOR ADMINISTRATION OF VACCINE: ICD-10-CM

## 2021-01-26 DIAGNOSIS — Z67.91 RH NEGATIVE STATUS DURING PREGNANCY IN SECOND TRIMESTER: ICD-10-CM

## 2021-01-26 DIAGNOSIS — Z13.1 SCREENING FOR DIABETES MELLITUS: ICD-10-CM

## 2021-01-26 DIAGNOSIS — Q51.3 BICORNUATE UTERUS: ICD-10-CM

## 2021-01-26 PROBLEM — O26.899 RH NEGATIVE STATUS DURING PREGNANCY: Status: ACTIVE | Noted: 2021-01-26

## 2021-01-26 PROBLEM — IMO0002 EVALUATE ANATOMY NOT SEEN ON PRIOR SONOGRAM: Status: RESOLVED | Noted: 2020-12-08 | Resolved: 2021-01-26

## 2021-01-26 LAB
BILIRUB BLD-MCNC: NEGATIVE MG/DL
GLUCOSE UR STRIP-MCNC: NEGATIVE MG/DL
KETONES UR QL: NEGATIVE
LEUKOCYTE EST, POC: ABNORMAL
NITRITE UR-MCNC: NEGATIVE MG/ML
PH UR: 6 [PH] (ref 5–8)
PROT UR STRIP-MCNC: ABNORMAL MG/DL
RBC # UR STRIP: NEGATIVE /UL
SP GR UR: 1.02 (ref 1–1.03)
UROBILINOGEN UR QL: NORMAL

## 2021-01-26 PROCEDURE — 96372 THER/PROPH/DIAG INJ SC/IM: CPT | Performed by: OBSTETRICS & GYNECOLOGY

## 2021-01-26 PROCEDURE — 0502F SUBSEQUENT PRENATAL CARE: CPT | Performed by: OBSTETRICS & GYNECOLOGY

## 2021-01-26 NOTE — PROGRESS NOTES
Patient denies complaints   Will defer Tdap until after covid completed   Incomplete anatomy - Completed today   EFW 58%  PTL warnings   3 weeks   One-hour gtt today

## 2021-01-27 LAB
BLD GP AB SCN SERPL QL: NEGATIVE
GLUCOSE 1H P 50 G GLC PO SERPL-MCNC: 102 MG/DL (ref 65–139)
HCT VFR BLD AUTO: 37.3 % (ref 34–46.6)
HGB BLD-MCNC: 12.5 G/DL (ref 12–15.9)

## 2021-02-01 ENCOUNTER — APPOINTMENT (OUTPATIENT)
Dept: SLEEP MEDICINE | Facility: HOSPITAL | Age: 26
End: 2021-02-01

## 2021-02-03 ENCOUNTER — TELEPHONE (OUTPATIENT)
Dept: OBSTETRICS AND GYNECOLOGY | Age: 26
End: 2021-02-03

## 2021-02-08 ENCOUNTER — HOSPITAL ENCOUNTER (EMERGENCY)
Facility: HOSPITAL | Age: 26
Discharge: HOME | End: 2021-02-08
Attending: OBSTETRICS & GYNECOLOGY | Admitting: OBSTETRICS & GYNECOLOGY

## 2021-02-08 ENCOUNTER — HOSPITAL ENCOUNTER (OUTPATIENT)
Facility: HOSPITAL | Age: 26
End: 2021-02-08
Attending: OBSTETRICS & GYNECOLOGY | Admitting: OBSTETRICS & GYNECOLOGY

## 2021-02-08 VITALS
SYSTOLIC BLOOD PRESSURE: 114 MMHG | BODY MASS INDEX: 41.47 KG/M2 | HEIGHT: 69 IN | TEMPERATURE: 98 F | RESPIRATION RATE: 16 BRPM | DIASTOLIC BLOOD PRESSURE: 81 MMHG | WEIGHT: 280 LBS | HEART RATE: 93 BPM

## 2021-02-08 LAB
A1 MICROGLOB PLACENTAL VAG QL: NEGATIVE
BILIRUB UR QL STRIP: NEGATIVE
CLARITY UR: ABNORMAL
COLOR UR: YELLOW
GLUCOSE UR STRIP-MCNC: NEGATIVE MG/DL
HGB UR QL STRIP.AUTO: NEGATIVE
KETONES UR QL STRIP: NEGATIVE
LEUKOCYTE ESTERASE UR QL STRIP.AUTO: NEGATIVE
NITRITE UR QL STRIP: NEGATIVE
PH UR STRIP.AUTO: 8.5 [PH] (ref 5–8)
PROT UR QL STRIP: ABNORMAL
SP GR UR STRIP: 1.02 (ref 1–1.03)
UROBILINOGEN UR QL STRIP: ABNORMAL

## 2021-02-08 PROCEDURE — 81003 URINALYSIS AUTO W/O SCOPE: CPT | Performed by: OBSTETRICS & GYNECOLOGY

## 2021-02-08 PROCEDURE — 99284 EMERGENCY DEPT VISIT MOD MDM: CPT | Performed by: OBSTETRICS & GYNECOLOGY

## 2021-02-08 PROCEDURE — 84112 EVAL AMNIOTIC FLUID PROTEIN: CPT | Performed by: OBSTETRICS & GYNECOLOGY

## 2021-02-08 PROCEDURE — 87086 URINE CULTURE/COLONY COUNT: CPT | Performed by: OBSTETRICS & GYNECOLOGY

## 2021-02-08 PROCEDURE — 59025 FETAL NON-STRESS TEST: CPT

## 2021-02-08 NOTE — OBED NOTES
[facility] Note OB        Patient Name: Davida Quinonez  YOB: 1995  MRN: [id number]  Admission Date: 2021  9:26 AM  Date of Service: 2021    Chief Complaint: Rupture of Membranes (Pt arrives through [facility] with c/o of LOF since Friday at 1700)        Subjective     Davida Quinonez is a 25 y.o. female  at 28w4d with Estimated Date of Delivery: 21 who presents with the chief complaint listed above.  She sees Dr. Yadi Barrera for her prenatal care. Her pregnancy has been complicated by:  morbid obesity, Rh negative, anemia, depression, bicornuate uterus, excessive weight gain.   Patient states she think she peed on herself this morning.  However, she called office and was instructed to come in for rule out rupture of membranes.  She denies contractions, fever, or chills.  She is feeling the baby move.    She describes fetal movement as normal.  She is concerned for rupture of membranes.  She denies vaginal bleeding. She is not feeling contractions.          Objective   Patient Active Problem List    Diagnosis   • Rh negative status during pregnancy [O26.899, Z67.91]   • Excessive weight gain during pregnancy [O26.00]   • Nausea and vomiting during pregnancy prior to 22 weeks gestation [O21.9]   • Bicornuate uterus [Q51.3]   • Depression [F32.9]        OB History    Para Term  AB Living   2 0 0 0 1 0   SAB TAB Ectopic Molar Multiple Live Births   1 0 0 0 0 0      # Outcome Date GA Lbr Celestine/2nd Weight Sex Delivery Anes PTL Lv   2 Current            1 SAB 20 7w3d             Obstetric Comments   20 - IUP at 6-5 weeks - BRANDO 21 - F    12/3/20 - 19-0 weeks - Normal CL and normal but INCOMPLETE anatomy - [facility]    21 - 26-5 weeks EFW 58%, AC 77% Normal completed anatomy - [facility]           Past Medical History:   Diagnosis Date   • Abnormal Pap smear of cervix    • ADHD (attention deficit hyperactivity disorder)    • Anxiety    • Depression    • HPV in female     • Miscarriage    • Sleep apnea     DOESN'T USE MACHINE... LOST 126LBS. JORGE LUIS. FOR NEW SLEEP STUDY       Past Surgical History:   Procedure Laterality Date   • ADENOIDECTOMY     • CHOLECYSTECTOMY     • D&C WITH SUCTION N/A 6/11/2020    Procedure: DILATATION AND CURETTAGE WITH SUCTION;  Surgeon: Yadi Barrera MD;  Location: [facility] OR;  Service: Obstetrics/Gynecology;  Laterality: N/A;   • EAR TUBES     • TONSILLECTOMY     • WISDOM TOOTH EXTRACTION         No current facility-administered medications on file prior to encounter.      Current Outpatient Medications on File Prior to Encounter   Medication Sig Dispense Refill   • Prenatal Vit-Fe Fumarate-FA (PRENATAL, CLASSIC, VITAMIN) 28-0.8 MG tablet tablet Take  by mouth Daily.     • [DISCONTINUED] promethazine (PHENERGAN) 12.5 MG tablet Take 1 tablet by mouth Every 6 (Six) Hours As Needed for Nausea or Vomiting. 30 tablet 1       No Known Allergies    Family History   Problem Relation Age of Onset   • Thyroid disease Mother    • Depression Mother    • Hypertension Father    • Hyperlipidemia Father    • Cancer Maternal Grandmother    • Pancreatic cancer Maternal Grandmother    • Pancreatitis Maternal Grandmother    • No Known Problems Brother    • No Known Problems Sister    • Ovarian cancer Other    • Malig Hyperthermia Neg Hx    • Breast cancer Neg Hx    • Uterine cancer Neg Hx    • Colon cancer Neg Hx        Social History     Socioeconomic History   • Marital status:      Spouse name: Not on file   • Number of children: Not on file   • Years of education: Not on file   • Highest education level: Not on file   Tobacco Use   • Smoking status: Never Smoker   • Smokeless tobacco: Never Used   Substance and Sexual Activity   • Alcohol use: No   • Drug use: No   • Sexual activity: Yes     Partners: Male     Birth control/protection: None           Review of Systems   Constitutional: Negative for chills, fatigue and fever.   HENT: Negative for  "congestion, rhinorrhea and sore throat.    Eyes: Negative for visual disturbance.   Respiratory: Negative.    Cardiovascular: Negative.    Gastrointestinal: Negative for abdominal pain, constipation, diarrhea, nausea and vomiting.   Genitourinary: Positive for vaginal discharge. Negative for difficulty urinating, dyspareunia, dysuria, flank pain, frequency, genital sores, hematuria, pelvic pain, urgency, vaginal bleeding and vaginal pain.   Neurological: Negative for dizziness, seizures, light-headedness and headaches.   Psychiatric/Behavioral: Negative for sleep disturbance. The patient is not nervous/anxious.           PHYSICAL EXAM:      VITAL SIGNS:  Vitals:    21 0954   Weight: 127 kg (280 lb)   Height: 175.3 cm (69\")        FHT'S:                   Baseline:  135 BPM  Variability:  Moderate = 6 - 25 BPM  Accelerations:  10x10 present  Decelerations:  absent  Contractions:   absent     Interpretation:    Reactive NST, CAT 1 tracing        PHYSICAL EXAM:    General: well developed; well nourished  no acute distress   Heart: Not performed.   Lungs  : breathing is unlabored     Abdomen: soft, non-tender; no masses  no umbilical or inguinal hernias are present  no hepato-splenomegaly       Cervix: was not checked.      Contractions: none        Extremities: no pedal edema noted    Abdominal US: Fetus breech; MVP 5.01 cm       LABS AND TESTING ORDERED:  1. Uterine and fetal monitoring  2. Nitrazine- equivocal  3. Amnisure- negative    LAB RESULTS:    No results found for this or any previous visit (from the past 24 hour(s)).    Lab Results   Component Value Date    ABO A 2020    RH Negative 2020       No results found for: STREPGPB              Assessment/Plan     ASSESSMENT/PLAN:  Davida Quinonez is a 25 y.o. female  at 28w4d who presented with: concern for ROM.  Amnisure negative with plenty of amniotic fluid seen via MVP on bedside US.  Patient reassured.  She was discharged home with " "return precautions reviewed.  Follow up in office scheduled in 1 week.          Final Impression:  • Pregnancy at 28w4d  • Reactive NST.  CAT 1 tracing  • Rupture of membranes not found  • Maternal vital signs were reviewed and were unremarkable              Vitals:    02/08/21 0954   Weight: 127 kg (280 lb)   Height: 175.3 cm (69\")   •     • No results found for: STREPGPB  Lab Results   Component Value Date    ABO A 09/08/2020    RH Negative 09/08/2020   •   • COVID - 19 status unknown      PLAN:       I have spent 30 minutes including face to face time with the patient, greater than 50% in discussion of the diagnosis (counseling) and/or coordination of care.     Jennifer Brandt MD  2/8/2021  09:55 EST  OB Hospitalist  Phone:  [phone]  "

## 2021-02-09 LAB — BACTERIA SPEC AEROBE CULT: NORMAL

## 2021-02-15 ENCOUNTER — ROUTINE PRENATAL (OUTPATIENT)
Dept: OBSTETRICS AND GYNECOLOGY | Age: 26
End: 2021-02-15

## 2021-02-15 VITALS — SYSTOLIC BLOOD PRESSURE: 120 MMHG | WEIGHT: 284 LBS | BODY MASS INDEX: 41.94 KG/M2 | DIASTOLIC BLOOD PRESSURE: 78 MMHG

## 2021-02-15 DIAGNOSIS — Z13.89 SCREENING FOR BLOOD OR PROTEIN IN URINE: ICD-10-CM

## 2021-02-15 DIAGNOSIS — O26.893 RH NEGATIVE STATUS DURING PREGNANCY IN THIRD TRIMESTER: ICD-10-CM

## 2021-02-15 DIAGNOSIS — Z34.03 ENCOUNTER FOR PRENATAL CARE IN THIRD TRIMESTER OF FIRST PREGNANCY: Primary | ICD-10-CM

## 2021-02-15 DIAGNOSIS — Z67.91 RH NEGATIVE STATUS DURING PREGNANCY IN THIRD TRIMESTER: ICD-10-CM

## 2021-02-15 DIAGNOSIS — Q51.3 BICORNUATE UTERUS: ICD-10-CM

## 2021-02-15 DIAGNOSIS — O26.03 EXCESSIVE WEIGHT GAIN DURING PREGNANCY IN THIRD TRIMESTER: ICD-10-CM

## 2021-02-15 DIAGNOSIS — F33.2 SEVERE EPISODE OF RECURRENT MAJOR DEPRESSIVE DISORDER, WITHOUT PSYCHOTIC FEATURES (HCC): ICD-10-CM

## 2021-02-15 LAB
BILIRUB BLD-MCNC: NEGATIVE MG/DL
GLUCOSE UR STRIP-MCNC: NEGATIVE MG/DL
KETONES UR QL: NEGATIVE
LEUKOCYTE EST, POC: ABNORMAL
NITRITE UR-MCNC: NEGATIVE MG/ML
PH UR: 7 [PH] (ref 5–8)
PROT UR STRIP-MCNC: ABNORMAL MG/DL
RBC # UR STRIP: NEGATIVE /UL
SP GR UR: 1.03 (ref 1–1.03)
UROBILINOGEN UR QL: NORMAL

## 2021-02-15 PROCEDURE — 0502F SUBSEQUENT PRENATAL CARE: CPT | Performed by: OBSTETRICS & GYNECOLOGY

## 2021-02-15 NOTE — PROGRESS NOTES
Patient denies ctx, loss of fluid or vag bleeding + FM mostly in the mornings and at night   Will defer Tdap until after Covid vacc completed this Friday   Excessive weight gain - check growth next visit   Strict FKC/PTL warnings   Hip pain - encouraged pillow btw knees

## 2021-02-16 ENCOUNTER — TELEPHONE (OUTPATIENT)
Dept: OBSTETRICS AND GYNECOLOGY | Age: 26
End: 2021-02-16

## 2021-02-16 NOTE — TELEPHONE ENCOUNTER
Please call and check on patient and see how she is feeling? Texted me that she was having cramping overnight.

## 2021-03-01 ENCOUNTER — ROUTINE PRENATAL (OUTPATIENT)
Dept: OBSTETRICS AND GYNECOLOGY | Age: 26
End: 2021-03-01

## 2021-03-01 VITALS — WEIGHT: 287 LBS | SYSTOLIC BLOOD PRESSURE: 124 MMHG | DIASTOLIC BLOOD PRESSURE: 72 MMHG | BODY MASS INDEX: 42.38 KG/M2

## 2021-03-01 DIAGNOSIS — O26.03 EXCESSIVE WEIGHT GAIN DURING PREGNANCY IN THIRD TRIMESTER: ICD-10-CM

## 2021-03-01 DIAGNOSIS — O26.893 RH NEGATIVE STATUS DURING PREGNANCY IN THIRD TRIMESTER: ICD-10-CM

## 2021-03-01 DIAGNOSIS — Q51.3 BICORNUATE UTERUS: ICD-10-CM

## 2021-03-01 DIAGNOSIS — Z34.03 ENCOUNTER FOR PRENATAL CARE IN THIRD TRIMESTER OF FIRST PREGNANCY: Primary | ICD-10-CM

## 2021-03-01 DIAGNOSIS — Z13.89 SCREENING FOR BLOOD OR PROTEIN IN URINE: ICD-10-CM

## 2021-03-01 DIAGNOSIS — Z67.91 RH NEGATIVE STATUS DURING PREGNANCY IN THIRD TRIMESTER: ICD-10-CM

## 2021-03-01 LAB
BILIRUB BLD-MCNC: NEGATIVE MG/DL
GLUCOSE UR STRIP-MCNC: NEGATIVE MG/DL
KETONES UR QL: NEGATIVE
LEUKOCYTE EST, POC: ABNORMAL
NITRITE UR-MCNC: NEGATIVE MG/ML
PH UR: 6.5 [PH] (ref 5–8)
PROT UR STRIP-MCNC: ABNORMAL MG/DL
RBC # UR STRIP: ABNORMAL /UL
SP GR UR: 1.03 (ref 1–1.03)
UROBILINOGEN UR QL: NORMAL

## 2021-03-01 PROCEDURE — 90471 IMMUNIZATION ADMIN: CPT | Performed by: OBSTETRICS & GYNECOLOGY

## 2021-03-01 PROCEDURE — 0502F SUBSEQUENT PRENATAL CARE: CPT | Performed by: OBSTETRICS & GYNECOLOGY

## 2021-03-01 PROCEDURE — 90715 TDAP VACCINE 7 YRS/> IM: CPT | Performed by: OBSTETRICS & GYNECOLOGY

## 2021-03-15 ENCOUNTER — HOSPITAL ENCOUNTER (OUTPATIENT)
Facility: HOSPITAL | Age: 26
Discharge: HOME | End: 2021-03-15
Attending: OBSTETRICS & GYNECOLOGY | Admitting: OBSTETRICS & GYNECOLOGY

## 2021-03-15 ENCOUNTER — ROUTINE PRENATAL (OUTPATIENT)
Dept: OBSTETRICS AND GYNECOLOGY | Age: 26
End: 2021-03-15

## 2021-03-15 VITALS — SYSTOLIC BLOOD PRESSURE: 120 MMHG | WEIGHT: 293 LBS | BODY MASS INDEX: 43.71 KG/M2 | DIASTOLIC BLOOD PRESSURE: 70 MMHG

## 2021-03-15 VITALS
WEIGHT: 293 LBS | BODY MASS INDEX: 43.4 KG/M2 | HEIGHT: 69 IN | TEMPERATURE: 98.2 F | HEART RATE: 128 BPM | SYSTOLIC BLOOD PRESSURE: 133 MMHG | DIASTOLIC BLOOD PRESSURE: 84 MMHG | RESPIRATION RATE: 16 BRPM

## 2021-03-15 DIAGNOSIS — O26.893 RH NEGATIVE STATUS DURING PREGNANCY IN THIRD TRIMESTER: ICD-10-CM

## 2021-03-15 DIAGNOSIS — Z34.03 ENCOUNTER FOR PRENATAL CARE IN THIRD TRIMESTER OF FIRST PREGNANCY: Primary | ICD-10-CM

## 2021-03-15 DIAGNOSIS — Z13.89 SCREENING FOR BLOOD OR PROTEIN IN URINE: ICD-10-CM

## 2021-03-15 DIAGNOSIS — Z67.91 RH NEGATIVE STATUS DURING PREGNANCY IN THIRD TRIMESTER: ICD-10-CM

## 2021-03-15 DIAGNOSIS — O34.33 PREMATURE CERVICAL DILATION IN THIRD TRIMESTER: ICD-10-CM

## 2021-03-15 DIAGNOSIS — O26.03 EXCESSIVE WEIGHT GAIN DURING PREGNANCY IN THIRD TRIMESTER: ICD-10-CM

## 2021-03-15 DIAGNOSIS — Q51.3 BICORNUATE UTERUS: ICD-10-CM

## 2021-03-15 PROBLEM — Z34.90 PREGNANCY: Status: ACTIVE | Noted: 2021-03-15

## 2021-03-15 LAB
BILIRUB BLD-MCNC: NEGATIVE MG/DL
GLUCOSE UR STRIP-MCNC: NEGATIVE MG/DL
KETONES UR QL: NEGATIVE
LEUKOCYTE EST, POC: NEGATIVE
NITRITE UR-MCNC: NEGATIVE MG/ML
PH UR: 5.5 [PH] (ref 5–8)
PROT UR STRIP-MCNC: ABNORMAL MG/DL
RBC # UR STRIP: ABNORMAL /UL
SP GR UR: 1.03 (ref 1–1.03)
UROBILINOGEN UR QL: NORMAL

## 2021-03-15 PROCEDURE — 0502F SUBSEQUENT PRENATAL CARE: CPT | Performed by: OBSTETRICS & GYNECOLOGY

## 2021-03-15 PROCEDURE — 25010000002 BETAMETHASONE ACET & SOD PHOS PER 4 MG: Performed by: OBSTETRICS & GYNECOLOGY

## 2021-03-15 PROCEDURE — 96372 THER/PROPH/DIAG INJ SC/IM: CPT

## 2021-03-15 PROCEDURE — 59025 FETAL NON-STRESS TEST: CPT | Performed by: OBSTETRICS & GYNECOLOGY

## 2021-03-15 PROCEDURE — 59025 FETAL NON-STRESS TEST: CPT

## 2021-03-15 PROCEDURE — G0378 HOSPITAL OBSERVATION PER HR: HCPCS

## 2021-03-15 RX ORDER — BETAMETHASONE SODIUM PHOSPHATE AND BETAMETHASONE ACETATE 3; 3 MG/ML; MG/ML
12 INJECTION, SUSPENSION INTRA-ARTICULAR; INTRALESIONAL; INTRAMUSCULAR; SOFT TISSUE EVERY 24 HOURS
Status: DISCONTINUED | OUTPATIENT
Start: 2021-03-15 | End: 2021-03-15 | Stop reason: HOSPADM

## 2021-03-15 RX ADMIN — BETAMETHASONE ACETATE AND BETAMETHASONE SODIUM PHOSPHATE 12 MG: 3; 3 INJECTION, SUSPENSION INTRA-ARTICULAR; INTRALESIONAL; INTRAMUSCULAR; SOFT TISSUE at 16:15

## 2021-03-15 NOTE — NON STRESS TEST
Davida Quinonez, a  at 33w4d with an BRANDO of 2021, by Ultrasound, [facility] for a nonstress test.    Chief Complaint   Patient presents with   • Non-stress Test     Pt arrives from the MD office for NST and BMZ       Patient Active Problem List   Diagnosis   • Depression   • Bicornuate uterus   • Nausea and vomiting during pregnancy prior to 22 weeks gestation   • Excessive weight gain during pregnancy   • Rh negative status during pregnancy   • Premature cervical dilation in third trimester   • Pregnancy       Start Time: 154  Stop Time:     Interpretation A  Nonstress Test Interpretation A: Reactive (03/15/21 1737 : Kamran Wells, LEE)

## 2021-03-15 NOTE — PROGRESS NOTES
Patient reports vaginal pressure - cervix dilated - to L&D for BMZ and eval    Excessive weight gain - check growth next visit \  Rh neg - s/p Rhogam   PTL warnings   1 weeks

## 2021-03-16 ENCOUNTER — TELEPHONE (OUTPATIENT)
Dept: OBSTETRICS AND GYNECOLOGY | Age: 26
End: 2021-03-16

## 2021-03-16 ENCOUNTER — HOSPITAL ENCOUNTER (OUTPATIENT)
Facility: HOSPITAL | Age: 26
Discharge: HOME | End: 2021-03-16
Attending: OBSTETRICS & GYNECOLOGY | Admitting: OBSTETRICS & GYNECOLOGY

## 2021-03-16 VITALS — TEMPERATURE: 98.1 F | RESPIRATION RATE: 18 BRPM | HEIGHT: 69 IN | WEIGHT: 293 LBS | BODY MASS INDEX: 43.4 KG/M2

## 2021-03-16 PROCEDURE — 59025 FETAL NON-STRESS TEST: CPT

## 2021-03-16 PROCEDURE — 59025 FETAL NON-STRESS TEST: CPT | Performed by: OBSTETRICS & GYNECOLOGY

## 2021-03-16 PROCEDURE — 25010000002 BETAMETHASONE ACET & SOD PHOS PER 4 MG: Performed by: OBSTETRICS & GYNECOLOGY

## 2021-03-16 PROCEDURE — 96372 THER/PROPH/DIAG INJ SC/IM: CPT

## 2021-03-16 PROCEDURE — G0378 HOSPITAL OBSERVATION PER HR: HCPCS

## 2021-03-16 RX ORDER — BETAMETHASONE SODIUM PHOSPHATE AND BETAMETHASONE ACETATE 3; 3 MG/ML; MG/ML
12 INJECTION, SUSPENSION INTRA-ARTICULAR; INTRALESIONAL; INTRAMUSCULAR; SOFT TISSUE EVERY 24 HOURS
Status: DISCONTINUED | OUTPATIENT
Start: 2021-03-16 | End: 2021-03-16 | Stop reason: HOSPADM

## 2021-03-16 RX ADMIN — BETAMETHASONE SODIUM PHOSPHATE AND BETAMETHASONE ACETATE 12 MG: 3; 3 INJECTION, SUSPENSION INTRA-ARTICULAR; INTRALESIONAL; INTRAMUSCULAR at 16:57

## 2021-03-16 NOTE — NON STRESS TEST
Chief complaint-premature cervical dilatation in the third trimester.    NST to monitor fetal wellbeing is reactive and reassuring with moderate variability.  NST lasted approximately 30 minutes.    Follow-up as clinically indicated.    Aryan Myers MD    Davida Quinonez, a  at 33w5d with an BRANDO of 2021, by Ultrasound, was seen at [facility] for a nonstress test.    Chief Complaint   Patient presents with   • Non-stress Test       Patient Active Problem List   Diagnosis   • Depression   • Bicornuate uterus   • Nausea and vomiting during pregnancy prior to 22 weeks gestation   • Excessive weight gain during pregnancy   • Rh negative status during pregnancy   • Premature cervical dilation in third trimester   • Pregnancy       Start Time:163  Stop Time:     Interpretation A  Nonstress Test Interpretation A: Reactive (21 : Nelsy Love, RN)

## 2021-03-18 ENCOUNTER — TELEPHONE (OUTPATIENT)
Dept: OBSTETRICS AND GYNECOLOGY | Age: 26
End: 2021-03-18

## 2021-03-18 NOTE — TELEPHONE ENCOUNTER
----- Message from Davida Quinonez sent at 3/18/2021  9:54 AM EDT -----  Regarding: Non-Urgent Medical Question  Contact: [phone]  Hello,  I left the papers for FMLA at the [place] office  on Tuesday. I went ahead and took the leave on Wednesday.

## 2021-03-18 NOTE — TELEPHONE ENCOUNTER
----- Message from Davida Quinonez sent at 3/18/2021  9:54 AM EDT -----  Regarding: Non-Urgent Medical Question  Contact: [phone]  Hello,  I left the papers for FMLA at the [place] office  on Tuesday. I went ahead and took the leave on Wednesday.

## 2021-03-22 ENCOUNTER — ROUTINE PRENATAL (OUTPATIENT)
Dept: OBSTETRICS AND GYNECOLOGY | Age: 26
End: 2021-03-22

## 2021-03-22 VITALS — DIASTOLIC BLOOD PRESSURE: 70 MMHG | WEIGHT: 293 LBS | BODY MASS INDEX: 44.01 KG/M2 | SYSTOLIC BLOOD PRESSURE: 118 MMHG

## 2021-03-22 DIAGNOSIS — O34.33 PREMATURE CERVICAL DILATION IN THIRD TRIMESTER: ICD-10-CM

## 2021-03-22 DIAGNOSIS — Q51.3 BICORNUATE UTERUS: ICD-10-CM

## 2021-03-22 DIAGNOSIS — Z36.85 ANTENATAL SCREENING FOR STREPTOCOCCUS B: ICD-10-CM

## 2021-03-22 DIAGNOSIS — Z34.03 ENCOUNTER FOR PRENATAL CARE IN THIRD TRIMESTER OF FIRST PREGNANCY: Primary | ICD-10-CM

## 2021-03-22 DIAGNOSIS — Z13.89 SCREENING FOR BLOOD OR PROTEIN IN URINE: ICD-10-CM

## 2021-03-22 DIAGNOSIS — O26.03 EXCESSIVE WEIGHT GAIN DURING PREGNANCY IN THIRD TRIMESTER: ICD-10-CM

## 2021-03-22 DIAGNOSIS — Z67.91 RH NEGATIVE STATUS DURING PREGNANCY IN THIRD TRIMESTER: ICD-10-CM

## 2021-03-22 DIAGNOSIS — O26.893 RH NEGATIVE STATUS DURING PREGNANCY IN THIRD TRIMESTER: ICD-10-CM

## 2021-03-22 PROCEDURE — 0502F SUBSEQUENT PRENATAL CARE: CPT | Performed by: OBSTETRICS & GYNECOLOGY

## 2021-03-26 LAB — B-HEM STREP SPEC QL CULT: NEGATIVE

## 2021-03-30 ENCOUNTER — ROUTINE PRENATAL (OUTPATIENT)
Dept: OBSTETRICS AND GYNECOLOGY | Age: 26
End: 2021-03-30

## 2021-03-30 ENCOUNTER — PREP FOR SURGERY (OUTPATIENT)
Dept: OTHER | Facility: HOSPITAL | Age: 26
End: 2021-03-30

## 2021-03-30 VITALS — BODY MASS INDEX: 44.6 KG/M2 | SYSTOLIC BLOOD PRESSURE: 128 MMHG | DIASTOLIC BLOOD PRESSURE: 80 MMHG | WEIGHT: 293 LBS

## 2021-03-30 DIAGNOSIS — Z34.03 ENCOUNTER FOR PRENATAL CARE IN THIRD TRIMESTER OF FIRST PREGNANCY: Primary | ICD-10-CM

## 2021-03-30 DIAGNOSIS — Z13.89 SCREENING FOR BLOOD OR PROTEIN IN URINE: ICD-10-CM

## 2021-03-30 DIAGNOSIS — Z67.91 RH NEGATIVE STATUS DURING PREGNANCY IN THIRD TRIMESTER: ICD-10-CM

## 2021-03-30 DIAGNOSIS — O26.03 EXCESSIVE WEIGHT GAIN DURING PREGNANCY IN THIRD TRIMESTER: ICD-10-CM

## 2021-03-30 DIAGNOSIS — O26.893 RH NEGATIVE STATUS DURING PREGNANCY IN THIRD TRIMESTER: ICD-10-CM

## 2021-03-30 PROCEDURE — 0502F SUBSEQUENT PRENATAL CARE: CPT | Performed by: OBSTETRICS & GYNECOLOGY

## 2021-03-30 RX ORDER — OXYTOCIN-SODIUM CHLORIDE 0.9% IV SOLN 30 UNIT/500ML 30-0.9/5 UT/ML-%
250 SOLUTION INTRAVENOUS CONTINUOUS PRN
Status: CANCELLED | OUTPATIENT
Start: 2021-03-30 | End: 2021-03-30

## 2021-03-30 RX ORDER — METHYLERGONOVINE MALEATE 0.2 MG/ML
200 INJECTION INTRAVENOUS AS NEEDED
Status: CANCELLED | OUTPATIENT
Start: 2021-03-30

## 2021-03-30 RX ORDER — ONDANSETRON 2 MG/ML
4 INJECTION INTRAMUSCULAR; INTRAVENOUS EVERY 6 HOURS PRN
Status: CANCELLED | OUTPATIENT
Start: 2021-03-30

## 2021-03-30 RX ORDER — OXYTOCIN-SODIUM CHLORIDE 0.9% IV SOLN 30 UNIT/500ML 30-0.9/5 UT/ML-%
999 SOLUTION INTRAVENOUS ONCE
Status: CANCELLED | OUTPATIENT
Start: 2021-03-30 | End: 2021-03-30

## 2021-03-30 RX ORDER — OXYTOCIN-SODIUM CHLORIDE 0.9% IV SOLN 30 UNIT/500ML 30-0.9/5 UT/ML-%
125 SOLUTION INTRAVENOUS CONTINUOUS PRN
Status: CANCELLED | OUTPATIENT
Start: 2021-03-30

## 2021-03-30 RX ORDER — LIDOCAINE HYDROCHLORIDE 10 MG/ML
5 INJECTION, SOLUTION EPIDURAL; INFILTRATION; INTRACAUDAL; PERINEURAL AS NEEDED
Status: CANCELLED | OUTPATIENT
Start: 2021-03-30

## 2021-03-30 RX ORDER — SODIUM CHLORIDE 0.9 % (FLUSH) 0.9 %
3 SYRINGE (ML) INJECTION EVERY 12 HOURS SCHEDULED
Status: CANCELLED | OUTPATIENT
Start: 2021-03-30

## 2021-03-30 RX ORDER — CEFAZOLIN SODIUM IN 0.9 % NACL 3 G/100 ML
3 INTRAVENOUS SOLUTION, PIGGYBACK (ML) INTRAVENOUS ONCE
Status: CANCELLED | OUTPATIENT
Start: 2021-03-30 | End: 2021-03-30

## 2021-03-30 RX ORDER — SODIUM CHLORIDE, SODIUM LACTATE, POTASSIUM CHLORIDE, CALCIUM CHLORIDE 600; 310; 30; 20 MG/100ML; MG/100ML; MG/100ML; MG/100ML
125 INJECTION, SOLUTION INTRAVENOUS CONTINUOUS
Status: CANCELLED | OUTPATIENT
Start: 2021-03-30

## 2021-03-30 RX ORDER — CARBOPROST TROMETHAMINE 250 UG/ML
250 INJECTION, SOLUTION INTRAMUSCULAR AS NEEDED
Status: CANCELLED | OUTPATIENT
Start: 2021-03-30

## 2021-03-30 RX ORDER — ONDANSETRON 4 MG/1
4 TABLET, FILM COATED ORAL EVERY 6 HOURS PRN
Status: CANCELLED | OUTPATIENT
Start: 2021-03-30

## 2021-03-30 RX ORDER — SODIUM CHLORIDE 0.9 % (FLUSH) 0.9 %
10 SYRINGE (ML) INJECTION AS NEEDED
Status: CANCELLED | OUTPATIENT
Start: 2021-03-30

## 2021-03-30 NOTE — PROGRESS NOTES
Patient reports cramping and pressure - declines cervical exam  LGA on US today -   Breech presentation - declines version - Primary  scheduled 21   PTL warnings   1 week

## 2021-04-06 ENCOUNTER — ROUTINE PRENATAL (OUTPATIENT)
Dept: OBSTETRICS AND GYNECOLOGY | Age: 26
End: 2021-04-06

## 2021-04-06 VITALS — WEIGHT: 293 LBS | BODY MASS INDEX: 45.63 KG/M2 | SYSTOLIC BLOOD PRESSURE: 120 MMHG | DIASTOLIC BLOOD PRESSURE: 80 MMHG

## 2021-04-06 DIAGNOSIS — O26.893 RH NEGATIVE STATUS DURING PREGNANCY IN THIRD TRIMESTER: ICD-10-CM

## 2021-04-06 DIAGNOSIS — Z67.91 RH NEGATIVE STATUS DURING PREGNANCY IN THIRD TRIMESTER: ICD-10-CM

## 2021-04-06 DIAGNOSIS — Z34.83 PRENATAL CARE, SUBSEQUENT PREGNANCY IN THIRD TRIMESTER: Primary | ICD-10-CM

## 2021-04-06 DIAGNOSIS — Z13.89 SCREENING FOR BLOOD OR PROTEIN IN URINE: ICD-10-CM

## 2021-04-06 DIAGNOSIS — O26.03 EXCESSIVE WEIGHT GAIN DURING PREGNANCY IN THIRD TRIMESTER: ICD-10-CM

## 2021-04-06 LAB
BILIRUB BLD-MCNC: NEGATIVE MG/DL
GLUCOSE UR STRIP-MCNC: NEGATIVE MG/DL
KETONES UR QL: ABNORMAL
LEUKOCYTE EST, POC: ABNORMAL
NITRITE UR-MCNC: NEGATIVE MG/ML
PH UR: 7 [PH] (ref 5–8)
PROT UR STRIP-MCNC: NEGATIVE MG/DL
RBC # UR STRIP: NEGATIVE /UL
SP GR UR: 1.02 (ref 1–1.03)
UROBILINOGEN UR QL: NORMAL

## 2021-04-06 PROCEDURE — 0502F SUBSEQUENT PRENATAL CARE: CPT | Performed by: OBSTETRICS & GYNECOLOGY

## 2021-04-06 NOTE — PROGRESS NOTES
Patient denies vag bleeding or loss of fluid   Reports [facility]  Breech - primary  scheduled   Labor warnings/FKC  1 week  GBS neg

## 2021-04-08 ENCOUNTER — TELEPHONE (OUTPATIENT)
Dept: OBSTETRICS AND GYNECOLOGY | Age: 26
End: 2021-04-08

## 2021-04-08 ENCOUNTER — HOSPITAL ENCOUNTER (EMERGENCY)
Facility: HOSPITAL | Age: 26
Discharge: HOME | End: 2021-04-08
Attending: OBSTETRICS & GYNECOLOGY | Admitting: OBSTETRICS & GYNECOLOGY

## 2021-04-08 VITALS
RESPIRATION RATE: 18 BRPM | SYSTOLIC BLOOD PRESSURE: 116 MMHG | BODY MASS INDEX: 43.4 KG/M2 | OXYGEN SATURATION: 99 % | HEIGHT: 69 IN | WEIGHT: 293 LBS | TEMPERATURE: 98.2 F | DIASTOLIC BLOOD PRESSURE: 79 MMHG | HEART RATE: 97 BPM

## 2021-04-08 LAB
BACTERIA UR QL AUTO: ABNORMAL /HPF
BILIRUB UR QL STRIP: NEGATIVE
CLARITY UR: CLEAR
COLOR UR: YELLOW
GLUCOSE UR STRIP-MCNC: NEGATIVE MG/DL
HGB UR QL STRIP.AUTO: NEGATIVE
HYALINE CASTS UR QL AUTO: ABNORMAL /LPF
KETONES UR QL STRIP: ABNORMAL
LEUKOCYTE ESTERASE UR QL STRIP.AUTO: ABNORMAL
NITRITE UR QL STRIP: NEGATIVE
PH UR STRIP.AUTO: 6.5 [PH] (ref 5–8)
PROT UR QL STRIP: ABNORMAL
RBC # UR: ABNORMAL /HPF
REF LAB TEST METHOD: ABNORMAL
SP GR UR STRIP: 1.02 (ref 1–1.03)
SQUAMOUS #/AREA URNS HPF: ABNORMAL /HPF
UROBILINOGEN UR QL STRIP: ABNORMAL
WBC UR QL AUTO: ABNORMAL /HPF

## 2021-04-08 PROCEDURE — 99284 EMERGENCY DEPT VISIT MOD MDM: CPT | Performed by: OBSTETRICS & GYNECOLOGY

## 2021-04-08 PROCEDURE — 81001 URINALYSIS AUTO W/SCOPE: CPT | Performed by: OBSTETRICS & GYNECOLOGY

## 2021-04-08 PROCEDURE — 59025 FETAL NON-STRESS TEST: CPT

## 2021-04-08 PROCEDURE — 87086 URINE CULTURE/COLONY COUNT: CPT | Performed by: OBSTETRICS & GYNECOLOGY

## 2021-04-08 NOTE — OBED NOTES
[facility]  Davida Quinonez  : 1995  MRN: [id number]  CSN: [id number]    OB ED Provider Note    Subjective   Chief Complaint   Patient presents with   • Laboring     pt to garcia with cc of cramping since last evening, pt denies LOF or VB and report +fm     Davida Quinonez is a 26 y.o. year old  with an Estimated Date of Delivery: 21 currently at 37w0d presenting with pelvic cramping and pressure since last night.  She is unsure if she is loyd.  She denies ROM or VB.  FM is present.      Prenatal care has been with Dr. Barrera.  It has been complicated by malpresentation (breech).    OB History    Para Term  AB Living   2 0 0 0 1 0   SAB TAB Ectopic Molar Multiple Live Births   1 0 0 0 0 0      # Outcome Date GA Lbr Celestine/2nd Weight Sex Delivery Anes PTL Lv   2 Current            1 SAB 20 7w3d             Obstetric Comments   20 - IUP at 6-5 weeks - BRANDO 21 - [facility]    12/3/20 - 19-0 weeks - Normal CL and normal but INCOMPLETE anatomy - F    21 - 26-5 weeks EFW 58%, AC 77% Normal completed anatomy - F      3/1/21 - 31-4 weeks - AC 43%, AC 49% -[facility]    3/30/21- 35-5 weeks - EFW 74%, AC 96% -[facility]      Past Medical History:   Diagnosis Date   • Abnormal Pap smear of cervix    • ADHD (attention deficit hyperactivity disorder)    • Anxiety    • Depression    • HPV in female    • Miscarriage    • Sleep apnea     DOESN'T USE MACHINE... LOST 126LBS. JORGE LUIS. FOR NEW SLEEP STUDY     Past Surgical History:   Procedure Laterality Date   • D & C WITH SUCTION N/A 2020    Procedure: DILATATION AND CURETTAGE WITH SUCTION;  Surgeon: Yadi Barrera MD;  Location: [facility] MAIN OR;  Service: Obstetrics/Gynecology;  Laterality: N/A;   • ADENOIDECTOMY     • CHOLECYSTECTOMY     • EAR TUBES     • TONSILLECTOMY     • WISDOM TOOTH EXTRACTION       No current facility-administered medications for this encounter.    No Known Allergies  Social History    Tobacco Use       "Smoking status: Never Smoker      Smokeless tobacco: Never Used    Review of Systems   All other systems reviewed and are negative.        Objective   /79   Pulse 97   Temp 98.2 °F (36.8 °C) (Oral)   Resp 18   Ht 175.3 cm (69\")   Wt (!) 138 kg (304 lb)   LMP  (LMP Unknown)   SpO2 99%   BMI 44.89 kg/m²   General: well developed; well nourished  no acute distress   Abdomen: soft, non-tender; no masses  gravid    FHT's: reactive, variable decelerations are present, mild, infrequent and category 1      Cervix: was checked (by RN): 0.5 cm / 50 % / -2   Presentation: breech   Contractions: none   Chest: Unlabored respirations    CV:  RRR   Ext:   No C/C/1+ BLE to ankles   Back: SI tenderness is present bilateral        Prenatal Labs  Lab Results   Component Value Date    HGB 12.5 01/26/2021    RUBELLAABIGG 2.78 09/08/2020    HEPBSAG Negative 09/08/2020    ABSCRN Negative 01/26/2021    UKS3TZQ4 Non Reactive 09/08/2020    HEPCVIRUSABY <0.1 09/08/2020     01/26/2021    STREPGPB Negative 03/22/2021    URINECX 25,000 CFU/mL Mixed Breanna Isolated 02/08/2021    CHLAMNAA Negative 01/08/2021    NGONORRHON Negative 01/08/2021       Current Labs Reviewed   UA:    Lab Results   Component Value Date    SQUAMEPIUA 7-12 (A) 04/08/2021    SPECGRAVUR 1.019 04/08/2021    KETONESU 15 mg/dL (1+) (A) 04/08/2021    BLOODU Negative 04/08/2021    LEUKOCYTESUR Small (1+) (A) 04/08/2021    NITRITEU Negative 04/08/2021    RBCUA 0-2 04/08/2021    WBCUA 6-12 (A) 04/08/2021    BACTERIA 2+ (A) 04/08/2021          Assessment   1. IUP at 37w0d  2. Pelvic pressure- no evidence of labor     Plan   1. D/C home with labor and pre-eclampsia precautions.  I educated her on labor signs.  Keep regularly scheduled prenatal appointments.      Nacho Solano MD  4/8/2021  12:59 EDT     "

## 2021-04-08 NOTE — TELEPHONE ENCOUNTER
Patient called and is complaining of back pain and cramping since 1100 last night.  She just wanted to let us know that she is heading to Labor and Delivery now.  She is also calling Labor and Delivery to let them know she is coming in.

## 2021-04-09 LAB — BACTERIA SPEC AEROBE CULT: NORMAL

## 2021-04-13 ENCOUNTER — ROUTINE PRENATAL (OUTPATIENT)
Dept: OBSTETRICS AND GYNECOLOGY | Age: 26
End: 2021-04-13

## 2021-04-13 VITALS — DIASTOLIC BLOOD PRESSURE: 80 MMHG | BODY MASS INDEX: 45.63 KG/M2 | WEIGHT: 293 LBS | SYSTOLIC BLOOD PRESSURE: 128 MMHG

## 2021-04-13 DIAGNOSIS — Q51.3 BICORNUATE UTERUS: ICD-10-CM

## 2021-04-13 DIAGNOSIS — Z67.91 RH NEGATIVE STATUS DURING PREGNANCY IN THIRD TRIMESTER: ICD-10-CM

## 2021-04-13 DIAGNOSIS — O26.03 EXCESSIVE WEIGHT GAIN DURING PREGNANCY IN THIRD TRIMESTER: ICD-10-CM

## 2021-04-13 DIAGNOSIS — O26.893 RH NEGATIVE STATUS DURING PREGNANCY IN THIRD TRIMESTER: ICD-10-CM

## 2021-04-13 DIAGNOSIS — Z13.89 SCREENING FOR BLOOD OR PROTEIN IN URINE: ICD-10-CM

## 2021-04-13 DIAGNOSIS — Z34.03 ENCOUNTER FOR PRENATAL CARE IN THIRD TRIMESTER OF FIRST PREGNANCY: Primary | ICD-10-CM

## 2021-04-13 LAB
BILIRUB BLD-MCNC: NEGATIVE MG/DL
GLUCOSE UR STRIP-MCNC: NEGATIVE MG/DL
KETONES UR QL: ABNORMAL
LEUKOCYTE EST, POC: ABNORMAL
NITRITE UR-MCNC: NEGATIVE MG/ML
PH UR: 6 [PH] (ref 5–8)
PROT UR STRIP-MCNC: NEGATIVE MG/DL
RBC # UR STRIP: NEGATIVE /UL
SP GR UR: 1.02 (ref 1–1.03)
UROBILINOGEN UR QL: NORMAL

## 2021-04-13 PROCEDURE — 0502F SUBSEQUENT PRENATAL CARE: CPT | Performed by: OBSTETRICS & GYNECOLOGY

## 2021-04-13 NOTE — PROGRESS NOTES
Patient denies ctx, loss of fluid or vag bleeding +fM  Breech - primary  at 39 weeks   Labor warnings/FKC   1 week

## 2021-04-15 ENCOUNTER — TELEPHONE (OUTPATIENT)
Dept: OBSTETRICS AND GYNECOLOGY | Age: 26
End: 2021-04-15

## 2021-04-15 ENCOUNTER — HOSPITAL ENCOUNTER (EMERGENCY)
Facility: HOSPITAL | Age: 26
Discharge: HOME | End: 2021-04-15
Attending: OBSTETRICS & GYNECOLOGY | Admitting: OBSTETRICS & GYNECOLOGY

## 2021-04-15 VITALS
DIASTOLIC BLOOD PRESSURE: 76 MMHG | OXYGEN SATURATION: 98 % | TEMPERATURE: 98 F | RESPIRATION RATE: 18 BRPM | SYSTOLIC BLOOD PRESSURE: 135 MMHG | BODY MASS INDEX: 43.4 KG/M2 | HEART RATE: 97 BPM | HEIGHT: 69 IN | WEIGHT: 293 LBS

## 2021-04-15 LAB
ALBUMIN SERPL-MCNC: 3.7 G/DL (ref 3.5–5.2)
ALBUMIN/GLOB SERPL: 1.5 G/DL
ALP SERPL-CCNC: 112 U/L (ref 39–117)
ALT SERPL W P-5'-P-CCNC: 14 U/L (ref 1–33)
ANION GAP SERPL CALCULATED.3IONS-SCNC: 9.7 MMOL/L (ref 5–15)
AST SERPL-CCNC: 12 U/L (ref 1–32)
BACTERIA UR QL AUTO: ABNORMAL /HPF
BASOPHILS # BLD AUTO: 0.02 10*3/MM3 (ref 0–0.2)
BASOPHILS NFR BLD AUTO: 0.2 % (ref 0–1.5)
BILIRUB SERPL-MCNC: 0.2 MG/DL (ref 0–1.2)
BILIRUB UR QL STRIP: NEGATIVE
BUN SERPL-MCNC: 8 MG/DL (ref 6–20)
BUN/CREAT SERPL: 15.4 (ref 7–25)
CALCIUM SPEC-SCNC: 9.1 MG/DL (ref 8.6–10.5)
CHLORIDE SERPL-SCNC: 106 MMOL/L (ref 98–107)
CLARITY UR: ABNORMAL
CO2 SERPL-SCNC: 21.3 MMOL/L (ref 22–29)
COLOR UR: YELLOW
CREAT SERPL-MCNC: 0.52 MG/DL (ref 0.57–1)
CREAT UR-MCNC: 112.6 MG/DL
DEPRECATED RDW RBC AUTO: 40.4 FL (ref 37–54)
EOSINOPHIL # BLD AUTO: 0.1 10*3/MM3 (ref 0–0.4)
EOSINOPHIL NFR BLD AUTO: 1 % (ref 0.3–6.2)
ERYTHROCYTE [DISTWIDTH] IN BLOOD BY AUTOMATED COUNT: 12.8 % (ref 12.3–15.4)
GFR SERPL CREATININE-BSD FRML MDRD: 141 ML/MIN/1.73
GLOBULIN UR ELPH-MCNC: 2.4 GM/DL
GLUCOSE SERPL-MCNC: 114 MG/DL (ref 65–99)
GLUCOSE UR STRIP-MCNC: NEGATIVE MG/DL
HCT VFR BLD AUTO: 36 % (ref 34–46.6)
HGB BLD-MCNC: 12 G/DL (ref 12–15.9)
HGB UR QL STRIP.AUTO: NEGATIVE
HYALINE CASTS UR QL AUTO: ABNORMAL /LPF
IMM GRANULOCYTES # BLD AUTO: 0.14 10*3/MM3 (ref 0–0.05)
IMM GRANULOCYTES NFR BLD AUTO: 1.3 % (ref 0–0.5)
KETONES UR QL STRIP: ABNORMAL
LEUKOCYTE ESTERASE UR QL STRIP.AUTO: ABNORMAL
LYMPHOCYTES # BLD AUTO: 1.98 10*3/MM3 (ref 0.7–3.1)
LYMPHOCYTES NFR BLD AUTO: 18.9 % (ref 19.6–45.3)
MCH RBC QN AUTO: 29.1 PG (ref 26.6–33)
MCHC RBC AUTO-ENTMCNC: 33.3 G/DL (ref 31.5–35.7)
MCV RBC AUTO: 87.2 FL (ref 79–97)
MONOCYTES # BLD AUTO: 0.8 10*3/MM3 (ref 0.1–0.9)
MONOCYTES NFR BLD AUTO: 7.6 % (ref 5–12)
NEUTROPHILS NFR BLD AUTO: 7.43 10*3/MM3 (ref 1.7–7)
NEUTROPHILS NFR BLD AUTO: 71 % (ref 42.7–76)
NITRITE UR QL STRIP: NEGATIVE
NRBC BLD AUTO-RTO: 0 /100 WBC (ref 0–0.2)
PH UR STRIP.AUTO: 6 [PH] (ref 5–8)
PLATELET # BLD AUTO: 265 10*3/MM3 (ref 140–450)
PMV BLD AUTO: 8.8 FL (ref 6–12)
POTASSIUM SERPL-SCNC: 4.4 MMOL/L (ref 3.5–5.2)
PROT SERPL-MCNC: 6.1 G/DL (ref 6–8.5)
PROT UR QL STRIP: ABNORMAL
PROT UR-MCNC: 21.9 MG/DL
PROT/CREAT UR: 194.5 MG/G CREA (ref 0–200)
RBC # BLD AUTO: 4.13 10*6/MM3 (ref 3.77–5.28)
RBC # UR: ABNORMAL /HPF
REF LAB TEST METHOD: ABNORMAL
SODIUM SERPL-SCNC: 137 MMOL/L (ref 136–145)
SP GR UR STRIP: 1.02 (ref 1–1.03)
SQUAMOUS #/AREA URNS HPF: ABNORMAL /HPF
UROBILINOGEN UR QL STRIP: ABNORMAL
WBC # BLD AUTO: 10.47 10*3/MM3 (ref 3.4–10.8)
WBC UR QL AUTO: ABNORMAL /HPF

## 2021-04-15 PROCEDURE — 59025 FETAL NON-STRESS TEST: CPT

## 2021-04-15 PROCEDURE — 99284 EMERGENCY DEPT VISIT MOD MDM: CPT | Performed by: OBSTETRICS & GYNECOLOGY

## 2021-04-15 PROCEDURE — 84156 ASSAY OF PROTEIN URINE: CPT | Performed by: OBSTETRICS & GYNECOLOGY

## 2021-04-15 PROCEDURE — 80053 COMPREHEN METABOLIC PANEL: CPT | Performed by: OBSTETRICS & GYNECOLOGY

## 2021-04-15 PROCEDURE — 87086 URINE CULTURE/COLONY COUNT: CPT | Performed by: OBSTETRICS & GYNECOLOGY

## 2021-04-15 PROCEDURE — 82570 ASSAY OF URINE CREATININE: CPT | Performed by: OBSTETRICS & GYNECOLOGY

## 2021-04-15 PROCEDURE — 81001 URINALYSIS AUTO W/SCOPE: CPT | Performed by: OBSTETRICS & GYNECOLOGY

## 2021-04-15 PROCEDURE — 85025 COMPLETE CBC W/AUTO DIFF WBC: CPT | Performed by: OBSTETRICS & GYNECOLOGY

## 2021-04-15 NOTE — NURSING NOTE
This RN reviewed discharge instructions with patient. Discussed s/sx of labor, contractions, vaginal bleeding, decreased fetal movement and leakage of fluid. Pt aware when to return to hospital or notify MD. All questions and concerns addressed at this time. Pt encouraged to keep all upcoming OB appointments. Patient ambulated off floor at this time.

## 2021-04-15 NOTE — OBED NOTES
[facility] Note OB        Patient Name: Davida Quinonez  YOB: 1995  MRN: [id number]  Admission Date: 4/15/2021  1:42 PM  Date of Service: 4/15/2021    Chief Complaint: Contractions (ayanna C/S for breech on 21)        Subjective     Davida Quinonez is a 26 y.o. female  at 38w0d with Estimated Date of Delivery: 21 who presents with the chief complaint listed above.  She sees Yadi Barrera MD for her prenatal care. Her pregnancy has been complicated by:  obesity, fetal malpresentation.    Patient complaining of intermittent contractions. She denies LOF, DFM, RUQ pain, visual changes, headache, or dyspnea.    She describes fetal movement as normal.  She denies rupture of membranes.  She denies vaginal bleeding. She is feeling contractions.          Objective   Patient Active Problem List    Diagnosis    • *Breech presentation, no version [O32.1XX0]    • Breech presentation of fetus [O32.1XX0]    • Premature cervical dilation in third trimester [O34.33]    • Pregnancy [Z34.90]    • Rh negative status during pregnancy [O26.899, Z67.91]    • Excessive weight gain during pregnancy [O26.00]    • Nausea and vomiting during pregnancy prior to 22 weeks gestation [O21.9]    • Bicornuate uterus [Q51.3]    • Depression [F32.9]         OB History    Para Term  AB Living   2 0 0 0 1 0   SAB TAB Ectopic Molar Multiple Live Births   1 0 0 0 0 0      # Outcome Date GA Lbr Celestine/2nd Weight Sex Delivery Anes PTL Lv   2 Current            1 SAB 20 7w3d             Obstetric Comments   20 - IUP at 6-5 weeks - BRANDO 21 - F    12/3/20 - 19-0 weeks - Normal CL and normal but INCOMPLETE anatomy - [facility]    21 - 26-5 weeks EFW 58%, AC 77% Normal completed anatomy - [facility]      3/1/21 - 31-4 weeks - AC 43%, AC 49% -[facility]    3/30/21- 35-5 weeks - EFW 74%, AC 96% -[facility]         Past Medical History:   Diagnosis Date   • Abnormal Pap smear of cervix    • ADHD (attention deficit  hyperactivity disorder)    • Anxiety    • Depression    • HPV in female    • Miscarriage    • Sleep apnea     DOESN'T USE MACHINE... LOST 126LBS. JORGE LUIS. FOR NEW SLEEP STUDY       Past Surgical History:   Procedure Laterality Date   • ADENOIDECTOMY     • CHOLECYSTECTOMY     • D & C WITH SUCTION N/A 6/11/2020    Procedure: DILATATION AND CURETTAGE WITH SUCTION;  Surgeon: Yadi Barrera MD;  Location: [facility] OR;  Service: Obstetrics/Gynecology;  Laterality: N/A;   • EAR TUBES     • LAPAROSCOPIC CHOLECYSTECTOMY     • TONSILLECTOMY     • WISDOM TOOTH EXTRACTION         No current facility-administered medications on file prior to encounter.     Current Outpatient Medications on File Prior to Encounter   Medication Sig Dispense Refill   • Prenatal Vit-Fe Fumarate-FA (PRENATAL, CLASSIC, VITAMIN) 28-0.8 MG tablet tablet Take  by mouth Daily.         No Known Allergies    Family History   Problem Relation Age of Onset   • Thyroid disease Mother    • Depression Mother    • Hypertension Father    • Hyperlipidemia Father    • Cancer Maternal Grandmother    • Pancreatic cancer Maternal Grandmother    • Pancreatitis Maternal Grandmother    • No Known Problems Brother    • No Known Problems Sister    • Ovarian cancer Other    • Malig Hyperthermia Neg Hx    • Breast cancer Neg Hx    • Uterine cancer Neg Hx    • Colon cancer Neg Hx        Social History     Socioeconomic History   • Marital status:      Spouse name: Not on file   • Number of children: Not on file   • Years of education: Not on file   • Highest education level: Not on file   Tobacco Use   • Smoking status: Never Smoker   • Smokeless tobacco: Never Used   Substance and Sexual Activity   • Alcohol use: No   • Drug use: No   • Sexual activity: Yes     Partners: Male     Birth control/protection: None           Review of Systems   Constitutional: Negative for chills, fatigue and fever.   HENT: Negative for congestion, rhinorrhea and sore throat.    Eyes:  Negative for visual disturbance.   Respiratory: Negative.    Cardiovascular: Negative.    Gastrointestinal: Positive for abdominal pain. Negative for constipation, diarrhea, nausea and vomiting.   Genitourinary: Negative for difficulty urinating, dyspareunia, dysuria, flank pain, frequency, genital sores, hematuria, pelvic pain, urgency, vaginal bleeding, vaginal discharge and vaginal pain.   Neurological: Negative for dizziness, seizures, light-headedness and headaches.   Psychiatric/Behavioral: Negative for sleep disturbance. The patient is not nervous/anxious.           PHYSICAL EXAM:      VITAL SIGNS:  Vitals:    04/15/21 1416 04/15/21 1430 04/15/21 1436 04/15/21 1450   BP: 141/76 141/84 141/84 138/85   BP Location:       Patient Position:       Pulse: 101 102 102 94   Resp:       Temp:       TempSrc:       SpO2:       Weight:       Height:            FHT'S:                   Baseline:  145 BPM  Variability:  Moderate = 6 - 25 BPM  Accelerations:  15 x 15 accelerations present     Decelerations:  absent  Contractions:   absent     Interpretation:    Reactive NST, CAT 1 tracing        PHYSICAL EXAM:    General: well developed; well nourished  no acute distress   Heart: Not performed.   Lungs  : breathing is unlabored     Abdomen: soft, non-tender; no masses  no umbilical or inguinal hernias are present  no hepato-splenomegaly       Cervix: was checked (by RN): 2 cm / 50 % / -2  Cervical Dilation (cm): 2  Cervical Effacement: 50%  Fetal Station: -2  Cervical Consistency: soft  Cervical Position: posterior   Contractions: none        Extremities: peripheral pulses normal, no pedal edema, no clubbing or cyanosis      LABS AND TESTING ORDERED:  1. Uterine and fetal monitoring  2. Urinalysis  3. CBC, CMP, urine P:C    LAB RESULTS:    Recent Results (from the past 24 hour(s))   Urinalysis With Culture If Indicated - Urine, Clean Catch    Collection Time: 04/15/21  2:14 PM    Specimen: Urine, Clean Catch   Result Value  Ref Range    Color, UA Yellow Yellow, Straw    Appearance, UA Cloudy (A) Clear    pH, UA 6.0 5.0 - 8.0    Specific Gravity, UA 1.023 1.005 - 1.030    Glucose, UA Negative Negative    Ketones, UA Trace (A) Negative    Bilirubin, UA Negative Negative    Blood, UA Negative Negative    Protein, UA Trace (A) Negative    Leuk Esterase, UA Small (1+) (A) Negative    Nitrite, UA Negative Negative    Urobilinogen, UA 1.0 E.U./dL 0.2 - 1.0 E.U./dL   Urinalysis, Microscopic Only - Urine, Clean Catch    Collection Time: 04/15/21  2:14 PM    Specimen: Urine, Clean Catch   Result Value Ref Range    RBC, UA None Seen None Seen, 0-2 /HPF    WBC, UA 21-30 (A) None Seen, 0-2 /HPF    Bacteria, UA 2+ (A) None Seen /HPF    Squamous Epithelial Cells, UA 21-30 (A) None Seen, 0-2 /HPF    Hyaline Casts, UA 3-6 None Seen /LPF    Methodology Manual Light Microscopy    Comprehensive Metabolic Panel    Collection Time: 04/15/21  2:42 PM    Specimen: Blood   Result Value Ref Range    Glucose 114 (H) 65 - 99 mg/dL    BUN 8 6 - 20 mg/dL    Creatinine 0.52 (L) 0.57 - 1.00 mg/dL    Sodium 137 136 - 145 mmol/L    Potassium 4.4 3.5 - 5.2 mmol/L    Chloride 106 98 - 107 mmol/L    CO2 21.3 (L) 22.0 - 29.0 mmol/L    Calcium 9.1 8.6 - 10.5 mg/dL    Total Protein 6.1 6.0 - 8.5 g/dL    Albumin 3.70 3.50 - 5.20 g/dL    ALT (SGPT) 14 1 - 33 U/L    AST (SGOT) 12 1 - 32 U/L    Alkaline Phosphatase 112 39 - 117 U/L    Total Bilirubin 0.2 0.0 - 1.2 mg/dL    eGFR Non African Amer 141 >60 mL/min/1.73    Globulin 2.4 gm/dL    A/G Ratio 1.5 g/dL    BUN/Creatinine Ratio 15.4 7.0 - 25.0    Anion Gap 9.7 5.0 - 15.0 mmol/L   CBC Auto Differential    Collection Time: 04/15/21  2:42 PM    Specimen: Blood   Result Value Ref Range    WBC 10.47 3.40 - 10.80 10*3/mm3    RBC 4.13 3.77 - 5.28 10*6/mm3    Hemoglobin 12.0 12.0 - 15.9 g/dL    Hematocrit 36.0 34.0 - 46.6 %    MCV 87.2 79.0 - 97.0 fL    MCH 29.1 26.6 - 33.0 pg    MCHC 33.3 31.5 - 35.7 g/dL    RDW 12.8 12.3 - 15.4 %     RDW-SD 40.4 37.0 - 54.0 fl    MPV 8.8 6.0 - 12.0 fL    Platelets 265 140 - 450 10*3/mm3    Neutrophil % 71.0 42.7 - 76.0 %    Lymphocyte % 18.9 (L) 19.6 - 45.3 %    Monocyte % 7.6 5.0 - 12.0 %    Eosinophil % 1.0 0.3 - 6.2 %    Basophil % 0.2 0.0 - 1.5 %    Immature Grans % 1.3 (H) 0.0 - 0.5 %    Neutrophils, Absolute 7.43 (H) 1.70 - 7.00 10*3/mm3    Lymphocytes, Absolute 1.98 0.70 - 3.10 10*3/mm3    Monocytes, Absolute 0.80 0.10 - 0.90 10*3/mm3    Eosinophils, Absolute 0.10 0.00 - 0.40 10*3/mm3    Basophils, Absolute 0.02 0.00 - 0.20 10*3/mm3    Immature Grans, Absolute 0.14 (H) 0.00 - 0.05 10*3/mm3    nRBC 0.0 0.0 - 0.2 /100 WBC       Lab Results   Component Value Date    ABO A 2020    RH Negative 2020       Lab Results   Component Value Date    STREPGPB Negative 2021                 Assessment/Plan     ASSESSMENT/PLAN:  Davida Quinonez is a 26 y.o. female  at 38w0d who presented with: complaint of contractions.  Cervix unchanged from prior exam and no contractions seen on tocometer.  Patient incidentally noted to have mildly elevated blood pressures.  She is asymptomatic.  Pre-eclampsia labs performed and normal.  This is patient's first episode of elevated blood pressure.  Follow-up discussed with Dr. Nunez, on call physician for patient's practice.  He would like patient to come in for NST this weekend.  Plan discussed with patient who prefers to come in  when Dr. Barrera is on call.  She was advised to remain NPO after midnight until being seen on the day in case delivery via  is indicated for gestational hypertension.  Pre-eclampsia and regular return precautions reviewed.  Patient discharged to home.          Final Impression:  • Pregnancy at 38w0d  • Reactive NST.  CAT 1 tracing  • Transient new onset hypertension               Vitals:    04/15/21 1416 04/15/21 1430 04/15/21 1436 04/15/21 1450   BP: 141/76 141/84 141/84 138/85   BP Location:       Patient Position:        Pulse: 101 102 102 94   Resp:       Temp:       TempSrc:       SpO2:       Weight:       Height:       •     Lab Results   Component Value Date    STREPGPB Negative 03/22/2021   •   Lab Results   Component Value Date    ABO A 09/08/2020    RH Negative 09/08/2020   •   • COVID - 19 status unknown      PLAN:       I have spent 45 minutes including face to face time with the patient, greater than 50% in discussion of the diagnosis (counseling) and/or coordination of care.     Jennifer Brandt MD  4/15/2021  15:26 EDT  OB Hospitalist  Phone:  [phone]

## 2021-04-15 NOTE — TELEPHONE ENCOUNTER
Patient called stating she is 38 weeks pregnant and believes she is experiencing back labor.  She states her baby is breech and she is scheduled for a  Section on .  Patient was advised to go to [facility] Labor and Delivery for evaluation.

## 2021-04-16 ENCOUNTER — BULK ORDERING (OUTPATIENT)
Dept: CASE MANAGEMENT | Facility: OTHER | Age: 26
End: 2021-04-16

## 2021-04-16 DIAGNOSIS — Z23 IMMUNIZATION DUE: ICD-10-CM

## 2021-04-16 LAB — BACTERIA SPEC AEROBE CULT: NORMAL

## 2021-04-18 ENCOUNTER — ANESTHESIA EVENT (OUTPATIENT)
Dept: LABOR AND DELIVERY | Facility: HOSPITAL | Age: 26
End: 2021-04-18

## 2021-04-18 ENCOUNTER — HOSPITAL ENCOUNTER (INPATIENT)
Facility: HOSPITAL | Age: 26
LOS: 2 days | Discharge: HOME | End: 2021-04-20
Attending: OBSTETRICS & GYNECOLOGY | Admitting: OBSTETRICS & GYNECOLOGY

## 2021-04-18 ENCOUNTER — ANESTHESIA (OUTPATIENT)
Dept: LABOR AND DELIVERY | Facility: HOSPITAL | Age: 26
End: 2021-04-18

## 2021-04-18 PROBLEM — O99.210 OBESITY IN PREGNANCY, ANTEPARTUM: Status: ACTIVE | Noted: 2021-04-18

## 2021-04-18 PROBLEM — O13.9 GESTATIONAL HYPERTENSION WITHOUT SIGNIFICANT PROTEINURIA: Status: ACTIVE | Noted: 2021-04-18

## 2021-04-18 LAB
ABO GROUP BLD: NORMAL
ALBUMIN SERPL-MCNC: 4 G/DL (ref 3.5–5.2)
ALBUMIN/GLOB SERPL: 1.4 G/DL
ALP SERPL-CCNC: 114 U/L (ref 39–117)
ALT SERPL W P-5'-P-CCNC: 11 U/L (ref 1–33)
ANION GAP SERPL CALCULATED.3IONS-SCNC: 12.4 MMOL/L (ref 5–15)
AST SERPL-CCNC: 15 U/L (ref 1–32)
BASOPHILS # BLD AUTO: 0.03 10*3/MM3 (ref 0–0.2)
BASOPHILS NFR BLD AUTO: 0.2 % (ref 0–1.5)
BILIRUB SERPL-MCNC: 0.2 MG/DL (ref 0–1.2)
BLD GP AB SCN SERPL QL: NEGATIVE
BUN SERPL-MCNC: 7 MG/DL (ref 6–20)
BUN/CREAT SERPL: 17.5 (ref 7–25)
CALCIUM SPEC-SCNC: 9.3 MG/DL (ref 8.6–10.5)
CHLORIDE SERPL-SCNC: 103 MMOL/L (ref 98–107)
CO2 SERPL-SCNC: 18.6 MMOL/L (ref 22–29)
CREAT SERPL-MCNC: 0.4 MG/DL (ref 0.57–1)
CREAT UR-MCNC: 64.4 MG/DL
DEPRECATED RDW RBC AUTO: 39.2 FL (ref 37–54)
DEPRECATED RDW RBC AUTO: 40 FL (ref 37–54)
EOSINOPHIL # BLD AUTO: 0.06 10*3/MM3 (ref 0–0.4)
EOSINOPHIL NFR BLD AUTO: 0.5 % (ref 0.3–6.2)
ERYTHROCYTE [DISTWIDTH] IN BLOOD BY AUTOMATED COUNT: 12.6 % (ref 12.3–15.4)
ERYTHROCYTE [DISTWIDTH] IN BLOOD BY AUTOMATED COUNT: 12.9 % (ref 12.3–15.4)
GFR SERPL CREATININE-BSD FRML MDRD: >150 ML/MIN/1.73
GLOBULIN UR ELPH-MCNC: 2.8 GM/DL
GLUCOSE SERPL-MCNC: 86 MG/DL (ref 65–99)
HCT VFR BLD AUTO: 30.9 % (ref 34–46.6)
HCT VFR BLD AUTO: 38 % (ref 34–46.6)
HGB BLD-MCNC: 10.4 G/DL (ref 12–15.9)
HGB BLD-MCNC: 12.7 G/DL (ref 12–15.9)
IMM GRANULOCYTES # BLD AUTO: 0.13 10*3/MM3 (ref 0–0.05)
IMM GRANULOCYTES NFR BLD AUTO: 1 % (ref 0–0.5)
LYMPHOCYTES # BLD AUTO: 2.25 10*3/MM3 (ref 0.7–3.1)
LYMPHOCYTES NFR BLD AUTO: 17.7 % (ref 19.6–45.3)
MCH RBC QN AUTO: 28.7 PG (ref 26.6–33)
MCH RBC QN AUTO: 29 PG (ref 26.6–33)
MCHC RBC AUTO-ENTMCNC: 33.4 G/DL (ref 31.5–35.7)
MCHC RBC AUTO-ENTMCNC: 33.7 G/DL (ref 31.5–35.7)
MCV RBC AUTO: 85.8 FL (ref 79–97)
MCV RBC AUTO: 86.1 FL (ref 79–97)
MONOCYTES # BLD AUTO: 0.73 10*3/MM3 (ref 0.1–0.9)
MONOCYTES NFR BLD AUTO: 5.8 % (ref 5–12)
NEUTROPHILS NFR BLD AUTO: 74.8 % (ref 42.7–76)
NEUTROPHILS NFR BLD AUTO: 9.49 10*3/MM3 (ref 1.7–7)
NRBC BLD AUTO-RTO: 0 /100 WBC (ref 0–0.2)
PLATELET # BLD AUTO: 257 10*3/MM3 (ref 140–450)
PLATELET # BLD AUTO: 263 10*3/MM3 (ref 140–450)
PMV BLD AUTO: 8.8 FL (ref 6–12)
PMV BLD AUTO: 8.9 FL (ref 6–12)
POTASSIUM SERPL-SCNC: 4.2 MMOL/L (ref 3.5–5.2)
PROT SERPL-MCNC: 6.8 G/DL (ref 6–8.5)
PROT UR-MCNC: 10 MG/DL
PROT/CREAT UR: 155.3 MG/G CREA (ref 0–200)
RBC # BLD AUTO: 3.59 10*6/MM3 (ref 3.77–5.28)
RBC # BLD AUTO: 4.43 10*6/MM3 (ref 3.77–5.28)
RH BLD: NEGATIVE
SARS-COV-2 RNA RESP QL NAA+PROBE: NOT DETECTED
SODIUM SERPL-SCNC: 134 MMOL/L (ref 136–145)
T&S EXPIRATION DATE: NORMAL
WBC # BLD AUTO: 12.69 10*3/MM3 (ref 3.4–10.8)
WBC # BLD AUTO: 20.18 10*3/MM3 (ref 3.4–10.8)

## 2021-04-18 PROCEDURE — 25010000002 PHENYLEPHRINE PER 1 ML: Performed by: ANESTHESIOLOGY

## 2021-04-18 PROCEDURE — 80053 COMPREHEN METABOLIC PANEL: CPT | Performed by: OBSTETRICS & GYNECOLOGY

## 2021-04-18 PROCEDURE — 82570 ASSAY OF URINE CREATININE: CPT | Performed by: OBSTETRICS & GYNECOLOGY

## 2021-04-18 PROCEDURE — S0260 H&P FOR SURGERY: HCPCS | Performed by: OBSTETRICS & GYNECOLOGY

## 2021-04-18 PROCEDURE — 88307 TISSUE EXAM BY PATHOLOGIST: CPT

## 2021-04-18 PROCEDURE — 59510 CESAREAN DELIVERY: CPT | Performed by: OBSTETRICS & GYNECOLOGY

## 2021-04-18 PROCEDURE — 86900 BLOOD TYPING SEROLOGIC ABO: CPT | Performed by: OBSTETRICS & GYNECOLOGY

## 2021-04-18 PROCEDURE — 25010000002 ONDANSETRON PER 1 MG: Performed by: OBSTETRICS & GYNECOLOGY

## 2021-04-18 PROCEDURE — 85025 COMPLETE CBC W/AUTO DIFF WBC: CPT | Performed by: OBSTETRICS & GYNECOLOGY

## 2021-04-18 PROCEDURE — U0003 INFECTIOUS AGENT DETECTION BY NUCLEIC ACID (DNA OR RNA); SEVERE ACUTE RESPIRATORY SYNDROME CORONAVIRUS 2 (SARS-COV-2) (CORONAVIRUS DISEASE [COVID-19]), AMPLIFIED PROBE TECHNIQUE, MAKING USE OF HIGH THROUGHPUT TECHNOLOGIES AS DESCRIBED BY CMS-2020-01-R: HCPCS | Performed by: OBSTETRICS & GYNECOLOGY

## 2021-04-18 PROCEDURE — 86850 RBC ANTIBODY SCREEN: CPT | Performed by: OBSTETRICS & GYNECOLOGY

## 2021-04-18 PROCEDURE — 25010000002 MORPHINE PER 10 MG: Performed by: ANESTHESIOLOGY

## 2021-04-18 PROCEDURE — 25010000002 CEFAZOLIN PER 500 MG: Performed by: OBSTETRICS & GYNECOLOGY

## 2021-04-18 PROCEDURE — 85027 COMPLETE CBC AUTOMATED: CPT | Performed by: OBSTETRICS & GYNECOLOGY

## 2021-04-18 PROCEDURE — 84156 ASSAY OF PROTEIN URINE: CPT | Performed by: OBSTETRICS & GYNECOLOGY

## 2021-04-18 PROCEDURE — 86901 BLOOD TYPING SEROLOGIC RH(D): CPT | Performed by: OBSTETRICS & GYNECOLOGY

## 2021-04-18 PROCEDURE — 25010000002 KETOROLAC TROMETHAMINE PER 15 MG: Performed by: ANESTHESIOLOGY

## 2021-04-18 RX ORDER — PHYTONADIONE 1 MG/.5ML
INJECTION, EMULSION INTRAMUSCULAR; INTRAVENOUS; SUBCUTANEOUS
Status: DISPENSED
Start: 2021-04-18 | End: 2021-04-19

## 2021-04-18 RX ORDER — HYDROMORPHONE HYDROCHLORIDE 1 MG/ML
0.5 INJECTION, SOLUTION INTRAMUSCULAR; INTRAVENOUS; SUBCUTANEOUS
Status: DISCONTINUED | OUTPATIENT
Start: 2021-04-18 | End: 2021-04-20 | Stop reason: HOSPADM

## 2021-04-18 RX ORDER — MISOPROSTOL 200 UG/1
800 TABLET ORAL ONCE
Status: COMPLETED | OUTPATIENT
Start: 2021-04-18 | End: 2021-04-18

## 2021-04-18 RX ORDER — ESMOLOL HYDROCHLORIDE 10 MG/ML
INJECTION INTRAVENOUS AS NEEDED
Status: DISCONTINUED | OUTPATIENT
Start: 2021-04-18 | End: 2021-04-18 | Stop reason: SURG

## 2021-04-18 RX ORDER — SODIUM CHLORIDE 0.9 % (FLUSH) 0.9 %
3 SYRINGE (ML) INJECTION EVERY 12 HOURS SCHEDULED
Status: DISCONTINUED | OUTPATIENT
Start: 2021-04-18 | End: 2021-04-18 | Stop reason: HOSPADM

## 2021-04-18 RX ORDER — LIDOCAINE HYDROCHLORIDE 10 MG/ML
5 INJECTION, SOLUTION EPIDURAL; INFILTRATION; INTRACAUDAL; PERINEURAL AS NEEDED
Status: DISCONTINUED | OUTPATIENT
Start: 2021-04-18 | End: 2021-04-18

## 2021-04-18 RX ORDER — DIPHENHYDRAMINE HCL 25 MG
25 CAPSULE ORAL EVERY 4 HOURS PRN
Status: DISCONTINUED | OUTPATIENT
Start: 2021-04-18 | End: 2021-04-20 | Stop reason: HOSPADM

## 2021-04-18 RX ORDER — METHYLERGONOVINE MALEATE 0.2 MG/ML
200 INJECTION INTRAVENOUS AS NEEDED
Status: DISCONTINUED | OUTPATIENT
Start: 2021-04-18 | End: 2021-04-18 | Stop reason: HOSPADM

## 2021-04-18 RX ORDER — CEFAZOLIN SODIUM IN 0.9 % NACL 3 G/100 ML
3 INTRAVENOUS SOLUTION, PIGGYBACK (ML) INTRAVENOUS ONCE
Status: COMPLETED | OUTPATIENT
Start: 2021-04-18 | End: 2021-04-18

## 2021-04-18 RX ORDER — MORPHINE SULFATE 2 MG/ML
2 INJECTION, SOLUTION INTRAMUSCULAR; INTRAVENOUS ONCE
Status: DISCONTINUED | OUTPATIENT
Start: 2021-04-18 | End: 2021-04-20 | Stop reason: HOSPADM

## 2021-04-18 RX ORDER — PRENATAL VIT/IRON FUM/FOLIC AC 27MG-0.8MG
1 TABLET ORAL DAILY
Status: DISCONTINUED | OUTPATIENT
Start: 2021-04-18 | End: 2021-04-20 | Stop reason: HOSPADM

## 2021-04-18 RX ORDER — MORPHINE SULFATE 2 MG/ML
2 INJECTION, SOLUTION INTRAMUSCULAR; INTRAVENOUS
Status: DISCONTINUED | OUTPATIENT
Start: 2021-04-18 | End: 2021-04-18

## 2021-04-18 RX ORDER — KETOROLAC TROMETHAMINE 30 MG/ML
INJECTION, SOLUTION INTRAMUSCULAR; INTRAVENOUS AS NEEDED
Status: DISCONTINUED | OUTPATIENT
Start: 2021-04-18 | End: 2021-04-18 | Stop reason: SURG

## 2021-04-18 RX ORDER — CARBOPROST TROMETHAMINE 250 UG/ML
250 INJECTION, SOLUTION INTRAMUSCULAR AS NEEDED
Status: DISCONTINUED | OUTPATIENT
Start: 2021-04-18 | End: 2021-04-18 | Stop reason: HOSPADM

## 2021-04-18 RX ORDER — ONDANSETRON 2 MG/ML
4 INJECTION INTRAMUSCULAR; INTRAVENOUS EVERY 6 HOURS PRN
Status: DISCONTINUED | OUTPATIENT
Start: 2021-04-18 | End: 2021-04-20 | Stop reason: HOSPADM

## 2021-04-18 RX ORDER — ONDANSETRON 4 MG/1
4 TABLET, FILM COATED ORAL EVERY 8 HOURS PRN
Status: DISCONTINUED | OUTPATIENT
Start: 2021-04-18 | End: 2021-04-20 | Stop reason: HOSPADM

## 2021-04-18 RX ORDER — ONDANSETRON 2 MG/ML
4 INJECTION INTRAMUSCULAR; INTRAVENOUS ONCE AS NEEDED
Status: ACTIVE | OUTPATIENT
Start: 2021-04-18 | End: 2021-04-19

## 2021-04-18 RX ORDER — LIDOCAINE HYDROCHLORIDE 10 MG/ML
5 INJECTION, SOLUTION EPIDURAL; INFILTRATION; INTRACAUDAL; PERINEURAL AS NEEDED
Status: DISCONTINUED | OUTPATIENT
Start: 2021-04-18 | End: 2021-04-18 | Stop reason: HOSPADM

## 2021-04-18 RX ORDER — HYDROCORTISONE 25 MG/G
CREAM TOPICAL 3 TIMES DAILY PRN
Status: DISCONTINUED | OUTPATIENT
Start: 2021-04-18 | End: 2021-04-20 | Stop reason: HOSPADM

## 2021-04-18 RX ORDER — OXYTOCIN-SODIUM CHLORIDE 0.9% IV SOLN 30 UNIT/500ML 30-0.9/5 UT/ML-%
125 SOLUTION INTRAVENOUS CONTINUOUS PRN
Status: COMPLETED | OUTPATIENT
Start: 2021-04-18 | End: 2021-04-18

## 2021-04-18 RX ORDER — ONDANSETRON 2 MG/ML
4 INJECTION INTRAMUSCULAR; INTRAVENOUS ONCE
Status: DISCONTINUED | OUTPATIENT
Start: 2021-04-18 | End: 2021-04-18

## 2021-04-18 RX ORDER — ONDANSETRON 2 MG/ML
4 INJECTION INTRAMUSCULAR; INTRAVENOUS EVERY 6 HOURS PRN
Status: DISCONTINUED | OUTPATIENT
Start: 2021-04-18 | End: 2021-04-18 | Stop reason: HOSPADM

## 2021-04-18 RX ORDER — SODIUM CHLORIDE 0.9 % (FLUSH) 0.9 %
10 SYRINGE (ML) INJECTION AS NEEDED
Status: DISCONTINUED | OUTPATIENT
Start: 2021-04-18 | End: 2021-04-18 | Stop reason: HOSPADM

## 2021-04-18 RX ORDER — AMOXICILLIN 250 MG
1 CAPSULE ORAL 2 TIMES DAILY
Status: DISCONTINUED | OUTPATIENT
Start: 2021-04-18 | End: 2021-04-20 | Stop reason: HOSPADM

## 2021-04-18 RX ORDER — DIPHENHYDRAMINE HYDROCHLORIDE 50 MG/ML
25 INJECTION INTRAMUSCULAR; INTRAVENOUS EVERY 4 HOURS PRN
Status: DISCONTINUED | OUTPATIENT
Start: 2021-04-18 | End: 2021-04-20 | Stop reason: HOSPADM

## 2021-04-18 RX ORDER — NALOXONE HCL 0.4 MG/ML
0.2 VIAL (ML) INJECTION
Status: DISCONTINUED | OUTPATIENT
Start: 2021-04-18 | End: 2021-04-20 | Stop reason: HOSPADM

## 2021-04-18 RX ORDER — ONDANSETRON 4 MG/1
4 TABLET, FILM COATED ORAL EVERY 6 HOURS PRN
Status: DISCONTINUED | OUTPATIENT
Start: 2021-04-18 | End: 2021-04-18 | Stop reason: HOSPADM

## 2021-04-18 RX ORDER — ACETAMINOPHEN 500 MG
1000 TABLET ORAL ONCE
Status: COMPLETED | OUTPATIENT
Start: 2021-04-18 | End: 2021-04-18

## 2021-04-18 RX ORDER — OXYTOCIN-SODIUM CHLORIDE 0.9% IV SOLN 30 UNIT/500ML 30-0.9/5 UT/ML-%
999 SOLUTION INTRAVENOUS ONCE
Status: COMPLETED | OUTPATIENT
Start: 2021-04-18 | End: 2021-04-18

## 2021-04-18 RX ORDER — LANOLIN
CREAM (ML) TOPICAL
Status: DISCONTINUED | OUTPATIENT
Start: 2021-04-18 | End: 2021-04-20 | Stop reason: HOSPADM

## 2021-04-18 RX ORDER — MORPHINE SULFATE 1 MG/ML
INJECTION, SOLUTION EPIDURAL; INTRATHECAL; INTRAVENOUS AS NEEDED
Status: DISCONTINUED | OUTPATIENT
Start: 2021-04-18 | End: 2021-04-18 | Stop reason: SURG

## 2021-04-18 RX ORDER — OXYTOCIN-SODIUM CHLORIDE 0.9% IV SOLN 30 UNIT/500ML 30-0.9/5 UT/ML-%
250 SOLUTION INTRAVENOUS CONTINUOUS PRN
Status: ACTIVE | OUTPATIENT
Start: 2021-04-18 | End: 2021-04-18

## 2021-04-18 RX ORDER — SODIUM CHLORIDE 0.9 % (FLUSH) 0.9 %
3 SYRINGE (ML) INJECTION EVERY 12 HOURS SCHEDULED
Status: DISCONTINUED | OUTPATIENT
Start: 2021-04-18 | End: 2021-04-18

## 2021-04-18 RX ORDER — SIMETHICONE 80 MG
80 TABLET,CHEWABLE ORAL 4 TIMES DAILY PRN
Status: DISCONTINUED | OUTPATIENT
Start: 2021-04-18 | End: 2021-04-20 | Stop reason: HOSPADM

## 2021-04-18 RX ORDER — BUPIVACAINE HYDROCHLORIDE 7.5 MG/ML
INJECTION, SOLUTION EPIDURAL; RETROBULBAR AS NEEDED
Status: DISCONTINUED | OUTPATIENT
Start: 2021-04-18 | End: 2021-04-18 | Stop reason: SURG

## 2021-04-18 RX ORDER — OXYCODONE HYDROCHLORIDE AND ACETAMINOPHEN 5; 325 MG/1; MG/1
2 TABLET ORAL EVERY 4 HOURS PRN
Status: DISCONTINUED | OUTPATIENT
Start: 2021-04-18 | End: 2021-04-20 | Stop reason: HOSPADM

## 2021-04-18 RX ORDER — FAMOTIDINE 10 MG/ML
20 INJECTION, SOLUTION INTRAVENOUS ONCE
Status: COMPLETED | OUTPATIENT
Start: 2021-04-18 | End: 2021-04-18

## 2021-04-18 RX ORDER — SODIUM CHLORIDE 0.9 % (FLUSH) 0.9 %
10 SYRINGE (ML) INJECTION AS NEEDED
Status: DISCONTINUED | OUTPATIENT
Start: 2021-04-18 | End: 2021-04-18

## 2021-04-18 RX ORDER — SODIUM CHLORIDE, SODIUM LACTATE, POTASSIUM CHLORIDE, CALCIUM CHLORIDE 600; 310; 30; 20 MG/100ML; MG/100ML; MG/100ML; MG/100ML
125 INJECTION, SOLUTION INTRAVENOUS CONTINUOUS
Status: DISCONTINUED | OUTPATIENT
Start: 2021-04-18 | End: 2021-04-18

## 2021-04-18 RX ORDER — IBUPROFEN 600 MG/1
600 TABLET ORAL EVERY 6 HOURS PRN
Status: DISCONTINUED | OUTPATIENT
Start: 2021-04-18 | End: 2021-04-20 | Stop reason: HOSPADM

## 2021-04-18 RX ORDER — ERYTHROMYCIN 5 MG/G
OINTMENT OPHTHALMIC
Status: DISPENSED
Start: 2021-04-18 | End: 2021-04-19

## 2021-04-18 RX ADMIN — CEFAZOLIN 3 G: 10 INJECTION, POWDER, FOR SOLUTION INTRAVENOUS at 12:48

## 2021-04-18 RX ADMIN — PHENYLEPHRINE HYDROCHLORIDE 100 MCG: 10 INJECTION INTRAVENOUS at 13:45

## 2021-04-18 RX ADMIN — BUPIVACAINE HYDROCHLORIDE 1.8 ML: 7.5 INJECTION, SOLUTION EPIDURAL; RETROBULBAR at 13:38

## 2021-04-18 RX ADMIN — SIMETHICONE 80 MG: 80 TABLET, CHEWABLE ORAL at 21:51

## 2021-04-18 RX ADMIN — SODIUM CHLORIDE, POTASSIUM CHLORIDE, SODIUM LACTATE AND CALCIUM CHLORIDE 125 ML/HR: 600; 310; 30; 20 INJECTION, SOLUTION INTRAVENOUS at 12:50

## 2021-04-18 RX ADMIN — ONDANSETRON 4 MG: 2 INJECTION INTRAMUSCULAR; INTRAVENOUS at 12:13

## 2021-04-18 RX ADMIN — PHENYLEPHRINE HYDROCHLORIDE 100 MCG: 10 INJECTION INTRAVENOUS at 13:35

## 2021-04-18 RX ADMIN — ESMOLOL HYDROCHLORIDE 20 MG: 10 INJECTION, SOLUTION INTRAVENOUS at 13:50

## 2021-04-18 RX ADMIN — KETOROLAC TROMETHAMINE 30 MG: 30 INJECTION, SOLUTION INTRAMUSCULAR; INTRAVENOUS at 14:07

## 2021-04-18 RX ADMIN — OXYTOCIN 125 ML/HR: 10 INJECTION INTRAVENOUS at 15:33

## 2021-04-18 RX ADMIN — FAMOTIDINE 20 MG: 10 INJECTION INTRAVENOUS at 12:13

## 2021-04-18 RX ADMIN — SODIUM CHLORIDE, POTASSIUM CHLORIDE, SODIUM LACTATE AND CALCIUM CHLORIDE 1000 ML: 600; 310; 30; 20 INJECTION, SOLUTION INTRAVENOUS at 12:13

## 2021-04-18 RX ADMIN — ACETAMINOPHEN 1000 MG: 500 TABLET ORAL at 12:13

## 2021-04-18 RX ADMIN — OXYTOCIN 999 ML/HR: 10 INJECTION INTRAVENOUS at 14:01

## 2021-04-18 RX ADMIN — MORPHINE SULFATE 2 MG: 2 INJECTION, SOLUTION INTRAMUSCULAR; INTRAVENOUS at 18:03

## 2021-04-18 RX ADMIN — MORPHINE SULFATE 0.2 MG: 1 INJECTION, SOLUTION EPIDURAL; INTRATHECAL; INTRAVENOUS at 13:38

## 2021-04-18 RX ADMIN — IBUPROFEN 600 MG: 600 TABLET ORAL at 21:51

## 2021-04-18 RX ADMIN — SODIUM CHLORIDE, POTASSIUM CHLORIDE, SODIUM LACTATE AND CALCIUM CHLORIDE 999 ML/HR: 600; 310; 30; 20 INJECTION, SOLUTION INTRAVENOUS at 15:48

## 2021-04-18 RX ADMIN — MISOPROSTOL 800 MCG: 200 TABLET ORAL at 17:05

## 2021-04-18 NOTE — PLAN OF CARE
Problem: Adult Inpatient Plan of Care  Goal: Plan of Care Review  Outcome: Ongoing, Progressing  Flowsheets (Taken 2021)  Progress: improving  Plan of Care Reviewed With:   patient   spouse  Outcome Summary:  at 38+3wks delivered via primary c/s d/t breech presentataion and GHTN. Apgars 8/9. Infant stayed in KELLY for 2 hour. Bottle fed for 10mL. QBL total with procedure and post op vag bleeding = 1046mL. 800mcg rectal cytotec given per Dr. Bruce MD aware of total QBL and came bedside to assess bleeding. Fundus firm with massage, midline, scant to light amt of bleeding at the umbilicus. Per MD pt may be transferred to postpartum at this time.  Goal: Patient-Specific Goal (Individualized)  Outcome: Ongoing, Progressing  Flowsheets (Taken 2021)  Patient-Specific Goals (Include Timeframe): Adequate pain control before discharge  Individualized Care Needs: KELLY, pumping  Anxieties, Fears or Concerns: 1st c/s  Goal: Absence of Hospital-Acquired Illness or Injury  Outcome: Ongoing, Progressing  Intervention: Prevent and Manage VTE (venous thromboembolism) Risk  Recent Flowsheet Documentation  Taken 2021 1746 by Yany Hernandez RN  VTE Prevention/Management:   sequential compression devices on   bilateral  Taken 2021 1717 by Yany Hernandez RN  VTE Prevention/Management: sequential compression devices on  Taken 2021 1701 by Yany Hernandez RN  VTE Prevention/Management:   sequential compression devices on   bilateral  Taken 2021 1646 by Yany Hernandez RN  VTE Prevention/Management:   bilateral   sequential compression devices on  Taken 2021 1631 by Yany Hernandez RN  VTE Prevention/Management:   bilateral   sequential compression devices on  Taken 2021 1616 by Yany Hernandez RN  VTE Prevention/Management:   bilateral   sequential compression devices on  Taken 2021 1546 by Yany Hernandez RN  VTE Prevention/Management:   bilateral   sequential compression devices  on  Taken 2021 1531 by Yany Hernandez RN  VTE Prevention/Management:   bilateral   sequential compression devices on  Taken 2021 1520 by Yany Hernandez RN  VTE Prevention/Management:   bilateral   sequential compression devices on  Taken 2021 1200 by Yany Hernandez RN  VTE Prevention/Management: (will place on in OR)   sequential compression devices off   bilateral  Goal: Optimal Comfort and Wellbeing  Outcome: Ongoing, Progressing  Intervention: Provide Person-Centered Care  Recent Flowsheet Documentation  Taken 2021 1646 by Yany Hernandez RN  Trust Relationship/Rapport:   care explained   choices provided   emotional support provided   empathic listening provided   questions answered   questions encouraged   reassurance provided   thoughts/feelings acknowledged  Taken 2021 1151 by Yany Hernandez RN  Trust Relationship/Rapport:   care explained   choices provided   emotional support provided   questions answered   empathic listening provided   questions encouraged   reassurance provided   thoughts/feelings acknowledged  Goal: Readiness for Transition of Care  Outcome: Ongoing, Progressing     Problem:  Fall Injury Risk  Goal: Absence of Fall, Infant Drop and Related Injury  Outcome: Ongoing, Progressing     Problem: Skin Injury Risk Increased  Goal: Skin Health and Integrity  Outcome: Ongoing, Progressing     Problem: Device-Related Complication Risk (Anesthesia/Analgesia, Neuraxial)  Goal: Safe Infusion Delivery Completion  Outcome: Ongoing, Progressing     Problem: Infection (Anesthesia/Analgesia, Neuraxial)  Goal: Absence of Infection Signs and Symptoms  Outcome: Ongoing, Progressing     Problem: Nausea and Vomiting (Anesthesia/Analgesia, Neuraxial)  Goal: Nausea and Vomiting Relief  Outcome: Ongoing, Progressing     Problem: Pain (Anesthesia/Analgesia, Neuraxial)  Goal: Effective Pain Control  Outcome: Ongoing, Progressing  Intervention: Prevent or Manage Pain  Recent  Flowsheet Documentation  Taken 2021 1717 by Yany Hernandez, RN  Pain Management Interventions: medication offered but refused  Taken 2021 1701 by Yany Hernandez, RN  Pain Management Interventions: medication offered but refused  Taken 2021 1631 by Yany Hernandez, RN  Pain Management Interventions: medication offered but refused     Problem: Respiratory Compromise (Anesthesia/Analgesia, Neuraxial)  Goal: Effective Oxygenation and Ventilation  Outcome: Ongoing, Progressing     Problem: Sensorimotor Impairment (Anesthesia/Analgesia, Neuraxial)  Goal: Baseline Motor Function  Outcome: Ongoing, Progressing     Problem: Urinary Retention (Anesthesia/Analgesia, Neuraxial)  Goal: Effective Urinary Elimination  Outcome: Ongoing, Progressing     Problem: Hypertensive Disorders in Pregnancy  Goal: Maternal-Fetal Stabilization  Outcome: Ongoing, Progressing     Problem: Adjustment to Role Transition (Postpartum  Delivery)  Goal: Successful Maternal Role Transition  Outcome: Ongoing, Progressing     Problem: Bleeding (Postpartum  Delivery)  Goal: Hemostasis  Outcome: Ongoing, Progressing     Problem: Infection (Postpartum  Delivery)  Goal: Absence of Infection Signs and Symptoms  Outcome: Ongoing, Progressing     Problem: Pain (Postpartum  Delivery)  Goal: Acceptable Pain Control  Outcome: Ongoing, Progressing  Intervention: Prevent or Manage Pain  Recent Flowsheet Documentation  Taken 2021 1717 by Yany Hernandez, RN  Pain Management Interventions: medication offered but refused  Taken 2021 1701 by Yany Hernandez, RN  Pain Management Interventions: medication offered but refused  Taken 2021 1631 by Yany Hernandez, RN  Pain Management Interventions: medication offered but refused     Problem: Postoperative Nausea and Vomiting (Postpartum  Delivery)  Goal: Nausea and Vomiting Relief  Outcome: Ongoing, Progressing     Problem: Postoperative Urinary Retention  (Postpartum  Delivery)  Goal: Effective Urinary Elimination  Outcome: Ongoing, Progressing   Goal Outcome Evaluation:  Plan of Care Reviewed With: patient, spouse  Progress: improving  Outcome Summary:  at 38+3wks delivered via primary c/s d/t breech presentataion and GHTN. Apgars 8/9. Infant stayed in KELLY for 2 hour. Bottle fed for 10mL. QBL total with procedure and post op vag bleeding = 1046mL. 800mcg rectal cytotec given per Dr. Bruce MD aware of total QBL and came bedside to assess bleeding. Fundus firm with massage, midline, scant to light amt of bleeding at the umbilicus. Per MD pt may be transferred to postpartum at this time.

## 2021-04-18 NOTE — ANESTHESIA PREPROCEDURE EVALUATION
Anesthesia Evaluation     NPO Solid Status: > 6 hours             Airway   Mallampati: III  TM distance: >3 FB  Neck ROM: full  Dental      Pulmonary - normal exam   (+) sleep apnea,   (-) not a smoker  Cardiovascular - normal exam        Neuro/Psych  (+) psychiatric history Anxiety,     GI/Hepatic/Renal/Endo    (+) morbid obesity,      Musculoskeletal     Abdominal    Substance History      OB/GYN    (+) Pregnant (38W 3D Breech BiCornuate Uterus loyd), pregnancy induced hypertension        Other                        Anesthesia Plan    ASA 3     spinal   (Discussed dura morp)    Anesthetic plan, all risks, benefits, and alternatives have been provided, discussed and informed consent has been obtained with: patient.

## 2021-04-18 NOTE — NURSING NOTE
No relief count done with Lashaun Daily C. Simkoff d/t emergency on unit requiring OBT. Counts were correct at second count and final count.

## 2021-04-18 NOTE — H&P
[facility]  Obstetric History and Physical    Chief Complaint   Patient presents with   • Non-stress Test     Patient here for scheduled NST.  Has GHTN, and possibly delivery today if remain elevatged.  Good FM, no LOF, no VB, occasional contraction.       Subjective     Patient is a 26 y.o. female  currently at 38w3d, who presents for follow-up NST after being here 3 days ago with elevated blood pressures. While pressures look better today. Patient reports a headache with elevated blood pressures (Systolics in 140's) at home. On cervical exam patient is also 3-4 cm dilated with BBOW and breech presentation.  Discussed options and recommendations with patient and  and all agree to proceed with primary  section today.       Her prenatal care is complicated by  hypertension  gestational hypertension and malpresentation  breech.  Her previous obstetric/gynecological history is noted for is non-contributory.    The following portions of the patients history were reviewed and updated as appropriate: current medications, allergies, past medical history, past surgical history, past family history, past social history and problem list .       Prenatal Information:  Prenatal Results     POC Urine Glucose/Protein     Test Value Reference Range Date Time    Urine Glucose  Negative mg/dL Negative, 1000 mg/dL (3+) 21 1520    Urine Protein  Negative mg/dL Negative 21 1520          Initial Prenatal Labs     Test Value Reference Range Date Time    Hemoglobin  13.8 g/dL 11.1 - 15.9 20 1546    Hematocrit  42.2 % 34.0 - 46.6 20 1546    Platelets  265 10*3/mm3 140 - 450 04/15/21 1442       258 x10E3/uL 150 - 450 20 1546    Rubella IgG  2.78 index Immune >0.99 20 1546    Hepatitis B SAg  Negative  Negative 20 1546    Hepatitis C Ab  <0.1 s/co ratio 0.0 - 0.9 20 1546    RPR  Non Reactive  Non Reactive 20 1546    ABO  A   20 1546    Rh  Negative    09/08/20 1546    Antibody Screen  Positive  Negative 09/08/20 1546       See Final Results  Negative 09/08/20 1546    HIV  Non Reactive  Non Reactive 09/08/20 1546    Urine Culture  50,000 CFU/mL Mixed Breanna Isolated   04/15/21 1414       <25,000 CFU/mL Normal Urogenital Breanna   04/08/21 1200       25,000 CFU/mL Mixed Breanna Isolated   02/08/21 1006       Final report   09/08/20 1546    Gonorrhea  Negative  Negative 01/08/21 1228       Negative  Negative 09/08/20 1558    Chlamydia  Negative  Negative 01/08/21 1228       Negative  Negative 09/08/20 1558    TSH  1.810 uIU/mL 0.450 - 4.500 07/01/20 0945          2nd and 3rd Trimester     Test Value Reference Range Date Time    Hemoglobin (repeated)  12.0 g/dL 12.0 - 15.9 04/15/21 1442       12.5 g/dL 12.0 - 15.9 01/26/21 1036    Hematocrit (repeated)  36.0 % 34.0 - 46.6 04/15/21 1442       37.3 % 34.0 - 46.6 01/26/21 1036    GCT  102 mg/dL 65 - 139 01/26/21 1036    Antibody Screen (repeated)  Negative  Negative 01/26/21 1036    GTT Fasting        GTT 1 Hr        GTT 2 Hr        GTT 3 Hr        Group B Strep  Negative  Negative 03/22/21 1541          Drug Screening     Test Value Reference Range Date Time    Amphetamine Screen        Barbiturate Screen        Benzodiazepine Screen        Methadone Screen        Phencyclidine Screen        Opiates Screen        THC Screen        Cocaine Screen        Propoxyphene Screen        Buprenorphine Screen        Methamphetamine Screen        Oxycodone Screen        Tricyclic Antidepressants Screen              Other (Risk screening)     Test Value Reference Range Date Time    Varicella IgG  >4000 index Immune >165 09/08/20 1546    Parvovirus IgG        CMV IgG        Cystic Fibrosis        Hemoglobin electrophoresis        NIPT        MSAFP-4        AFP (for NTD only)  *Screen Negative*   12/03/20 0920          Legend    ^: Historical                      External Prenatal Results     Pregnancy Outside Results - Transcribed From  Office Records - See Scanned Records For Details     Test Value Date Time    Hgb  12.0 g/dL 04/15/21 1442       12.5 g/dL 21 1036       13.8 g/dL 20 1546    Hct  36.0 % 04/15/21 1442       37.3 % 21 1036       42.2 % 20 1546    ABO  A  20 1546    Rh  Negative  20 1546    Antibody Screen  Negative  21 1036       Positive  20 1546       See Final Results  20 1546    Glucose Fasting GTT       Glucose Tolerance Test 1 hour       Glucose Tolerance Test 3 hour       Gonorrhea (discrete)  Negative  21 1228       Negative  20 1558    Chlamydia (discrete)  Negative  21 1228       Negative  20 1558    RPR  Non Reactive  20 1546    VDRL       Syphilis Antibody       Rubella  2.78 index 20 1546    HBsAg  Negative  20 1546    Herpes Simplex Virus PCR       Herpes Simplex VIrus Culture       HIV  Non Reactive  20 1546    Hep C RNA Quant PCR       Hep C Antibody  <0.1 s/co ratio 20 1546    AFP  28.9 ng/mL 20 0920    Group B Strep  Negative  21 1541    GBS Susceptibility to Clindamycin       GBS Susceptibility to Erythromycin       Fetal Fibronectin       Genetic Testing, Maternal Blood             Drug Screening     Test Value Date Time    Urine Drug Screen       Amphetamine Screen       Barbiturate Screen       Benzodiazepine Screen       Methadone Screen       Phencyclidine Screen       Opiates Screen       THC Screen       Cocaine Screen       Propoxyphene Screen       Buprenorphine Screen       Methamphetamine Screen       Oxycodone Screen       Tricyclic Antidepressants Screen             Legend    ^: Historical                         Past OB History:     OB History    Para Term  AB Living   2 0 0 0 1 0   SAB TAB Ectopic Molar Multiple Live Births   1 0 0 0 0 0      # Outcome Date GA Lbr Celestine/2nd Weight Sex Delivery Anes PTL Lv   2 Current            1 SAB 20 7w3d             Obstetric  Comments   9/8/20 - IUP at 6-5 weeks - [facility] 4/29/21 - F    12/3/20 - 19-0 weeks - Normal CL and normal but INCOMPLETE anatomy - [facility]    1/26/21 - 26-5 weeks EFW 58%, AC 77% Normal completed anatomy - [facility]      3/1/21 - 31-4 weeks - AC 43%, AC 49% -[facility]    3/30/21- 35-5 weeks - EFW 74%, AC 96% -[facility]        Past Medical History: Past Medical History:   Diagnosis Date   • Abnormal Pap smear of cervix    • ADHD (attention deficit hyperactivity disorder)     never medicated   • Anxiety     not medicated during pregnancy   • Depression     not medicated during pregnancy   • HPV in female    • Miscarriage    • Sleep apnea     DOESN'T USE MACHINE... LOST 126LBS. JORGE LUIS. FOR NEW SLEEP STUDY      Past Surgical History Past Surgical History:   Procedure Laterality Date   • ADENOIDECTOMY     • CHOLECYSTECTOMY     • D & C WITH SUCTION N/A 6/11/2020    Procedure: DILATATION AND CURETTAGE WITH SUCTION;  Surgeon: Yadi Barrera MD;  Location: [facility];  Service: Obstetrics/Gynecology;  Laterality: N/A;   • EAR TUBES     • LAPAROSCOPIC CHOLECYSTECTOMY     • TONSILLECTOMY     • WISDOM TOOTH EXTRACTION        Family History: Family History   Problem Relation Age of Onset   • Thyroid disease Mother    • Depression Mother    • Hypertension Father    • Hyperlipidemia Father    • Cancer Maternal Grandmother    • Pancreatic cancer Maternal Grandmother    • Pancreatitis Maternal Grandmother    • No Known Problems Brother    • No Known Problems Sister    • Ovarian cancer Other    • Malig Hyperthermia Neg Hx    • Breast cancer Neg Hx    • Uterine cancer Neg Hx    • Colon cancer Neg Hx       Social History:  reports that she has never smoked. She has never used smokeless tobacco.   reports no history of alcohol use.   reports no history of drug use.        Review of Systems   Constitutional: Negative for chills, fatigue and fever.   Gastrointestinal: Negative for abdominal distention and abdominal pain.   Genitourinary: Negative for  dysuria, pelvic pain, vaginal bleeding, vaginal discharge and vaginal pain.   Neurological: Negative for headaches.   All other systems reviewed and are negative.        Objective     Vital Signs Range for the last 24 hours  Temperature: Temp:  [98.4 °F (36.9 °C)] 98.4 °F (36.9 °C)   Temp Source: Temp src: Oral   BP: BP: (121-135)/(70-87) 121/73   Pulse: Heart Rate:  [] 100   Respirations: Resp:  [16] 16   SPO2: SpO2:  [98 %] 98 %   O2 Amount (l/min):     O2 Devices     Weight: Weight:  [141 kg (310 lb 9.6 oz)] 141 kg (310 lb 9.6 oz)     Physical Examination: General appearance - alert, well appearing, and in no distress  Abdomen - gravid, obese soft, nontender, nondistended, no masses or organomegaly  Musculoskeletal - no joint tenderness, deformity or swelling  Extremities - peripheral pulses normal, no pedal edema, no clubbing or cyanosis  Skin - normal coloration and turgor, no rashes, no suspicious skin lesions noted    Presentation: Breech    Cervix: Exam by:     Dilation:     Effacement:     Station:       Fetal Heart Rate Assessment   Method:     Beats/min: Fetal HR (beats/min): 130   Baseline:     Variability: Fetal HR Variability: moderate (amplitude range 6 to 25 bpm)   Accels: Fetal HR Accelerations: greater than/equal to 15 bpm, lasting at least 15 seconds   Decels: Fetal HR Decelerations: absent   Tracing Category:       Uterine Assessment   Method:     Frequency (min):     Ctx Count in 10 min:     Duration:     Intensity:     Intensity by IUPC:     Resting Tone:     Resting Tone by IUPC:     Cecelia Units:       Laboratory Results:   Results from last 7 days   Lab Units 04/15/21  1442   WBC 10*3/mm3 10.47   HEMOGLOBIN g/dL 12.0   HEMATOCRIT % 36.0   PLATELETS 10*3/mm3 265     Results from last 7 days   Lab Units 04/15/21  1442   SODIUM mmol/L 137   POTASSIUM mmol/L 4.4   CHLORIDE mmol/L 106   CO2 mmol/L 21.3*   BUN mg/dL 8   CREATININE mg/dL 0.52*   CALCIUM mg/dL 9.1   BILIRUBIN mg/dL 0.2    ALK PHOS U/L 112   ALT (SGPT) U/L 14   AST (SGOT) U/L 12   GLUCOSE mg/dL 114*       Assessment/Plan       Breech presentation, no version    Bicornuate uterus    Excessive weight gain during pregnancy    Rh negative status during pregnancy    Premature cervical dilation in third trimester    Pregnancy    Breech presentation of fetus    Gestational hypertension without significant proteinuria    Obesity in pregnancy, antepartum      Assessment & Plan    Assessment:  1.  Intrauterine pregnancy at 38w3d gestation with reactive, reassuring fetal status.    2.  malpresentation  breech with advanced cervical dilation   3.  Obstetrical history significant for is non-contributory.  4.  GBS status:   Strep Gp B Culture   Date Value Ref Range Status   2021 Negative Negative Final     Comment:     Centers for Disease Control and Prevention (CDC) and American Congress  of Obstetricians and Gynecologists (ACOG) guidelines for prevention of   group B streptococcal (GBS) disease specify co-collection of  a vaginal and rectal swab specimen to maximize sensitivity of GBS  detection. Per the CDC and ACOG, swabbing both the lower vagina and  rectum substantially increases the yield of detection compared with  sampling the vagina alone.  Penicillin G, ampicillin, or cefazolin are indicated for intrapartum  prophylaxis of  GBS colonization. Reflex susceptibility  testing should be performed prior to use of clindamycin only on GBS  isolates from penicillin-allergic women who are considered a high risk  for anaphylaxis. Treatment with vancomycin without additional testing  is warranted if resistance to clindamycin is noted.     5. Gestational Hypertension     Plan:  1. Primary  scheduled  2. Plan of care has been reviewed with patient and    3.  Risks, benefits of treatment plan have been discussed.  4.  All questions have been answered.        Yadi Barrera MD  2021  11:10 EDT

## 2021-04-18 NOTE — ANESTHESIA PROCEDURE NOTES
Intrathecal Block      Patient reassessed immediately prior to procedure    Patient location during procedure: OR  Performed By  Anesthesiologist: Alton Breaux MD  Preanesthetic Checklist  Completed: patient identified, site marked, surgical consent, pre-op evaluation, timeout performed, IV checked, risks and benefits discussed and monitors and equipment checked  Intrathecal Block Prep:  Pt Position:sitting  Sterile Tech:sterile barrier, gloves and cap  Prep:chloraprep  Monitoring:blood pressure monitoring, continuous pulse oximetry and EKG  Intrathecal Block Procedure:  Approach:midline  Location:L2-L3  Needle Type:Disha  Needle Gauge:25 G  Placement of Needle Event: cerebrospinal fluid  Fluid Appearance:clear  Post Assessment:  Patient Tolerance:patient tolerated the procedure well with no apparent complications

## 2021-04-18 NOTE — ANESTHESIA POSTPROCEDURE EVALUATION
Patient: Davida Quinonez    Procedure Summary     Date: 21 Room / Location:  [facility]    Anesthesia Start: 1331 Anesthesia Stop: 151    Procedure:  SECTION PRIMARY (N/A Abdomen) Diagnosis:       Breech presentation, single or unspecified fetus      (Breech presentation, single or unspecified fetus [O32.1XX0])    Surgeons: Yadi Barrera MD Provider: Alton Breaux MD    Anesthesia Type: spinal ASA Status: 3          Anesthesia Type: spinal    Vitals  Vitals Value Taken Time   /85 21 1646   Temp 36.8 °C (98.3 °F) 21 1520   Pulse 77 21 1646   Resp 18 21 1631   SpO2 100 % 21 1645   Vitals shown include unvalidated device data.        Post Anesthesia Care and Evaluation    Patient location during evaluation: PACU  Anesthetic complications: No anesthetic complications

## 2021-04-18 NOTE — PROGRESS NOTES
I was scrubbed and actively participating as first assistant, including providing exposure, delivery assistance, hemostatic maneuvers and closure for Dr. Barrera.

## 2021-04-19 LAB
BASOPHILS # BLD AUTO: 0.05 10*3/MM3 (ref 0–0.2)
BASOPHILS NFR BLD AUTO: 0.4 % (ref 0–1.5)
DEPRECATED RDW RBC AUTO: 40 FL (ref 37–54)
EOSINOPHIL # BLD AUTO: 0.08 10*3/MM3 (ref 0–0.4)
EOSINOPHIL NFR BLD AUTO: 0.7 % (ref 0.3–6.2)
ERYTHROCYTE [DISTWIDTH] IN BLOOD BY AUTOMATED COUNT: 12.6 % (ref 12.3–15.4)
HCT VFR BLD AUTO: 29.7 % (ref 34–46.6)
HGB BLD-MCNC: 10 G/DL (ref 12–15.9)
IMM GRANULOCYTES # BLD AUTO: 0.09 10*3/MM3 (ref 0–0.05)
IMM GRANULOCYTES NFR BLD AUTO: 0.8 % (ref 0–0.5)
LYMPHOCYTES # BLD AUTO: 2.41 10*3/MM3 (ref 0.7–3.1)
LYMPHOCYTES NFR BLD AUTO: 20.7 % (ref 19.6–45.3)
MCH RBC QN AUTO: 29.7 PG (ref 26.6–33)
MCHC RBC AUTO-ENTMCNC: 33.7 G/DL (ref 31.5–35.7)
MCV RBC AUTO: 88.1 FL (ref 79–97)
MONOCYTES # BLD AUTO: 0.89 10*3/MM3 (ref 0.1–0.9)
MONOCYTES NFR BLD AUTO: 7.6 % (ref 5–12)
NEUTROPHILS NFR BLD AUTO: 69.8 % (ref 42.7–76)
NEUTROPHILS NFR BLD AUTO: 8.13 10*3/MM3 (ref 1.7–7)
NRBC BLD AUTO-RTO: 0 /100 WBC (ref 0–0.2)
PLATELET # BLD AUTO: 200 10*3/MM3 (ref 140–450)
PMV BLD AUTO: 9.1 FL (ref 6–12)
RBC # BLD AUTO: 3.37 10*6/MM3 (ref 3.77–5.28)
WBC # BLD AUTO: 11.65 10*3/MM3 (ref 3.4–10.8)

## 2021-04-19 PROCEDURE — 85025 COMPLETE CBC W/AUTO DIFF WBC: CPT | Performed by: OBSTETRICS & GYNECOLOGY

## 2021-04-19 PROCEDURE — 25010000002 ENOXAPARIN PER 10 MG: Performed by: OBSTETRICS & GYNECOLOGY

## 2021-04-19 RX ADMIN — ENOXAPARIN SODIUM 40 MG: 40 INJECTION SUBCUTANEOUS at 14:29

## 2021-04-19 RX ADMIN — IBUPROFEN 600 MG: 600 TABLET ORAL at 03:49

## 2021-04-19 RX ADMIN — DOCUSATE SODIUM 50MG AND SENNOSIDES 8.6MG 1 TABLET: 8.6; 5 TABLET, FILM COATED ORAL at 09:47

## 2021-04-19 RX ADMIN — DOCUSATE SODIUM 50MG AND SENNOSIDES 8.6MG 1 TABLET: 8.6; 5 TABLET, FILM COATED ORAL at 20:40

## 2021-04-19 RX ADMIN — OXYCODONE HYDROCHLORIDE AND ACETAMINOPHEN 1 TABLET: 5; 325 TABLET ORAL at 15:54

## 2021-04-19 RX ADMIN — OXYCODONE HYDROCHLORIDE AND ACETAMINOPHEN 1 TABLET: 5; 325 TABLET ORAL at 11:30

## 2021-04-19 RX ADMIN — SIMETHICONE 80 MG: 80 TABLET, CHEWABLE ORAL at 09:47

## 2021-04-19 RX ADMIN — IBUPROFEN 600 MG: 600 TABLET ORAL at 12:17

## 2021-04-19 RX ADMIN — SIMETHICONE 80 MG: 80 TABLET, CHEWABLE ORAL at 18:13

## 2021-04-19 RX ADMIN — OXYCODONE HYDROCHLORIDE AND ACETAMINOPHEN 2 TABLET: 5; 325 TABLET ORAL at 07:11

## 2021-04-19 RX ADMIN — IBUPROFEN 600 MG: 600 TABLET ORAL at 18:13

## 2021-04-19 RX ADMIN — OXYCODONE HYDROCHLORIDE AND ACETAMINOPHEN 2 TABLET: 5; 325 TABLET ORAL at 20:36

## 2021-04-19 RX ADMIN — Medication 1 TABLET: at 09:47

## 2021-04-19 RX ADMIN — Medication: at 09:47

## 2021-04-19 NOTE — OP NOTE
[facility]     2021    Patient:Davida Quinonez   MR#:[id number]     Section Procedure Note    Indications: malpresentation: Breech     Pre-operative Diagnosis: Intrauterine pregnancy at 38w3d  2. Gestational Hypertension   3. Breech presentation   4. Advanced cervical dilation     Post-operative Diagnosis: same    Procedure:    Primary low transverse  section     Surgeon: Yadi Barrera MD     Assistants: Prince Baca MD     Anesthesia: Spinal anesthesia    Prenatal care problem list:    Patient Active Problem List   Diagnosis   • Depression   • Bicornuate uterus   • Nausea and vomiting during pregnancy prior to 22 weeks gestation   • Excessive weight gain during pregnancy   • Rh negative status during pregnancy   • Premature cervical dilation in third trimester   • Pregnancy   • Breech presentation of fetus   • Breech presentation, no version   • Gestational hypertension without significant proteinuria   • Obesity in pregnancy, antepartum       Procedure Details   The patient was seen in the LDR preoperatively. The risks, benefits, complications, treatment options, and expected outcomes were discussed with the patient.  The patient concurred with the proposed plan, giving informed consent.  The site of surgery is discussed. The patient was taken to Operating Room # 1, identified as Davida Quinonez and the procedure verified as  Delivery. A Time Out was held and the above information confirmed.    After induction of anesthesia, the patient was draped and prepped in the usual sterile manner. A Pfannenstiel incision was made and carried down through the subcutaneous tissue to the fascia. Fascial incision was made and extended transversely. The fascia was  from the underlying rectus tissue superiorly and inferiorly. The peritoneum was identified and entered. Peritoneal incision was extended longitudinally. The utero-vesical  peritoneal reflection was incised transversely and the bladder flap was bluntly freed from the lower uterine segment. A low transverse uterine incision was made. Delivered from breech presentation was a male  fetus 4080 g (8 lb 15.9 oz)  with Apgar scores of 8   at one minute and 9  at five minutes. After the umbilical cord was clamped and cut cord blood was obtained for evaluation. The placenta was removed intact and appeared normal. The uterine outline, tubes and ovaries appeared normal.  The uterine incision was closed with running locked sutures of 0 Monocryl.  Hemostasis was observed. At this point in the case I had to leave the OR to attend to an emergency in another OR. I left my patient in the care of Dr. Baca as infant was delivered, uterus was closed and patient was stable.     The fascia was then reapproximated with running sutures of 0 Vicryl. The subcutaneous layer was closed and the skin was reapproximated.  BLAS dressing was placed.      Instrument, sponge, and needle counts were correct prior the abdominal closure and at the conclusion of the case.       Findings:  Bicornuate uterus     Estimated Blood Loss:  see nursing flow sheet           Specimens:  Placenta            Complications:  None; patient tolerated the procedure well.           Disposition: PACU - hemodynamically stable.           Condition: stable    Attending Attestation: I was present and scrubbed for the key portions of the procedure.      Yadi Barrera MD  2021 20:12 EDT

## 2021-04-19 NOTE — PLAN OF CARE
Problem: Adult Inpatient Plan of Care  Goal: Plan of Care Review  Outcome: Ongoing, Progressing  Flowsheets  Taken 2021 0528 by Bethanie Green RN  Progress: improving  Outcome Summary:    delivery day 1. VSS   Assessment & fundal checks WNL. BLAS dressing intact, incisional dressing with scant marked old drainage from yesterday. Ambulated to restroom well with assisstance. Bates d/c this AM DTV by 10:30 AM. IV flushed. Managing pain with Motrin. Recieved Cytotec yesterday @ 1705 before arriving to post partum. Lovenox order set to start afternoon today.  Taken 2021 1816 by Yany Hernandez RN  Plan of Care Reviewed With:   patient   spouse  Goal: Patient-Specific Goal (Individualized)  Outcome: Ongoing, Progressing  Goal: Absence of Hospital-Acquired Illness or Injury  Outcome: Ongoing, Progressing  Intervention: Identify and Manage Fall Risk  Recent Flowsheet Documentation  Taken 2021 0300 by Bethanie Geren RN  Safety Promotion/Fall Prevention: safety round/check completed  Taken 2021 0105 by Bethanie Green RN  Safety Promotion/Fall Prevention: safety round/check completed  Taken 2021 2327 by Bethanie Green RN  Safety Promotion/Fall Prevention: safety round/check completed  Taken 2021 215 by Bethanie Green RN  Safety Promotion/Fall Prevention: safety round/check completed  Taken 2021 2030 by Bethanie Green RN  Safety Promotion/Fall Prevention: safety round/check completed  Intervention: Prevent Skin Injury  Recent Flowsheet Documentation  Taken 2021 by Bethanie Green RN  Body Position: sitting up in bed  Intervention: Prevent and Manage VTE (venous thromboembolism) Risk  Recent Flowsheet Documentation  Taken 2021 by Bethanie Green RN  VTE Prevention/Management:   bilateral   sequential compression devices on  Goal: Optimal Comfort and Wellbeing  Outcome: Ongoing, Progressing  Intervention: Provide Person-Centered  Care  Recent Flowsheet Documentation  Taken 2021 by Bethanie Green RN  Trust Relationship/Rapport:   care explained   questions answered   questions encouraged  Goal: Readiness for Transition of Care  Outcome: Ongoing, Progressing     Problem:  Fall Injury Risk  Goal: Absence of Fall, Infant Drop and Related Injury  Outcome: Ongoing, Progressing  Intervention: Identify and Manage Contributors to Fall Injury Risk  Recent Flowsheet Documentation  Taken 2021 by Bethanie Green RN  Medication Review/Management: medications reviewed  Self-Care Promotion: independence encouraged  Intervention: Promote Injury-Free Environment  Recent Flowsheet Documentation  Taken 2021 0300 by Bethanie Green RN  Safety Promotion/Fall Prevention: safety round/check completed  Taken 2021 0105 by Bethanie Green RN  Safety Promotion/Fall Prevention: safety round/check completed  Taken 2021 2327 by Bethanie Green RN  Safety Promotion/Fall Prevention: safety round/check completed  Taken 2021 215 by Bethanie Green RN  Safety Promotion/Fall Prevention: safety round/check completed  Taken 2021 by Bethanie Green RN  Safety Promotion/Fall Prevention: safety round/check completed     Problem: Skin Injury Risk Increased  Goal: Skin Health and Integrity  Outcome: Ongoing, Progressing  Intervention: Optimize Skin Protection  Recent Flowsheet Documentation  Taken 2021 by Bethanie Green RN  Head of Bed (HOB): HOB elevated     Problem: Device-Related Complication Risk (Anesthesia/Analgesia, Neuraxial)  Goal: Safe Infusion Delivery Completion  Outcome: Ongoing, Progressing     Problem: Infection (Anesthesia/Analgesia, Neuraxial)  Goal: Absence of Infection Signs and Symptoms  Outcome: Ongoing, Progressing     Problem: Nausea and Vomiting (Anesthesia/Analgesia, Neuraxial)  Goal: Nausea and Vomiting Relief  Outcome: Ongoing, Progressing     Problem: Pain  (Anesthesia/Analgesia, Neuraxial)  Goal: Effective Pain Control  Outcome: Ongoing, Progressing  Intervention: Prevent or Manage Pain  Recent Flowsheet Documentation  Taken 4/18/2021 2151 by Bethanie Green RN  Pain Management Interventions: see MAR  Taken 4/18/2021 2030 by Bethanie Green RN  Pain Management Interventions: pain management plan reviewed with patient/caregiver     Problem: Respiratory Compromise (Anesthesia/Analgesia, Neuraxial)  Goal: Effective Oxygenation and Ventilation  Outcome: Ongoing, Progressing  Intervention: Optimize Oxygenation and Ventilation  Recent Flowsheet Documentation  Taken 4/18/2021 2030 by Bethanie Green RN  Head of Bed (HOB): HOB elevated     Problem: Sensorimotor Impairment (Anesthesia/Analgesia, Neuraxial)  Goal: Baseline Motor Function  Outcome: Ongoing, Progressing  Intervention: Optimize Sensorimotor Function  Recent Flowsheet Documentation  Taken 4/19/2021 0300 by Bethanie Green RN  Safety Promotion/Fall Prevention: safety round/check completed  Taken 4/19/2021 0105 by Bethanie Green RN  Safety Promotion/Fall Prevention: safety round/check completed  Taken 4/18/2021 2327 by Bethanie Green RN  Safety Promotion/Fall Prevention: safety round/check completed  Taken 4/18/2021 2151 by Bethanie Green RN  Safety Promotion/Fall Prevention: safety round/check completed  Taken 4/18/2021 2030 by Bethanie Green RN  Safety Promotion/Fall Prevention: safety round/check completed     Problem: Urinary Retention (Anesthesia/Analgesia, Neuraxial)  Goal: Effective Urinary Elimination  Outcome: Ongoing, Progressing  Intervention: Promote Effective Urine Elimination  Recent Flowsheet Documentation  Taken 4/18/2021 2030 by Bethanie Green RN  Urinary Elimination Promotion: catheter patency maintained     Problem: Hypertensive Disorders in Pregnancy  Goal: Maternal-Fetal Stabilization  Outcome: Ongoing, Progressing  Intervention: Monitor and Manage Sign/Symptom  Progression  Recent Flowsheet Documentation  Taken 2021 by Bethanie Green RN  Medication Review/Management: medications reviewed     Problem: Adjustment to Role Transition (Postpartum  Delivery)  Goal: Successful Maternal Role Transition  Outcome: Ongoing, Progressing  Intervention: Support Maternal Role Transition  Recent Flowsheet Documentation  Taken 2021 by Bethanie Green RN  Supportive Measures: active listening utilized     Problem: Bleeding (Postpartum  Delivery)  Goal: Hemostasis  Outcome: Ongoing, Progressing     Problem: Infection (Postpartum  Delivery)  Goal: Absence of Infection Signs and Symptoms  Outcome: Ongoing, Progressing     Problem: Pain (Postpartum  Delivery)  Goal: Acceptable Pain Control  Outcome: Ongoing, Progressing  Intervention: Prevent or Manage Pain  Recent Flowsheet Documentation  Taken 2021 by Bethanie Green RN  Pain Management Interventions: see MAR  Taken 2021 by Bethanie Green RN  Pain Management Interventions: pain management plan reviewed with patient/caregiver     Problem: Postoperative Nausea and Vomiting (Postpartum  Delivery)  Goal: Nausea and Vomiting Relief  Outcome: Ongoing, Progressing     Problem: Postoperative Urinary Retention (Postpartum  Delivery)  Goal: Effective Urinary Elimination  Outcome: Ongoing, Progressing  Intervention: Monitor and Manage Urinary Retention  Recent Flowsheet Documentation  Taken 2021 by Bethanie Green RN  Urinary Elimination Promotion: catheter patency maintained   Goal Outcome Evaluation:     Progress: improving  Outcome Summary:  delivery day 1. VSS; Assessment & fundal checks WNL. BLAS dressing intact, incisional dressing with scant marked old drainage from yesterday. Ambulated to restroom well with assisstance. Bates d/c this AM DTV by 10:30 AM. IV flushed. Managing pain with Motrin. Recieved Cytotec yesterday @  1705 before arriving to post partum. Lovenox order set to start afternoon today.

## 2021-04-19 NOTE — LACTATION NOTE
This note was copied from a baby's chart.  P1 term baby. Mom prefers to exclusively pump. She has HGP, getting a few drops so far and has personal pump at home. Baby is getting formula. Encouraged hydration and call for any assistance or questions.

## 2021-04-19 NOTE — PROGRESS NOTES
[facility]   PROGRESS NOTE    Post-Op Day 1 S/P   Subjective     Patient reports:  Pain is well controlled with percocet and motrin.  She is   ambulating. Tolerating diet. Tolerating po -- normal.  Intake -- c/o of tolerating po solids.   Voiding - without difficulty; flatus reported..  Vaginal bleeding is light. She is pumping breastmilk. Baby doing well.       Objective      Vitals: Vital Signs Range for the last 24 hours  Temperature: Temp:  [98 °F (36.7 °C)-98.4 °F (36.9 °C)] 98.2 °F (36.8 °C)   Temp Source: Temp src: Oral   BP: BP: (105-140)/(47-88) 117/72   Pulse: Heart Rate:  [] 109   Respirations: Resp:  [16-27] 18   SPO2: SpO2:  [96 %-100 %] 98 %   O2 Amount (l/min):     O2 Devices Device (Oxygen Therapy): room air   Weight:              Physical Exam    Lungs respirations unlabored   Abdomen Soft obese, NT ND, fundus-firm NT below umbilicus   Incision  BLAS dressing in place, no erythema or induration noted   Extremities extremities normal, atraumatic, no cyanosis or edema     I reviewed the patient's new clinical results.    Assessment/Plan        Breech presentation, no version    Bicornuate uterus    Excessive weight gain during pregnancy    Rh negative status during pregnancy    Premature cervical dilation in third trimester    Pregnancy    Breech presentation of fetus    Gestational hypertension without significant proteinuria    Obesity in pregnancy, antepartum      Assessment:    Davida Quinonez is Day 1  post-partum  , Low Transverse   .       Plan:  continue post op care.  Encouraged ambulation  On Lovenox for DVT prophylaxis  Continue BLAS dressing  Current BP stable      Gemma Tao MD  2021  12:21 EDT

## 2021-04-20 VITALS
TEMPERATURE: 97.9 F | OXYGEN SATURATION: 94 % | WEIGHT: 293 LBS | RESPIRATION RATE: 18 BRPM | SYSTOLIC BLOOD PRESSURE: 127 MMHG | HEART RATE: 89 BPM | HEIGHT: 69 IN | DIASTOLIC BLOOD PRESSURE: 83 MMHG | BODY MASS INDEX: 43.4 KG/M2

## 2021-04-20 PROBLEM — O21.9 NAUSEA AND VOMITING DURING PREGNANCY PRIOR TO 22 WEEKS GESTATION: Status: RESOLVED | Noted: 2020-09-08 | Resolved: 2021-04-20

## 2021-04-20 PROCEDURE — 25010000002 ENOXAPARIN PER 10 MG: Performed by: OBSTETRICS & GYNECOLOGY

## 2021-04-20 RX ORDER — OXYCODONE HYDROCHLORIDE AND ACETAMINOPHEN 5; 325 MG/1; MG/1
2 TABLET ORAL EVERY 6 HOURS PRN
Qty: 20 TABLET | Refills: 0 | Status: SHIPPED | OUTPATIENT
Start: 2021-04-20 | End: 2021-04-25

## 2021-04-20 RX ORDER — IBUPROFEN 600 MG/1
600 TABLET ORAL EVERY 6 HOURS PRN
Qty: 30 TABLET | Refills: 0 | Status: SHIPPED | OUTPATIENT
Start: 2021-04-20 | End: 2021-05-11

## 2021-04-20 RX ADMIN — ENOXAPARIN SODIUM 40 MG: 40 INJECTION SUBCUTANEOUS at 02:55

## 2021-04-20 RX ADMIN — OXYCODONE HYDROCHLORIDE AND ACETAMINOPHEN 2 TABLET: 5; 325 TABLET ORAL at 11:12

## 2021-04-20 RX ADMIN — IBUPROFEN 600 MG: 600 TABLET ORAL at 08:07

## 2021-04-20 RX ADMIN — Medication 1 TABLET: at 08:07

## 2021-04-20 RX ADMIN — IBUPROFEN 600 MG: 600 TABLET ORAL at 00:28

## 2021-04-20 RX ADMIN — OXYCODONE HYDROCHLORIDE AND ACETAMINOPHEN 2 TABLET: 5; 325 TABLET ORAL at 05:19

## 2021-04-20 RX ADMIN — DOCUSATE SODIUM 50MG AND SENNOSIDES 8.6MG 1 TABLET: 8.6; 5 TABLET, FILM COATED ORAL at 08:07

## 2021-04-20 NOTE — LACTATION NOTE
Mom reports she will probably formula feed at home. She denies questions. Gave [facility]PLC, zoom and mommy and me info if mom decides to cont BF.    Lactation Consult Note    Evaluation Completed: 2021 08:13 EDT  Patient Name: Davida Quinonez  :  1995  MRN:  [id number]     REFERRAL  INFORMATION:                                         DELIVERY HISTORY:        Skin to skin initiation date/time: 2021  6:20 PM   Skin to skin end date/time:           MATERNAL ASSESSMENT:                               INFANT ASSESSMENT:  Information for the patient's :  Pepper Quinonez [[id number]]   No past medical history on file.                                                                                                     MATERNAL INFANT FEEDING:                                                                       EQUIPMENT TYPE:                                 BREAST PUMPING:          LACTATION REFERRALS:                                          As per HPI / Formulation / Summary

## 2021-04-20 NOTE — PROGRESS NOTES
" POSTPARTUM ROUNDS    SUBJECTIVE:    CC:  POD2 from CS    Subjective:  Pt is doing well, pain is well controlled, pt is ambulating and tolerating a regular diet.  Voiding well.  She desires to go home    Review of Systems - Negative except appropriate pain  NO FEVER OR CHILLS , NO NAUSEA OR VOMITING, NO LEG PAIN    OBJECTIVE:  Vital Signs: /83 (BP Location: Left arm, Patient Position: Sitting)   Pulse 89   Temp 97.9 °F (36.6 °C) (Oral)   Resp 18   Ht 175.3 cm (69\")   Wt (!) 141 kg (310 lb 9.6 oz)   LMP  (LMP Unknown)   SpO2 94%   Breastfeeding Yes   BMI 45.87 kg/m²     Intake/Output Summary (Last 24 hours) at 2021 1041  Last data filed at 2021  Gross per 24 hour   Intake 480 ml   Output 725 ml   Net -245 ml       Constitutional: The patient is well nourished. Alert and oriented.  Mental status is upbeat, no signs postpartum depression.   Cardiovascular:RRR  Resp: nonlabored breathing  The incision is  Covered with clean , dry BLAS dressing  Gastrointestinal: fundus firm below umbilicus  Extremities:1 + edema, non-tender bilateral    LABS / IMAGING:  Lab Results (last 24 hours)     ** No results found for the last 24 hours. **          ASSESSMENT AND PLAN:    POD 2 from  delivery:    Patient Active Problem List   Diagnosis   • Depression   • Bicornuate uterus   • Nausea and vomiting during pregnancy prior to 22 weeks gestation   • Excessive weight gain during pregnancy   • Rh negative status during pregnancy   • Premature cervical dilation in third trimester   • Pregnancy   • Breech presentation of fetus   • Breech presentation, no version   • Gestational hypertension without significant proteinuria   • Obesity in pregnancy, antepartum       Patient is postop day 2 from LTCS  Patient is doing well   Encourage ambulation   Will discharge, follow up POD 8 for incision check and removal BLAS dressing  Instructed pt on how to remove dressing if it looks wet or has an odor or " drainage, looks great today    Lory Dang MD    4/20/2021  10:41 EDT

## 2021-04-20 NOTE — DISCHARGE SUMMARY
[facility]  Delivery Discharge Summary    Discharge Diagnosis:     1.  delivered  2. Breech presentation      Patient Active Problem List   Diagnosis   • Depression   • Bicornuate uterus   • Excessive weight gain during pregnancy   • Rh negative status during pregnancy   • Premature cervical dilation in third trimester   • Pregnancy   • Breech presentation of fetus   • Gestational hypertension without significant proteinuria   • Obesity in pregnancy, antepartum        Primary OB Clinician: Bruce    EDC: Estimated Date of Delivery: 21    Gestational Age:38w3d    Antepartum complications: gestational hypertension    Date of Delivery: 2021   Time of Delivery: 2:00 PM     Delivered By:  Yadi Barrera     Delivery Type: , Low Transverse      Tubal Ligation: n/a    Baby: male infant;   Apgar:  8  @ 1 minute /   Apgar:  9  @ 5 minutes        Anesthesia: Spinal      Intrapartum complications: None    Laceration: No    Episiotomy: No    Placenta: Expressed     Feeding method: Breastfeeding Status: Yes    Rh Immune globulin given: no, baby is negative    Rubella vaccine given: no    Discharge Date: 2021; Discharge Time: 10:48 EDT    Early Discharge:  NO  Hospital Course:  The patient was admitted following delivery.  She did well postpartum with normal return of bowel function and remained afebrile.    Blood type:A neg, baby is negative  Rubella immune  dTap given prenatally        Plan:    Follow-up appointment with DR Barrera in 1 week.

## 2021-04-20 NOTE — PLAN OF CARE
Goal Outcome Evaluation:  Plan of Care Reviewed With: patient  Progress: improving  Outcome Summary: VSS.  Pt ambulating and voiding.  Pain controlled w/po pain meds.  BLAS dressing c/d/i and blinking green.

## 2021-04-22 ENCOUNTER — TELEMEDICINE (OUTPATIENT)
Dept: FAMILY MEDICINE CLINIC | Facility: CLINIC | Age: 26
End: 2021-04-22

## 2021-04-22 VITALS — BODY MASS INDEX: 43.4 KG/M2 | HEIGHT: 69 IN | WEIGHT: 293 LBS

## 2021-04-22 DIAGNOSIS — F41.9 ANXIETY: Primary | ICD-10-CM

## 2021-04-22 PROCEDURE — 99213 OFFICE O/P EST LOW 20 MIN: CPT | Performed by: NURSE PRACTITIONER

## 2021-04-22 NOTE — PROGRESS NOTES
"Chief Complaint  Anxiety    Subjective          Davida Quinonez presents to [facility]  Davida presents for worsening anxiety since the birth of her baby boy. She had a miscarriage prior to this pregnancy and now is unable to sleep because she is constantly worried he will stop breathing. The first night home she slept an hour, the second night slept a few hours,     Anxiety  Presents for initial visit. Onset was in the past 7 days. Symptoms include excessive worry, insomnia, nervous/anxious behavior and obsessions. Patient reports no chest pain, depressed mood, panic, restlessness, shortness of breath or suicidal ideas. Symptoms occur constantly. The severity of symptoms is interfering with daily activities. Exacerbated by: new baby. The quality of sleep is poor.     Risk factors include a major life event. Her past medical history is significant for anxiety/panic attacks and depression.       Objective   Vital Signs:   Ht 175.3 cm (69\")   Wt (!) 139 kg (306 lb)   BMI 45.19 kg/m²     Physical Exam  Constitutional:       Appearance: Normal appearance.   Neurological:      Mental Status: She is alert and oriented to person, place, and time.   Psychiatric:         Mood and Affect: Mood normal.        Result Review :                 Assessment and Plan    Diagnoses and all orders for this visit:    1. Anxiety (Primary)    Other orders  -     sertraline (Zoloft) 50 MG tablet; Take 1/2 tab po daily x 7 days, then increase to 1 tab po daily  Dispense: 30 tablet; Refill: 2        Follow Up   No follow-ups on file.  Patient was given instructions and counseling regarding her condition or for health maintenance advice. Please see specific information pulled into the AVS if appropriate.     Patient has tried pumping breast milk but not having enough to feed baby, bottle feeding, but states her milk has not come in.   Will start zoloft, start 25 mg daily, then gradually increase to 50 mg " after one week. Advised this is the recommended SSRI for breastfeeding in the event her milk comes in and she prefers pumping, verbalized understanding.   Recommend follow up in 8 weeks, has an appointment scheduled in June, will re-evaluate at that time. She will contact myself or OB/GYN if needed for any acute concerns.  Discussed sleeping when the baby is sleeping or when her spouse is home to watch the baby as lack of sleep can worsen anxiety.     You have chosen to receive care through a telehealth visit.  Do you consent to use a video/audio connection for your medical care today? Yes  Time spent 15 minutes

## 2021-04-26 ENCOUNTER — POSTPARTUM VISIT (OUTPATIENT)
Dept: OBSTETRICS AND GYNECOLOGY | Age: 26
End: 2021-04-26

## 2021-04-26 VITALS
WEIGHT: 293 LBS | HEIGHT: 69 IN | DIASTOLIC BLOOD PRESSURE: 76 MMHG | SYSTOLIC BLOOD PRESSURE: 122 MMHG | BODY MASS INDEX: 43.4 KG/M2

## 2021-04-26 PROCEDURE — 0503F POSTPARTUM CARE VISIT: CPT | Performed by: NURSE PRACTITIONER

## 2021-04-28 ENCOUNTER — POSTPARTUM VISIT (OUTPATIENT)
Dept: OBSTETRICS AND GYNECOLOGY | Age: 26
End: 2021-04-28

## 2021-04-28 VITALS
DIASTOLIC BLOOD PRESSURE: 82 MMHG | WEIGHT: 292 LBS | SYSTOLIC BLOOD PRESSURE: 120 MMHG | HEIGHT: 69 IN | BODY MASS INDEX: 43.25 KG/M2

## 2021-04-28 DIAGNOSIS — R30.0 DYSURIA: Primary | ICD-10-CM

## 2021-04-28 DIAGNOSIS — Z13.89 SCREENING FOR BLOOD OR PROTEIN IN URINE: ICD-10-CM

## 2021-04-28 LAB
BILIRUB BLD-MCNC: ABNORMAL MG/DL
CLARITY, POC: CLEAR
COLOR UR: ABNORMAL
GLUCOSE UR STRIP-MCNC: NEGATIVE MG/DL
KETONES UR QL: NEGATIVE
LEUKOCYTE EST, POC: NEGATIVE
NITRITE UR-MCNC: NEGATIVE MG/ML
PH UR: 5.5 [PH] (ref 5–8)
PROT UR STRIP-MCNC: ABNORMAL MG/DL
RBC # UR STRIP: ABNORMAL /UL
SP GR UR: 1.03 (ref 1–1.03)
UROBILINOGEN UR QL: NORMAL

## 2021-04-28 PROCEDURE — 99213 OFFICE O/P EST LOW 20 MIN: CPT | Performed by: NURSE PRACTITIONER

## 2021-04-28 PROCEDURE — 81002 URINALYSIS NONAUTO W/O SCOPE: CPT | Performed by: NURSE PRACTITIONER

## 2021-04-28 NOTE — PROGRESS NOTES
Subjective   Davida Quinonez is a 26 y.o. female.     History of Present Illness     Davida presents for UA dip today. States she forgot to mention at her visit 2 days ago she has been experiencing mild intermittent dysuria for the past week. UA negative. Admits to not drinking many fluids. No incontinence or leaking. She is emptying bladder fully. Denies fevers/chills, backache, abdominal pain. No other concerns.     The following portions of the patient's history were reviewed and updated as appropriate: allergies, current medications, past family history, past medical history, past social history, past surgical history and problem list.    Review of Systems   Constitutional: Negative for chills, fatigue and fever.   Gastrointestinal: Negative for abdominal distention and abdominal pain.   Genitourinary: Positive for dysuria. Negative for decreased urine volume, difficulty urinating, dyspareunia, flank pain, frequency, genital sores, hematuria, menstrual problem, pelvic pain, pelvic pressure, urgency, urinary incontinence, vaginal bleeding, vaginal discharge and vaginal pain.       Objective   Physical Exam  Constitutional:       Appearance: Normal appearance. She is not ill-appearing.   Abdominal:      Comments: Low transverse incision CDI   Skin:     General: Skin is warm and dry.   Neurological:      General: No focal deficit present.      Mental Status: She is alert and oriented to person, place, and time.   Psychiatric:         Mood and Affect: Mood normal.         Behavior: Behavior normal.           Assessment/Plan   Diagnoses and all orders for this visit:    1. Dysuria (Primary)    2. Screening for blood or protein in urine  -     POC Urinalysis Dipstick      UA negative but will send culture  Advised increase fluids  Call if symptoms persist or worsen  Keep scheduled pp visit    SCOTT Robles

## 2021-04-30 LAB
BACTERIA UR CULT: NO GROWTH
BACTERIA UR CULT: NORMAL

## 2021-05-05 ENCOUNTER — POSTPARTUM VISIT (OUTPATIENT)
Dept: OBSTETRICS AND GYNECOLOGY | Age: 26
End: 2021-05-05

## 2021-05-05 ENCOUNTER — TELEPHONE (OUTPATIENT)
Dept: OBSTETRICS AND GYNECOLOGY | Age: 26
End: 2021-05-05

## 2021-05-05 VITALS
DIASTOLIC BLOOD PRESSURE: 62 MMHG | SYSTOLIC BLOOD PRESSURE: 108 MMHG | WEIGHT: 283 LBS | BODY MASS INDEX: 41.92 KG/M2 | HEIGHT: 69 IN

## 2021-05-05 PROCEDURE — 0503F POSTPARTUM CARE VISIT: CPT | Performed by: NURSE PRACTITIONER

## 2021-05-05 NOTE — TELEPHONE ENCOUNTER
----- Message from Rosy Recio MA sent at 5/5/2021  7:37 AM EDT -----  Regarding: FW: Non-Urgent Medical Question  Contact: [phone]    ----- Message -----  From: Davida Quinonez  Sent: 5/4/2021   7:53 PM EDT  To: [facility]  Subject: Non-Urgent Medical Question                      Hello,  I had my  check my incision sight tonight and this is what it looked like. I am not sure if I need to come in or what I need to do. I put some peroxide on it.

## 2021-05-11 ENCOUNTER — POSTPARTUM VISIT (OUTPATIENT)
Dept: OBSTETRICS AND GYNECOLOGY | Age: 26
End: 2021-05-11

## 2021-05-11 ENCOUNTER — TELEPHONE (OUTPATIENT)
Dept: OBSTETRICS AND GYNECOLOGY | Age: 26
End: 2021-05-11

## 2021-05-11 VITALS
HEIGHT: 69 IN | SYSTOLIC BLOOD PRESSURE: 110 MMHG | DIASTOLIC BLOOD PRESSURE: 68 MMHG | WEIGHT: 282 LBS | BODY MASS INDEX: 41.77 KG/M2

## 2021-05-11 PROBLEM — Z67.91 RH NEGATIVE STATUS DURING PREGNANCY: Status: RESOLVED | Noted: 2021-01-26 | Resolved: 2021-05-11

## 2021-05-11 PROBLEM — O34.33 PREMATURE CERVICAL DILATION IN THIRD TRIMESTER: Status: RESOLVED | Noted: 2021-03-15 | Resolved: 2021-05-11

## 2021-05-11 PROBLEM — O99.210 OBESITY IN PREGNANCY, ANTEPARTUM: Status: RESOLVED | Noted: 2021-04-18 | Resolved: 2021-05-11

## 2021-05-11 PROBLEM — O13.9 GESTATIONAL HYPERTENSION WITHOUT SIGNIFICANT PROTEINURIA: Status: RESOLVED | Noted: 2021-04-18 | Resolved: 2021-05-11

## 2021-05-11 PROBLEM — Z34.90 PREGNANCY: Status: RESOLVED | Noted: 2021-03-15 | Resolved: 2021-05-11

## 2021-05-11 PROBLEM — O26.00 EXCESSIVE WEIGHT GAIN DURING PREGNANCY: Status: RESOLVED | Noted: 2020-12-29 | Resolved: 2021-05-11

## 2021-05-11 PROBLEM — O26.899 RH NEGATIVE STATUS DURING PREGNANCY: Status: RESOLVED | Noted: 2021-01-26 | Resolved: 2021-05-11

## 2021-05-11 PROCEDURE — 0503F POSTPARTUM CARE VISIT: CPT | Performed by: OBSTETRICS & GYNECOLOGY

## 2021-05-11 NOTE — TELEPHONE ENCOUNTER
----- Message from Rosy Recio MA sent at 5/11/2021 12:42 PM EDT -----  Regarding: FW: Non-Urgent Medical Question  Contact: [phone]    ----- Message -----  From: Davida Quinonez  Sent: 5/11/2021  10:55 AM EDT  To: [facility] Clinical Pool  Subject: Non-Urgent Medical Question                      Hello,  I forgot to ask about returning to work on May 28th. 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 This will be the last day for me until August 2nd but would like to return that day if possible. If it is can you please send a letter stating the date of my release to return to work and any restrictions i might have. Fax [phone] or email [email or web address].

## 2021-05-11 NOTE — PROGRESS NOTES
"Subjective   Davida Quinonez is a 26 y.o. female who presents for a postpartum visit. She is 3 weeks postpartum following a low cervical transverse  section. I have fully reviewed the prenatal and intrapartum course. The delivery was at 38-3 gestational weeks. Outcome: primary  section, low transverse incision. Anesthesia: spinal. Postpartum course has been uncomplicated. Baby's course has been uncomplicated. Baby is feeding by bottle. Bleeding thin lochia. Bowel function is normal. Bladder function is normal. Patient is not sexually active. Contraception method is OCP (estrogen/progesterone). Postpartum depression screening: negative.    The following portions of the patient's history were reviewed and updated as appropriate: allergies, current medications, past family history, past medical history, past social history, past surgical history and problem list.    Review of Systems  Pertinent items are noted in HPI.    Objective   /68   Ht 175.3 cm (69\")   Wt 128 kg (282 lb)   LMP  (LMP Unknown)   Breastfeeding No   BMI 41.64 kg/m²    General:  alert, appears stated age and cooperative   Abdomen: soft, non-tender; bowel sounds normal; no masses,  no organomegaly and Inc Healed well     Assessment/Plan   postpartum exam. Pap smear not done at today's visit.    1. Contraception: OCP (estrogen/progesterone)  2. PPD - stable on zoloft   3. Follow up in: 4 weeks or as needed.           "

## 2021-06-08 ENCOUNTER — POSTPARTUM VISIT (OUTPATIENT)
Dept: OBSTETRICS AND GYNECOLOGY | Age: 26
End: 2021-06-08

## 2021-06-08 VITALS
BODY MASS INDEX: 42.06 KG/M2 | DIASTOLIC BLOOD PRESSURE: 70 MMHG | SYSTOLIC BLOOD PRESSURE: 112 MMHG | HEIGHT: 69 IN | WEIGHT: 284 LBS

## 2021-06-08 DIAGNOSIS — Z30.011 ENCOUNTER FOR INITIAL PRESCRIPTION OF CONTRACEPTIVE PILLS: ICD-10-CM

## 2021-06-08 PROCEDURE — 0503F POSTPARTUM CARE VISIT: CPT | Performed by: OBSTETRICS & GYNECOLOGY

## 2021-06-08 RX ORDER — LEVONORGESTREL AND ETHINYL ESTRADIOL 0.1-0.02MG
1 KIT ORAL DAILY
Qty: 84 TABLET | Refills: 3 | Status: SHIPPED | OUTPATIENT
Start: 2021-06-08 | End: 2021-12-15

## 2021-06-08 NOTE — PROGRESS NOTES
"Subjective   Davida Quinonez is a 26 y.o. female who presents for a postpartum visit. She is 7 weeks postpartum following a low cervical transverse  section. I have fully reviewed the prenatal and intrapartum course. The delivery was at 38-3 gestational weeks. Outcome: primary  section, low transverse incision. Anesthesia: spinal. Postpartum course has been uncomplicated. Baby's course has been uncomplicated. Baby is feeding by bottle. Bleeding no bleeding. Bowel function is normal. Bladder function is normal. Patient is not sexually active. Contraception method is OCP (estrogen/progesterone). Postpartum depression screening: negative.    The following portions of the patient's history were reviewed and updated as appropriate: allergies, current medications, past family history, past medical history, past social history, past surgical history and problem list.    Review of Systems  Pertinent items are noted in HPI.    Objective   /70   Ht 175.3 cm (69\")   Wt 129 kg (284 lb)   LMP  (LMP Unknown)   Breastfeeding No   BMI 41.94 kg/m²    General:  alert, appears stated age and cooperative   Abdomen: soft, non-tender; bowel sounds normal; no masses,  no organomegaly and Inc C/D/I      Assessment/Plan   postpartum exam. Pap smear not done at today's visit.    1. Contraception: OCP (estrogen/progesterone)  2. Follow up in: 4 months or as needed.  3. PPD - stable on Zoloft          "

## 2021-07-28 ENCOUNTER — OFFICE VISIT (OUTPATIENT)
Dept: FAMILY MEDICINE CLINIC | Facility: CLINIC | Age: 26
End: 2021-07-28

## 2021-07-28 VITALS
WEIGHT: 273.9 LBS | DIASTOLIC BLOOD PRESSURE: 80 MMHG | SYSTOLIC BLOOD PRESSURE: 110 MMHG | TEMPERATURE: 95.9 F | BODY MASS INDEX: 40.57 KG/M2 | HEART RATE: 83 BPM | HEIGHT: 69 IN | OXYGEN SATURATION: 98 % | RESPIRATION RATE: 16 BRPM

## 2021-07-28 DIAGNOSIS — Z00.00 ANNUAL PHYSICAL EXAM: Primary | ICD-10-CM

## 2021-07-28 PROCEDURE — 99395 PREV VISIT EST AGE 18-39: CPT | Performed by: NURSE PRACTITIONER

## 2021-07-28 NOTE — PROGRESS NOTES
"Chief Complaint  Annual Exam    Subjective          Davida Quinonez presents to [facility]  Davida presents for an annual physical.   Has a 3 month old at home.   Anxiety comes and goes, when at work, she is ok.   Started zoloft, but felt it was keeping her up at night.   Sad most of the time    Left shoulder pain, hx slap tear, worse, recommended PT for a month, then repeat MRI  Dr. Court lentz, going off twice      Objective   Vital Signs:   /80   Pulse 83   Temp 95.9 °F (35.5 °C) (Temporal)   Resp 16   Ht 175.3 cm (69\")   Wt 124 kg (273 lb 14.4 oz)   SpO2 98%   BMI 40.45 kg/m²     Physical Exam  Vitals reviewed.   Constitutional:       Appearance: Normal appearance.   HENT:      Head: Normocephalic and atraumatic.      Right Ear: Tympanic membrane and ear canal normal.      Left Ear: Tympanic membrane and ear canal normal.      Nose: Nose normal.      Mouth/Throat:      Mouth: Mucous membranes are moist.   Eyes:      Conjunctiva/sclera: Conjunctivae normal.      Pupils: Pupils are equal, round, and reactive to light.   Cardiovascular:      Rate and Rhythm: Normal rate and regular rhythm.      Heart sounds: No murmur heard.   No friction rub. No gallop.    Pulmonary:      Effort: Pulmonary effort is normal. No respiratory distress.      Breath sounds: Normal breath sounds. No wheezing, rhonchi or rales.   Abdominal:      General: Bowel sounds are normal. There is no distension.      Palpations: Abdomen is soft. There is no mass.      Tenderness: There is no abdominal tenderness.      Hernia: No hernia is present.   Neurological:      Mental Status: She is alert and oriented to person, place, and time.   Psychiatric:         Mood and Affect: Mood normal.        Result Review :     Common labs    Common Labsle 4/15/21 4/15/21 4/18/21 4/18/21 4/18/21 4/19/21    1442 1442 1141 1141 1844    Glucose  114 (A)  86     BUN  8  7     Creatinine  0.52 (A)  0.40 (A)     eGFR " Non  Am  141  >150     Sodium  137  134 (A)     Potassium  4.4  4.2     Chloride  106  103     Calcium  9.1  9.3     Albumin  3.70  4.00     Total Bilirubin  0.2  0.2     Alkaline Phosphatase  112  114     AST (SGOT)  12  15     ALT (SGPT)  14  11     WBC 10.47  12.69 (A)  20.18 (A) 11.65 (A)   Hemoglobin 12.0  12.7  10.4 (A) 10.0 (A)   Hematocrit 36.0  38.0  30.9 (A) 29.7 (A)   Platelets 265  257  263 200   (A) Abnormal value                      Assessment and Plan    Diagnoses and all orders for this visit:    1. Annual physical exam (Primary)    2. Postpartum depression        Follow Up   No follow-ups on file.  Patient was given instructions and counseling regarding her condition or for health maintenance advice. Please see specific information pulled into the AVS if appropriate.       Information/counseling provided to the patient regarding periodic reggie maintenance recommendations, including but not limited to immunizations, diet/exercise/healthy lifestyle, laboratory, and other screenings. BMI is discussed. Appropriate exercise, diet, and weight plans are discussed.    Recommend resuming zoloft, she does have continued anxiety and depression, she will try taking in the morning as she thought it may have caused insomnia. Recommend starting 50 mg and seeing how her symptoms progress.    Declines labs today, recent labs this year with childbirth, agree that labs are not needed today  Up to date on her covid vaccine  Pap up to date    Right shoulder pain, seeing ortho, starting PT twice weekly and will likely follow up with MRI

## 2021-09-01 NOTE — TELEPHONE ENCOUNTER
Rx Refill Note  Requested Prescriptions     Pending Prescriptions Disp Refills   • sertraline (Zoloft) 50 MG tablet 30 tablet 2     Sig: Take 1/2 tab po daily x 7 days, then increase to 1 tab po daily      Last office visit with prescribing clinician: 7/28/2021      Next office visit with prescribing clinician: Visit date not found            Mitch Purcell MA  09/01/21, 16:27 EDT

## 2021-12-15 ENCOUNTER — OFFICE VISIT (OUTPATIENT)
Dept: OBSTETRICS AND GYNECOLOGY | Age: 26
End: 2021-12-15

## 2021-12-15 VITALS
DIASTOLIC BLOOD PRESSURE: 74 MMHG | HEIGHT: 69 IN | BODY MASS INDEX: 38.8 KG/M2 | SYSTOLIC BLOOD PRESSURE: 112 MMHG | WEIGHT: 262 LBS

## 2021-12-15 DIAGNOSIS — Z01.419 ENCOUNTER FOR GYNECOLOGICAL EXAMINATION WITHOUT ABNORMAL FINDING: Primary | ICD-10-CM

## 2021-12-15 DIAGNOSIS — Z12.4 SCREENING FOR MALIGNANT NEOPLASM OF THE CERVIX: ICD-10-CM

## 2021-12-15 DIAGNOSIS — Z13.89 SCREENING FOR BLOOD OR PROTEIN IN URINE: ICD-10-CM

## 2021-12-15 LAB
BILIRUB BLD-MCNC: NEGATIVE MG/DL
GLUCOSE UR STRIP-MCNC: NEGATIVE MG/DL
KETONES UR QL: NEGATIVE
LEUKOCYTE EST, POC: NEGATIVE
NITRITE UR-MCNC: NEGATIVE MG/ML
PH UR: 6 [PH] (ref 5–8)
PROT UR STRIP-MCNC: NEGATIVE MG/DL
RBC # UR STRIP: NEGATIVE /UL
SP GR UR: 1.02 (ref 1–1.03)
UROBILINOGEN UR QL: NORMAL

## 2021-12-15 PROCEDURE — 81002 URINALYSIS NONAUTO W/O SCOPE: CPT | Performed by: OBSTETRICS & GYNECOLOGY

## 2021-12-15 PROCEDURE — 99395 PREV VISIT EST AGE 18-39: CPT | Performed by: OBSTETRICS & GYNECOLOGY

## 2021-12-15 NOTE — PROGRESS NOTES
"Subjective   Davida Quinonez is a 26 y.o. female cc: annual visit today , last pap 9/23/19 neg , contraception - none periods are normal.  I last saw in June for a PP visit.   Valente is 8 months old.  Patient wanted to take a break from ocps.  Reports periods as regular.   History of Present Illness    The following portions of the patient's history were reviewed and updated as appropriate: allergies, current medications, past family history, past medical history, past social history, past surgical history and problem list.    Review of Systems   Constitutional: Negative for chills, fatigue and fever.   Gastrointestinal: Negative for abdominal distention and abdominal pain.   Genitourinary: Negative for dyspareunia, dysuria, menstrual problem, pelvic pain, vaginal bleeding, vaginal discharge and vaginal pain.   All other systems reviewed and are negative.  /74   Ht 175.3 cm (69\")   Wt 119 kg (262 lb)   LMP 11/24/2021 (Approximate)   Breastfeeding No   BMI 38.69 kg/m²       Objective   Physical Exam  Vitals and nursing note reviewed.   Constitutional:       Appearance: Normal appearance. She is well-developed. She is obese.   Neck:      Thyroid: No thyromegaly.   Pulmonary:      Effort: Pulmonary effort is normal.   Chest:   Breasts:      Right: No mass, nipple discharge, skin change or tenderness.      Left: No mass, nipple discharge, skin change or tenderness.       Abdominal:      General: There is no distension.      Palpations: Abdomen is soft.      Tenderness: There is no abdominal tenderness.   Genitourinary:     General: Normal vulva.      Exam position: Lithotomy position.      Labia:         Right: No rash or lesion.         Left: No rash or lesion.       Vagina: Normal. No vaginal discharge, bleeding or prolapsed vaginal walls.      Cervix: No friability, lesion or cervical bleeding.      Uterus: Not enlarged and not tender.       Adnexa:         Right: No mass or tenderness.          Left: No " mass or tenderness.     Musculoskeletal:         General: Normal range of motion.      Cervical back: Normal range of motion.   Skin:     General: Skin is warm and dry.      Findings: No rash.   Neurological:      Mental Status: She is alert and oriented to person, place, and time.   Psychiatric:         Mood and Affect: Mood normal.         Behavior: Behavior normal.           Assessment/Plan   Diagnoses and all orders for this visit:    1. Encounter for gynecological examination without abnormal finding (Primary)    2. Screening for malignant neoplasm of the cervix  -     IGP, Rfx Aptima HPV ASCU    3. Screening for blood or protein in urine  -     POC Urinalysis Dipstick      Counseling was given to patient for the following topics: instructions for management, risks and benefits of treatment options, importance of treatment compliance and self-breast exams .   Return in about 1 year (around 12/15/2022) for Annual physical.

## 2021-12-20 LAB
CONV .: NORMAL
CYTOLOGIST CVX/VAG CYTO: NORMAL
CYTOLOGY CVX/VAG DOC CYTO: NORMAL
CYTOLOGY CVX/VAG DOC THIN PREP: NORMAL
DX ICD CODE: NORMAL
HIV 1 & 2 AB SER-IMP: NORMAL
Lab: NORMAL
OTHER STN SPEC: NORMAL
STAT OF ADQ CVX/VAG CYTO-IMP: NORMAL

## 2022-03-07 ENCOUNTER — TELEMEDICINE (OUTPATIENT)
Dept: FAMILY MEDICINE CLINIC | Facility: CLINIC | Age: 27
End: 2022-03-07

## 2022-03-07 DIAGNOSIS — J11.1 INFLUENZA: Primary | ICD-10-CM

## 2022-03-07 PROCEDURE — 99213 OFFICE O/P EST LOW 20 MIN: CPT | Performed by: FAMILY MEDICINE

## 2022-03-07 RX ORDER — OSELTAMIVIR PHOSPHATE 75 MG/1
75 CAPSULE ORAL 2 TIMES DAILY
Qty: 10 CAPSULE | Refills: 0 | Status: SHIPPED | OUTPATIENT
Start: 2022-03-07 | End: 2022-03-15

## 2022-03-07 NOTE — PROGRESS NOTES
Chief Complaint  No chief complaint on file.    Subjective          Davida Quinonez presents to [facility]  History of Present Illness     Coronavirus pandemic telehealth visit.  Good audio and video connection.  Patient gave informed consent through the [employer] check in process.    [facility] video visit.  I am seeing this patient for the first time.  Her primary care provider is not available.  She states she came down with a fever yesterday of 101.  She was in bed most of the day.  Felt very tired.  She had a negative Covid test both yesterday and today.  She had her Covid booster last week.  And had previous vaccination series last year.  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  She does not feel short of breath.  Her voices scratchy.  She was coughing a lot yesterday and last night.  She states she feels a little bit better today.  Her LMP was less than a month ago.  She is not short of breath.  No vomiting.  No diarrhea.        Objective   Vital Signs:   There were no vitals taken for this visit.    Physical Exam  Constitutional:       Comments: She appears no acute distress.  Her voice is hoarse.  No stridor.  She speaks in complete sentences without being short of breath.  Her color appears good.  I do not hear audible wheezing over the connection.        Result Review :                 Assessment and Plan    Diagnoses and all orders for this visit:    1. Influenza (Primary)    Other orders  -     oseltamivir (Tamiflu) 75 MG capsule; Take 1 capsule by mouth 2 (Two) Times a Day.  Dispense: 10 capsule; Refill: 0      Acute URI syndrome suggestive of influenza.  She was exposed to it where she works.  Has had 2 separate negative COVID-19 test so far.  She likely does not have bacterial pneumonia at this time.  I recommending Tamiflu to reduce the duration and severity of her possible influenza symptoms.  She understands seek medical  attention with severe symptoms, shortness of breath, or other severe concerns.  Okay with over-the-counter Robitussin and NSAIDs.    Total duration of visit 15 minutes including discussion with patient, getting online, documentation, and ordering medicine.      Follow Up   No follow-ups on file.  Patient was given instructions and counseling regarding her condition or for health maintenance advice. Please see specific information pulled into the AVS if appropriate.

## 2022-03-07 NOTE — EXTERNAL PATIENT INSTRUCTIONS
"Patient Education   Table of Contents       Influenza, Adult     To view videos and all your education online visit,   [email or web address]   or scan this QR code with your smartphone.                  Influenza, Adult     Influenza, also called \"the flu,\" is a viral infection that mainly affects the respiratory tract. This includes the lungs, nose, and throat. The flu spreads easily from person to person (is contagious). It causes common cold symptoms, along with high fever and body aches.   What are the causes?    This condition is caused by the influenza virus. You can get the virus by:       Breathing in droplets that are in the air from an infected person's cough or sneeze.       Touching something that has the virus on it (has been contaminated) and then touching your mouth, nose, or eyes.     What increases the risk?    The following factors may make you more likely to get the flu:       Not washing or sanitizing your hands often.       Having close contact with many people during cold and flu season.       Touching your mouth, eyes, or nose without first washing or sanitizing your hands.       Not getting an annual flu shot.      You may have a higher risk for the flu, including serious problems, such as a lung infection (pneumonia), if you:       Are older than 65.       Are pregnant.       Have a weakened disease-fighting system (immune system). This includes people who have HIV or AIDS, are on chemotherapy, or are taking medicines that reduce (suppress) the immune system.       Have a long-term (chronic) illness, such as heart disease, kidney disease, diabetes, or lung disease.       Have a liver disorder.       Are severely overweight (morbidly obese).       Have anemia.       Have asthma.     What are the signs or symptoms?    Symptoms of this condition usually begin suddenly and last 4?14 days. These may include:       Fever and chills.       Headaches, body aches, or muscle aches.       " Sore throat.       Cough.       Runny or stuffy (congested) nose.       Chest discomfort.       Poor appetite.       Weakness or fatigue.       Dizziness.       Nausea or vomiting.     How is this diagnosed?    This condition may be diagnosed based on:       Your symptoms and medical history.       A physical exam.       Swabbing your nose or throat and testing the fluid for the influenza virus.     How is this treated?   If the flu is diagnosed early, you can be treated with antiviral medicine that is given by mouth (orally) or through an IV. This can help reduce how severe the illness is and how long it lasts.    Taking care of yourself at home can help relieve symptoms. Your health care provider may recommend:       Taking over-the-counter medicines.       Drinking plenty of fluids.     In many cases, the flu goes away on its own. If you have severe symptoms or complications, you may be treated in a hospital.   Follow these instructions at home:   Activity         Rest as needed and get plenty of sleep.       Stay home from work or school as told by your health care provider. Unless you are visiting your health care provider, avoid leaving home until your fever has been gone for 24 hours without taking medicine.     Eating and drinking         Take an oral rehydration solution (ORS). This is a drink that is sold at pharmacies and retail stores.       Drink enough fluid to keep your urine pale yellow.       Drink clear fluids in small amounts as you are able. Clear fluids include water, ice chips, fruit juice mixed with water, and low-calorie sports drinks.       Eat bland, easy-to-digest foods in small amounts as you are able. These foods include bananas, applesauce, rice, lean meats, toast, and crackers.       Avoid drinking fluids that contain a lot of sugar or caffeine, such as energy drinks, regular sports drinks, and soda.       Avoid alcohol.       Avoid spicy or fatty foods.     General instructions        "        Take over-the-counter and prescription medicines only as told by your health care provider.      Use a cool mist humidifier to add humidity to the air in your home. This can make it easier to breathe.       When using a cool mist humidifier, clean it daily. Empty the water and replace it with clean water.       Cover your mouth and nose when you cough or sneeze.       Wash your hands with soap and water often and for at least 20 seconds, especially after you cough or sneeze. If soap and water are not available, use alcohol-based hand .       Keep all follow-up visits. This is important.         How is this prevented?            Get an annual flu shot. This is usually available in late summer, fall, or winter. Ask your health care provider when you should get your flu shot.       Avoid contact with people who are sick during cold and flu season. This is generally fall and winter.       Contact a health care provider if:         You develop new symptoms.      You have:       Chest pain.       Diarrhea.       A fever.       Your cough gets worse.       You produce more mucus.       You feel nauseous or you vomit.     Get help right away if you:         Develop shortness of breath or have difficulty breathing.       Have skin or nails that turn a bluish color.       Have severe pain or stiffness in your neck.       Develop a sudden headache or sudden pain in your face or ear.       Cannot eat or drink without vomiting.     These symptoms may represent a serious problem that is an emergency. Do not wait to see if the symptoms will go away. Get medical help right away. Call your local emergency services (911 in the [place] Do not drive yourself to the hospital.   Summary         Influenza, also called \"the flu,\" is a viral infection that primarily affects your respiratory tract.       Symptoms of the flu usually begin suddenly and last 4?14 days.       Getting an annual flu shot is the best way to prevent " getting the flu.       Stay home from work or school as told by your health care provider. Unless you are visiting your health care provider, avoid leaving home until your fever has been gone for 24 hours without taking medicine.       Keep all follow-up visits. This is important.     This information is not intended to replace advice given to you by your health care provider. Make sure you discuss any questions you have with your health care provider.     Document Released: 12/15/2001Document Revised: 08/06/2021Document Reviewed: 08/06/2021     Elsevier Patient Education ? 2021 [employer]

## 2022-03-15 ENCOUNTER — PRE-ADMISSION TESTING (OUTPATIENT)
Dept: PREADMISSION TESTING | Facility: HOSPITAL | Age: 27
End: 2022-03-15

## 2022-03-15 ENCOUNTER — HOSPITAL ENCOUNTER (OUTPATIENT)
Dept: GENERAL RADIOLOGY | Facility: HOSPITAL | Age: 27
Discharge: HOME | End: 2022-03-15

## 2022-03-15 VITALS
OXYGEN SATURATION: 100 % | HEIGHT: 69 IN | WEIGHT: 263 LBS | RESPIRATION RATE: 20 BRPM | DIASTOLIC BLOOD PRESSURE: 78 MMHG | SYSTOLIC BLOOD PRESSURE: 112 MMHG | HEART RATE: 75 BPM | BODY MASS INDEX: 38.95 KG/M2 | TEMPERATURE: 97.2 F

## 2022-03-15 LAB
ALBUMIN SERPL-MCNC: 4.1 G/DL (ref 3.5–5.2)
ALBUMIN/GLOB SERPL: 1.4 G/DL
ALP SERPL-CCNC: 107 U/L (ref 39–117)
ALT SERPL W P-5'-P-CCNC: 79 U/L (ref 1–33)
ANION GAP SERPL CALCULATED.3IONS-SCNC: 10.6 MMOL/L (ref 5–15)
AST SERPL-CCNC: 40 U/L (ref 1–32)
BACTERIA UR QL AUTO: ABNORMAL /HPF
BASOPHILS # BLD AUTO: 0.02 10*3/MM3 (ref 0–0.2)
BASOPHILS NFR BLD AUTO: 0.3 % (ref 0–1.5)
BILIRUB SERPL-MCNC: 0.4 MG/DL (ref 0–1.2)
BILIRUB UR QL STRIP: NEGATIVE
BUN SERPL-MCNC: 12 MG/DL (ref 6–20)
BUN/CREAT SERPL: 17.6 (ref 7–25)
CALCIUM SPEC-SCNC: 8.9 MG/DL (ref 8.6–10.5)
CHLORIDE SERPL-SCNC: 106 MMOL/L (ref 98–107)
CLARITY UR: ABNORMAL
CO2 SERPL-SCNC: 23.4 MMOL/L (ref 22–29)
COLOR UR: YELLOW
CREAT SERPL-MCNC: 0.68 MG/DL (ref 0.57–1)
DEPRECATED RDW RBC AUTO: 38.6 FL (ref 37–54)
EGFRCR SERPLBLD CKD-EPI 2021: 121.8 ML/MIN/1.73
EOSINOPHIL # BLD AUTO: 0.07 10*3/MM3 (ref 0–0.4)
EOSINOPHIL NFR BLD AUTO: 1 % (ref 0.3–6.2)
ERYTHROCYTE [DISTWIDTH] IN BLOOD BY AUTOMATED COUNT: 12.5 % (ref 12.3–15.4)
GLOBULIN UR ELPH-MCNC: 2.9 GM/DL
GLUCOSE SERPL-MCNC: 105 MG/DL (ref 65–99)
GLUCOSE UR STRIP-MCNC: NEGATIVE MG/DL
HCT VFR BLD AUTO: 40.7 % (ref 34–46.6)
HGB BLD-MCNC: 13.2 G/DL (ref 12–15.9)
HGB UR QL STRIP.AUTO: NEGATIVE
HYALINE CASTS UR QL AUTO: ABNORMAL /LPF
IMM GRANULOCYTES # BLD AUTO: 0.01 10*3/MM3 (ref 0–0.05)
IMM GRANULOCYTES NFR BLD AUTO: 0.1 % (ref 0–0.5)
KETONES UR QL STRIP: NEGATIVE
LEUKOCYTE ESTERASE UR QL STRIP.AUTO: ABNORMAL
LYMPHOCYTES # BLD AUTO: 2.48 10*3/MM3 (ref 0.7–3.1)
LYMPHOCYTES NFR BLD AUTO: 36.8 % (ref 19.6–45.3)
MCH RBC QN AUTO: 27.8 PG (ref 26.6–33)
MCHC RBC AUTO-ENTMCNC: 32.4 G/DL (ref 31.5–35.7)
MCV RBC AUTO: 85.7 FL (ref 79–97)
MONOCYTES # BLD AUTO: 0.53 10*3/MM3 (ref 0.1–0.9)
MONOCYTES NFR BLD AUTO: 7.9 % (ref 5–12)
NEUTROPHILS NFR BLD AUTO: 3.63 10*3/MM3 (ref 1.7–7)
NEUTROPHILS NFR BLD AUTO: 53.9 % (ref 42.7–76)
NITRITE UR QL STRIP: NEGATIVE
NRBC BLD AUTO-RTO: 0 /100 WBC (ref 0–0.2)
PH UR STRIP.AUTO: 5.5 [PH] (ref 5–8)
PLATELET # BLD AUTO: 275 10*3/MM3 (ref 140–450)
PMV BLD AUTO: 9.3 FL (ref 6–12)
POTASSIUM SERPL-SCNC: 4.3 MMOL/L (ref 3.5–5.2)
PROT SERPL-MCNC: 7 G/DL (ref 6–8.5)
PROT UR QL STRIP: NEGATIVE
QT INTERVAL: 409 MS
RBC # BLD AUTO: 4.75 10*6/MM3 (ref 3.77–5.28)
RBC # UR STRIP: ABNORMAL /HPF
REF LAB TEST METHOD: ABNORMAL
SODIUM SERPL-SCNC: 140 MMOL/L (ref 136–145)
SP GR UR STRIP: 1.02 (ref 1–1.03)
SQUAMOUS #/AREA URNS HPF: ABNORMAL /HPF
UROBILINOGEN UR QL STRIP: ABNORMAL
WBC # UR STRIP: ABNORMAL /HPF
WBC NRBC COR # BLD: 6.74 10*3/MM3 (ref 3.4–10.8)

## 2022-03-15 PROCEDURE — 93005 ELECTROCARDIOGRAM TRACING: CPT

## 2022-03-15 PROCEDURE — 36415 COLL VENOUS BLD VENIPUNCTURE: CPT

## 2022-03-15 PROCEDURE — 93010 ELECTROCARDIOGRAM REPORT: CPT | Performed by: INTERNAL MEDICINE

## 2022-03-15 PROCEDURE — C9803 HOPD COVID-19 SPEC COLLECT: HCPCS

## 2022-03-15 PROCEDURE — 80053 COMPREHEN METABOLIC PANEL: CPT

## 2022-03-15 PROCEDURE — 81001 URINALYSIS AUTO W/SCOPE: CPT

## 2022-03-15 PROCEDURE — 85025 COMPLETE CBC W/AUTO DIFF WBC: CPT

## 2022-03-15 PROCEDURE — 71046 X-RAY EXAM CHEST 2 VIEWS: CPT

## 2022-03-15 RX ORDER — IBUPROFEN 600 MG/1
600 TABLET ORAL EVERY 6 HOURS PRN
COMMUNITY
End: 2022-06-09

## 2022-03-15 RX ORDER — ACETAMINOPHEN 500 MG
500 TABLET ORAL EVERY 6 HOURS PRN
COMMUNITY
End: 2022-06-09

## 2022-03-15 NOTE — DISCHARGE INSTRUCTIONS
Take the following medications the morning of surgery:    none    If you are on prescription narcotic pain medication to control your pain you may also take that medication the morning of surgery.    General Instructions:  Do not eat solid food after midnight the night before surgery.  You may drink clear liquids day of surgery but must stop at least one hour before your hospital arrival time.  It is beneficial for you to have a clear drink that contains carbohydrates the day of surgery.  We suggest a 12 to 20 ounce bottle of Gatorade or Powerade for non-diabetic patients or a 12 to 20 ounce bottle of G2 or Powerade Zero for diabetic patients. (Pediatric patients, are not advised to drink a 12 to 20 ounce carbohydrate drink)    Clear liquids are liquids you can see through.  Nothing red in color.     Plain water                               Sports drinks  Sodas                                   Gelatin (Jell-O)  Fruit juices without pulp such as white grape juice and apple juice  Popsicles that contain no fruit or yogurt  Tea or coffee (no cream or milk added)  Gatorade / Powerade  G2 / Powerade Zero    Infants may have breast milk up to four hours before surgery.  Infants drinking formula may drink formula up to six hours before surgery.   Patients who avoid smoking, chewing tobacco and alcohol for 4 weeks prior to surgery have a reduced risk of post-operative complications.  Quit smoking as many days before surgery as you can.  Do not smoke, use chewing tobacco or drink alcohol the day of surgery.   If applicable bring your C-PAP/ BI-PAP machine.  Bring any papers given to you in the doctor’s office.  Wear clean comfortable clothes.  Do not wear contact lenses, false eyelashes or make-up.  Bring a case for your glasses.   Bring crutches or walker if applicable.  Remove all piercings.  Leave jewelry and any other valuables at home.  Hair extensions with metal clips must be removed prior to surgery.  The  Pre-Admission Testing nurse will instruct you to bring medications if unable to obtain an accurate list in Pre-Admission Testing.        If you were given a blood bank ID arm band remember to bring it with you the day of surgery.    Preventing a Surgical Site Infection:  For 2 to 3 days before surgery, avoid shaving with a razor because the razor can irritate skin and make it easier to develop an infection.    Any areas of open skin can increase the risk of a post-operative wound infection by allowing bacteria to enter and travel throughout the body.  Notify your surgeon if you have any skin wounds / rashes even if it is not near the expected surgical site.  The area will need assessed to determine if surgery should be delayed until it is healed.  The night prior to surgery shower using a fresh bar of anti-bacterial soap (such as Dial) and clean washcloth.  Sleep in a clean bed with clean clothing.  Do not allow pets to sleep with you.  Shower on the morning of surgery using a fresh bar of anti-bacterial soap (such as Dial) and clean washcloth.  Dry with a clean towel and dress in clean clothing.  Ask your surgeon if you will be receiving antibiotics prior to surgery.  Make sure you, your family, and all healthcare providers clean their hands with soap and water or an alcohol based hand  before caring for you or your wound.    Day of surgery:3/29/2022 hospital to inform pt when to arrive for surgery day before in afternoon  Your arrival time is approximately two hours before your scheduled surgery time.  Upon arrival, a Pre-op nurse and Anesthesiologist will review your health history, obtain vital signs, and answer questions you may have.  The only belongings needed at this time will be a list of your home medications and if applicable your C-PAP/BI-PAP machine.  A Pre-op nurse will start an IV and you may receive medication in preparation for surgery, including something to help you relax.     Please be  aware that surgery does come with discomfort.  We want to make every effort to control your discomfort so please discuss any uncontrolled symptoms with your nurse.   Your doctor will most likely have prescribed pain medications.      If you are going home after surgery you will receive individualized written care instructions before being discharged.  A responsible adult must drive you to and from the hospital on the day of your surgery and stay with you for 24 hours.  Discharge prescriptions can be filled by the hospital pharmacy during regular pharmacy hours.  If you are having surgery late in the day/evening your prescription may be e-prescribed to your pharmacy.  Please verify your pharmacy hours or chose a 24 hour pharmacy to avoid not having access to your prescription because your pharmacy has closed for the day.    If you are staying overnight following surgery, you will be transported to your hospital room following the recovery period.  [facility] has all private rooms.    If you have any questions please call Pre-Admission Testing at [phone].  Deductibles and co-payments are collected on the day of service. Please be prepared to pay the required co-pay, deductible or deposit on the day of service as defined by your plan.    Patient Education for Self-Quarantine Process    Following your COVID testing, we strongly recommend that you wear a mask when you are with other people and practice social distancing.   Limit your activities to only required outings.  Wash your hands with soap and water frequently for at least 20 seconds.   Avoid touching your eyes, nose and mouth with unwashed hands.  Do not share anything - utensils, drinking glasses, food from the same bowl.   Sanitize household surfaces daily. Include all high touch areas (door handles, light switches, phones, countertops, etc.)    Call your surgeon immediately if you experience any of the following symptoms:  Sore  Throat  Shortness of Breath or difficulty breathing  Cough  Chills  Body soreness or muscle pain  Headache  Fever  New loss of taste or smell  Do not arrive for your surgery ill.  Your procedure will need to be rescheduled to another time.  You will need to call your physician before the day of surgery to avoid any unnecessary exposure to hospital staff as well as other patients.

## 2022-03-26 ENCOUNTER — LAB (OUTPATIENT)
Dept: LAB | Facility: HOSPITAL | Age: 27
End: 2022-03-26

## 2022-03-26 LAB — SARS-COV-2 ORF1AB RESP QL NAA+PROBE: NOT DETECTED

## 2022-03-26 PROCEDURE — U0004 COV-19 TEST NON-CDC HGH THRU: HCPCS

## 2022-03-26 PROCEDURE — C9803 HOPD COVID-19 SPEC COLLECT: HCPCS

## 2022-03-29 ENCOUNTER — ANESTHESIA EVENT (OUTPATIENT)
Dept: PERIOP | Facility: HOSPITAL | Age: 27
End: 2022-03-29

## 2022-03-29 ENCOUNTER — ANESTHESIA (OUTPATIENT)
Dept: PERIOP | Facility: HOSPITAL | Age: 27
End: 2022-03-29

## 2022-03-29 ENCOUNTER — HOSPITAL ENCOUNTER (OUTPATIENT)
Facility: HOSPITAL | Age: 27
Setting detail: HOSPITAL OUTPATIENT SURGERY
Discharge: HOME | End: 2022-03-29
Attending: ORTHOPAEDIC SURGERY | Admitting: ORTHOPAEDIC SURGERY

## 2022-03-29 VITALS
DIASTOLIC BLOOD PRESSURE: 75 MMHG | HEART RATE: 59 BPM | RESPIRATION RATE: 16 BRPM | OXYGEN SATURATION: 97 % | TEMPERATURE: 97.5 F | SYSTOLIC BLOOD PRESSURE: 120 MMHG

## 2022-03-29 DIAGNOSIS — G89.29 CHRONIC LEFT SHOULDER PAIN: Primary | ICD-10-CM

## 2022-03-29 DIAGNOSIS — M25.512 CHRONIC LEFT SHOULDER PAIN: Primary | ICD-10-CM

## 2022-03-29 LAB
B-HCG UR QL: NEGATIVE
EXPIRATION DATE: ABNORMAL
INTERNAL NEGATIVE CONTROL: NEGATIVE
INTERNAL POSITIVE CONTROL: POSITIVE
Lab: ABNORMAL

## 2022-03-29 PROCEDURE — 25010000002 DEXAMETHASONE PER 1 MG: Performed by: NURSE ANESTHETIST, CERTIFIED REGISTERED

## 2022-03-29 PROCEDURE — 25010000002 FENTANYL CITRATE (PF) 50 MCG/ML SOLUTION: Performed by: NURSE ANESTHETIST, CERTIFIED REGISTERED

## 2022-03-29 PROCEDURE — 25010000002 ROPIVACAINE PER 1 MG: Performed by: ANESTHESIOLOGY

## 2022-03-29 PROCEDURE — 25010000002 NEOSTIGMINE 5 MG/10ML SOLUTION: Performed by: NURSE ANESTHETIST, CERTIFIED REGISTERED

## 2022-03-29 PROCEDURE — C1713 ANCHOR/SCREW BN/BN,TIS/BN: HCPCS | Performed by: ORTHOPAEDIC SURGERY

## 2022-03-29 PROCEDURE — 76942 ECHO GUIDE FOR BIOPSY: CPT | Performed by: ORTHOPAEDIC SURGERY

## 2022-03-29 PROCEDURE — 25010000002 FENTANYL CITRATE (PF) 50 MCG/ML SOLUTION: Performed by: ANESTHESIOLOGY

## 2022-03-29 PROCEDURE — 25010000002 MIDAZOLAM PER 1 MG: Performed by: ANESTHESIOLOGY

## 2022-03-29 PROCEDURE — 25010000002 CEFAZOLIN IN DEXTROSE 2-4 GM/100ML-% SOLUTION: Performed by: ORTHOPAEDIC SURGERY

## 2022-03-29 PROCEDURE — 25010000002 ONDANSETRON PER 1 MG: Performed by: NURSE ANESTHETIST, CERTIFIED REGISTERED

## 2022-03-29 PROCEDURE — 25010000002 EPINEPHRINE PER 0.1 MG: Performed by: ORTHOPAEDIC SURGERY

## 2022-03-29 PROCEDURE — 25010000002 CEFAZOLIN 1-4 GM/50ML-% SOLUTION: Performed by: NURSE ANESTHETIST, CERTIFIED REGISTERED

## 2022-03-29 PROCEDURE — 25010000002 DEXAMETHASONE PER 1 MG: Performed by: ANESTHESIOLOGY

## 2022-03-29 PROCEDURE — 25010000002 PROPOFOL 10 MG/ML EMULSION: Performed by: NURSE ANESTHETIST, CERTIFIED REGISTERED

## 2022-03-29 PROCEDURE — 81025 URINE PREGNANCY TEST: CPT | Performed by: ANESTHESIOLOGY

## 2022-03-29 DEVICE — IMPLANTABLE DEVICE
Type: IMPLANTABLE DEVICE | Site: SHOULDER | Status: FUNCTIONAL
Brand: JUGGERKNOT SOFT ANCHORS

## 2022-03-29 RX ORDER — NALOXONE HCL 0.4 MG/ML
0.2 VIAL (ML) INJECTION AS NEEDED
Status: DISCONTINUED | OUTPATIENT
Start: 2022-03-29 | End: 2022-03-29 | Stop reason: HOSPADM

## 2022-03-29 RX ORDER — IBUPROFEN 600 MG/1
600 TABLET ORAL ONCE AS NEEDED
Status: DISCONTINUED | OUTPATIENT
Start: 2022-03-29 | End: 2022-03-29 | Stop reason: HOSPADM

## 2022-03-29 RX ORDER — ONDANSETRON 2 MG/ML
4 INJECTION INTRAMUSCULAR; INTRAVENOUS ONCE AS NEEDED
Status: DISCONTINUED | OUTPATIENT
Start: 2022-03-29 | End: 2022-03-29 | Stop reason: HOSPADM

## 2022-03-29 RX ORDER — ROCURONIUM BROMIDE 10 MG/ML
INJECTION, SOLUTION INTRAVENOUS AS NEEDED
Status: DISCONTINUED | OUTPATIENT
Start: 2022-03-29 | End: 2022-03-29 | Stop reason: SURG

## 2022-03-29 RX ORDER — DEXAMETHASONE SODIUM PHOSPHATE 10 MG/ML
INJECTION INTRAMUSCULAR; INTRAVENOUS AS NEEDED
Status: DISCONTINUED | OUTPATIENT
Start: 2022-03-29 | End: 2022-03-29 | Stop reason: SURG

## 2022-03-29 RX ORDER — DIPHENHYDRAMINE HCL 25 MG
25 CAPSULE ORAL
Status: DISCONTINUED | OUTPATIENT
Start: 2022-03-29 | End: 2022-03-29 | Stop reason: HOSPADM

## 2022-03-29 RX ORDER — ROPIVACAINE HYDROCHLORIDE 5 MG/ML
INJECTION, SOLUTION EPIDURAL; INFILTRATION; PERINEURAL
Status: COMPLETED | OUTPATIENT
Start: 2022-03-29 | End: 2022-03-29

## 2022-03-29 RX ORDER — FENTANYL CITRATE 50 UG/ML
50 INJECTION, SOLUTION INTRAMUSCULAR; INTRAVENOUS
Status: DISCONTINUED | OUTPATIENT
Start: 2022-03-29 | End: 2022-03-29 | Stop reason: HOSPADM

## 2022-03-29 RX ORDER — PROMETHAZINE HYDROCHLORIDE 25 MG/1
25 TABLET ORAL ONCE AS NEEDED
Status: DISCONTINUED | OUTPATIENT
Start: 2022-03-29 | End: 2022-03-29 | Stop reason: HOSPADM

## 2022-03-29 RX ORDER — OXYCODONE AND ACETAMINOPHEN 7.5; 325 MG/1; MG/1
1 TABLET ORAL EVERY 4 HOURS PRN
Status: DISCONTINUED | OUTPATIENT
Start: 2022-03-29 | End: 2022-03-29 | Stop reason: HOSPADM

## 2022-03-29 RX ORDER — EPHEDRINE SULFATE 50 MG/ML
5 INJECTION, SOLUTION INTRAVENOUS ONCE AS NEEDED
Status: DISCONTINUED | OUTPATIENT
Start: 2022-03-29 | End: 2022-03-29 | Stop reason: HOSPADM

## 2022-03-29 RX ORDER — ZOLPIDEM TARTRATE 5 MG/1
5 TABLET ORAL NIGHTLY PRN
Qty: 14 TABLET | Refills: 0 | Status: SHIPPED | OUTPATIENT
Start: 2022-03-29 | End: 2022-06-09

## 2022-03-29 RX ORDER — LABETALOL HYDROCHLORIDE 5 MG/ML
5 INJECTION, SOLUTION INTRAVENOUS
Status: DISCONTINUED | OUTPATIENT
Start: 2022-03-29 | End: 2022-03-29 | Stop reason: HOSPADM

## 2022-03-29 RX ORDER — MIDAZOLAM HYDROCHLORIDE 1 MG/ML
1 INJECTION INTRAMUSCULAR; INTRAVENOUS
Status: DISCONTINUED | OUTPATIENT
Start: 2022-03-29 | End: 2022-03-29 | Stop reason: HOSPADM

## 2022-03-29 RX ORDER — SODIUM CHLORIDE 0.9 % (FLUSH) 0.9 %
3-10 SYRINGE (ML) INJECTION AS NEEDED
Status: DISCONTINUED | OUTPATIENT
Start: 2022-03-29 | End: 2022-03-29 | Stop reason: HOSPADM

## 2022-03-29 RX ORDER — OXYCODONE AND ACETAMINOPHEN 7.5; 325 MG/1; MG/1
1 TABLET ORAL EVERY 6 HOURS PRN
Qty: 30 TABLET | Refills: 0 | Status: SHIPPED | OUTPATIENT
Start: 2022-03-29 | End: 2022-06-09

## 2022-03-29 RX ORDER — FENTANYL CITRATE 50 UG/ML
INJECTION, SOLUTION INTRAMUSCULAR; INTRAVENOUS AS NEEDED
Status: DISCONTINUED | OUTPATIENT
Start: 2022-03-29 | End: 2022-03-29 | Stop reason: SURG

## 2022-03-29 RX ORDER — SODIUM CHLORIDE 0.9 % (FLUSH) 0.9 %
3 SYRINGE (ML) INJECTION EVERY 12 HOURS SCHEDULED
Status: DISCONTINUED | OUTPATIENT
Start: 2022-03-29 | End: 2022-03-29 | Stop reason: HOSPADM

## 2022-03-29 RX ORDER — CEFAZOLIN SODIUM 2 G/100ML
2 INJECTION, SOLUTION INTRAVENOUS ONCE
Status: DISCONTINUED | OUTPATIENT
Start: 2022-03-29 | End: 2022-03-29 | Stop reason: HOSPADM

## 2022-03-29 RX ORDER — FLUMAZENIL 0.1 MG/ML
0.2 INJECTION INTRAVENOUS AS NEEDED
Status: DISCONTINUED | OUTPATIENT
Start: 2022-03-29 | End: 2022-03-29 | Stop reason: HOSPADM

## 2022-03-29 RX ORDER — SODIUM CHLORIDE, SODIUM LACTATE, POTASSIUM CHLORIDE, AND CALCIUM CHLORIDE .6; .31; .03; .02 G/100ML; G/100ML; G/100ML; G/100ML
IRRIGANT IRRIGATION AS NEEDED
Status: DISCONTINUED | OUTPATIENT
Start: 2022-03-29 | End: 2022-03-29 | Stop reason: HOSPADM

## 2022-03-29 RX ORDER — GLYCOPYRROLATE 0.2 MG/ML
INJECTION INTRAMUSCULAR; INTRAVENOUS AS NEEDED
Status: DISCONTINUED | OUTPATIENT
Start: 2022-03-29 | End: 2022-03-29 | Stop reason: SURG

## 2022-03-29 RX ORDER — HYDRALAZINE HYDROCHLORIDE 20 MG/ML
5 INJECTION INTRAMUSCULAR; INTRAVENOUS
Status: DISCONTINUED | OUTPATIENT
Start: 2022-03-29 | End: 2022-03-29 | Stop reason: HOSPADM

## 2022-03-29 RX ORDER — NEOSTIGMINE METHYLSULFATE 0.5 MG/ML
INJECTION, SOLUTION INTRAVENOUS AS NEEDED
Status: DISCONTINUED | OUTPATIENT
Start: 2022-03-29 | End: 2022-03-29 | Stop reason: SURG

## 2022-03-29 RX ORDER — HYDROMORPHONE HYDROCHLORIDE 1 MG/ML
0.5 INJECTION, SOLUTION INTRAMUSCULAR; INTRAVENOUS; SUBCUTANEOUS
Status: DISCONTINUED | OUTPATIENT
Start: 2022-03-29 | End: 2022-03-29 | Stop reason: HOSPADM

## 2022-03-29 RX ORDER — DEXAMETHASONE SODIUM PHOSPHATE 4 MG/ML
INJECTION, SOLUTION INTRA-ARTICULAR; INTRALESIONAL; INTRAMUSCULAR; INTRAVENOUS; SOFT TISSUE
Status: COMPLETED | OUTPATIENT
Start: 2022-03-29 | End: 2022-03-29

## 2022-03-29 RX ORDER — PROMETHAZINE HYDROCHLORIDE 25 MG/1
25 SUPPOSITORY RECTAL ONCE AS NEEDED
Status: DISCONTINUED | OUTPATIENT
Start: 2022-03-29 | End: 2022-03-29 | Stop reason: HOSPADM

## 2022-03-29 RX ORDER — LIDOCAINE HYDROCHLORIDE 20 MG/ML
INJECTION, SOLUTION INFILTRATION; PERINEURAL AS NEEDED
Status: DISCONTINUED | OUTPATIENT
Start: 2022-03-29 | End: 2022-03-29 | Stop reason: SURG

## 2022-03-29 RX ORDER — SODIUM CHLORIDE, SODIUM LACTATE, POTASSIUM CHLORIDE, CALCIUM CHLORIDE 600; 310; 30; 20 MG/100ML; MG/100ML; MG/100ML; MG/100ML
9 INJECTION, SOLUTION INTRAVENOUS CONTINUOUS
Status: DISCONTINUED | OUTPATIENT
Start: 2022-03-29 | End: 2022-03-29 | Stop reason: HOSPADM

## 2022-03-29 RX ORDER — BUPIVACAINE HYDROCHLORIDE AND EPINEPHRINE 5; 5 MG/ML; UG/ML
INJECTION, SOLUTION EPIDURAL; INTRACAUDAL; PERINEURAL AS NEEDED
Status: DISCONTINUED | OUTPATIENT
Start: 2022-03-29 | End: 2022-03-29 | Stop reason: HOSPADM

## 2022-03-29 RX ORDER — PROPOFOL 10 MG/ML
VIAL (ML) INTRAVENOUS AS NEEDED
Status: DISCONTINUED | OUTPATIENT
Start: 2022-03-29 | End: 2022-03-29 | Stop reason: SURG

## 2022-03-29 RX ORDER — LIDOCAINE HYDROCHLORIDE 10 MG/ML
0.5 INJECTION, SOLUTION EPIDURAL; INFILTRATION; INTRACAUDAL; PERINEURAL ONCE AS NEEDED
Status: DISCONTINUED | OUTPATIENT
Start: 2022-03-29 | End: 2022-03-29 | Stop reason: HOSPADM

## 2022-03-29 RX ORDER — DIPHENHYDRAMINE HYDROCHLORIDE 50 MG/ML
12.5 INJECTION INTRAMUSCULAR; INTRAVENOUS
Status: DISCONTINUED | OUTPATIENT
Start: 2022-03-29 | End: 2022-03-29 | Stop reason: HOSPADM

## 2022-03-29 RX ORDER — ONDANSETRON 2 MG/ML
INJECTION INTRAMUSCULAR; INTRAVENOUS AS NEEDED
Status: DISCONTINUED | OUTPATIENT
Start: 2022-03-29 | End: 2022-03-29 | Stop reason: SURG

## 2022-03-29 RX ORDER — FAMOTIDINE 10 MG/ML
20 INJECTION, SOLUTION INTRAVENOUS ONCE
Status: COMPLETED | OUTPATIENT
Start: 2022-03-29 | End: 2022-03-29

## 2022-03-29 RX ORDER — ONDANSETRON 4 MG/1
4 TABLET, FILM COATED ORAL EVERY 8 HOURS PRN
Qty: 30 TABLET | Refills: 0 | Status: SHIPPED | OUTPATIENT
Start: 2022-03-29 | End: 2022-06-09

## 2022-03-29 RX ORDER — SODIUM CHLORIDE, SODIUM LACTATE, POTASSIUM CHLORIDE, CALCIUM CHLORIDE 600; 310; 30; 20 MG/100ML; MG/100ML; MG/100ML; MG/100ML
100 INJECTION, SOLUTION INTRAVENOUS CONTINUOUS
Status: DISCONTINUED | OUTPATIENT
Start: 2022-03-29 | End: 2022-03-29 | Stop reason: HOSPADM

## 2022-03-29 RX ORDER — HYDROCODONE BITARTRATE AND ACETAMINOPHEN 7.5; 325 MG/1; MG/1
1 TABLET ORAL ONCE AS NEEDED
Status: DISCONTINUED | OUTPATIENT
Start: 2022-03-29 | End: 2022-03-29 | Stop reason: HOSPADM

## 2022-03-29 RX ORDER — CEFAZOLIN SODIUM 1 G/50ML
INJECTION, SOLUTION INTRAVENOUS AS NEEDED
Status: DISCONTINUED | OUTPATIENT
Start: 2022-03-29 | End: 2022-03-29 | Stop reason: SURG

## 2022-03-29 RX ADMIN — FENTANYL CITRATE 100 MCG: 50 INJECTION INTRAMUSCULAR; INTRAVENOUS at 09:24

## 2022-03-29 RX ADMIN — ROCURONIUM BROMIDE 50 MG: 50 INJECTION INTRAVENOUS at 09:24

## 2022-03-29 RX ADMIN — LIDOCAINE HYDROCHLORIDE 100 MG: 20 INJECTION, SOLUTION INFILTRATION; PERINEURAL at 09:24

## 2022-03-29 RX ADMIN — GLYCOPYRROLATE 0.4 MG: 0.2 INJECTION INTRAMUSCULAR; INTRAVENOUS at 10:17

## 2022-03-29 RX ADMIN — CEFAZOLIN SODIUM 1 G: 1 INJECTION, SOLUTION INTRAVENOUS at 09:13

## 2022-03-29 RX ADMIN — NEOSTIGMINE METHYLSULFATE 3 MG: 0.5 INJECTION INTRAVENOUS at 10:17

## 2022-03-29 RX ADMIN — DEXAMETHASONE SODIUM PHOSPHATE 4 MG: 4 INJECTION, SOLUTION INTRAMUSCULAR; INTRAVENOUS at 08:35

## 2022-03-29 RX ADMIN — SODIUM CHLORIDE, POTASSIUM CHLORIDE, SODIUM LACTATE AND CALCIUM CHLORIDE 9 ML/HR: 600; 310; 30; 20 INJECTION, SOLUTION INTRAVENOUS at 08:18

## 2022-03-29 RX ADMIN — DEXAMETHASONE SODIUM PHOSPHATE 10 MG: 10 INJECTION INTRAMUSCULAR; INTRAVENOUS at 09:28

## 2022-03-29 RX ADMIN — FENTANYL CITRATE 50 MCG: 50 INJECTION, SOLUTION INTRAMUSCULAR; INTRAVENOUS at 08:32

## 2022-03-29 RX ADMIN — PROPOFOL 200 MG: 10 INJECTION, EMULSION INTRAVENOUS at 09:24

## 2022-03-29 RX ADMIN — FAMOTIDINE 20 MG: 10 INJECTION INTRAVENOUS at 08:18

## 2022-03-29 RX ADMIN — PROPOFOL 50 MG: 10 INJECTION, EMULSION INTRAVENOUS at 09:25

## 2022-03-29 RX ADMIN — ONDANSETRON 4 MG: 2 INJECTION INTRAMUSCULAR; INTRAVENOUS at 10:17

## 2022-03-29 RX ADMIN — PROPOFOL 25 MCG/KG/MIN: 10 INJECTION, EMULSION INTRAVENOUS at 09:30

## 2022-03-29 RX ADMIN — ROPIVACAINE HYDROCHLORIDE 30 ML: 5 INJECTION EPIDURAL; INFILTRATION; PERINEURAL at 08:35

## 2022-03-29 RX ADMIN — MIDAZOLAM 1 MG: 1 INJECTION INTRAMUSCULAR; INTRAVENOUS at 08:32

## 2022-03-29 NOTE — ANESTHESIA POSTPROCEDURE EVALUATION
Patient: Davida Quinonez    Procedure Summary     Date: 03/29/22 Room / Location:  [facility]  [facility]    Anesthesia Start: 0913 Anesthesia Stop: 1034    Procedure: LEFT SHOULDER ARTHROSCOPY LBARAL REPAIR WITH DISTAL CLAVICLE EXCISION AND SUBACROMIAL DECOMPRESSION (Left Shoulder) Diagnosis:     Surgeons: Navid Hinds MD Provider: Angeles Koroma MD    Anesthesia Type: general ASA Status: 2          Anesthesia Type: general    Vitals  Vitals Value Taken Time   /77 03/29/22 1101   Temp 36.4 °C (97.5 °F) 03/29/22 1100   Pulse 57 03/29/22 1111   Resp 16 03/29/22 1100   SpO2 99 % 03/29/22 1111   Vitals shown include unvalidated device data.        Post Anesthesia Care and Evaluation    Patient location during evaluation: bedside  Patient participation: complete - patient participated  Level of consciousness: awake  Pain management: adequate  Airway patency: patent  Anesthetic complications: No anesthetic complications  PONV Status: controlled  Cardiovascular status: acceptable  Respiratory status: acceptable  Hydration status: acceptable    Comments: --------------------            03/29/22               1136     --------------------   BP:       120/75     Pulse:      59       Resp:       16       Temp:                SpO2:      97%      --------------------

## 2022-03-29 NOTE — ANESTHESIA PROCEDURE NOTES
Interscalene Nerve Block      Patient reassessed immediately prior to procedure    Patient location during procedure: pre-op  Start time: 3/29/2022 8:34 AM  Stop time: 3/29/2022 8:39 AM  Reason for block: at surgeon's request and post-op pain management  Performed by  Anesthesiologist: Angeles Koroma MD  Preanesthetic Checklist  Completed: patient identified, IV checked, site marked, risks and benefits discussed, surgical consent, monitors and equipment checked, pre-op evaluation and timeout performed  Prep:  Pt Position: sitting  Sterile barriers:cap, gloves and mask  Prep: ChloraPrep  Patient monitoring: blood pressure monitoring, continuous pulse oximetry and EKG  Procedure    Sedation: yes  Performed under: local infiltration  Guidance:ultrasound guided    ULTRASOUND INTERPRETATION.  Using ultrasound guidance a 22 G gauge needle was placed in close proximity to the brachial plexus nerve, at which point, under ultrasound guidance anesthetic was injected in the area of the nerve and spread of the anesthesia was seen on ultrasound in close proximity thereto.  There were no abnormalities seen on ultrasound; a digital image was taken; and the patient tolerated the procedure with no complications. Images:still images obtained, printed/placed on chart    Laterality:left  Block Type:interscalene  Injection Technique:single-shot  Needle Type:short-bevel and echogenic  Needle Gauge:22 G  Resistance on Injection: none    Medications Used: dexamethasone (DECADRON) injection, 4 mg  ropivacaine (NAROPIN) 0.5 % injection, 30 mL  Med administered at 3/29/2022 8:35 AM      Medications  Comment:Ultrasound Interpretation:  Using ultrasound guidance the needle was placed in close proximity to the target nerve and anesthetic was injected in the area of the target nerve and/or bundles, and spread of the anesthetic was seen on ultrasound in close proximity thereto.  There were no abnormalities seen on ultrasound; a digital  image was taken; and the patient tolerated the procedure with no complications.    Block placed for postoperative pain control per surgeon request.    Post Assessment  Injection Assessment: negative aspiration for heme, no paresthesia on injection and incremental injection  Patient Tolerance:comfortable throughout block  Complications:no

## 2022-03-29 NOTE — ANESTHESIA PROCEDURE NOTES
Airway  Urgency: elective    Date/Time: 3/29/2022 9:26 AM  Airway not difficult    General Information and Staff    Patient location during procedure: OR  Anesthesiologist: Angeles Koroma MD  CRNA: Isabela Ramirez CRNA    Indications and Patient Condition  Indications for airway management: airway protection    Preoxygenated: yes  MILS not maintained throughout  Mask difficulty assessment: 1 - vent by mask    Final Airway Details  Final airway type: endotracheal airway      Successful airway: ETT  Cuffed: yes   Successful intubation technique: direct laryngoscopy  Facilitating devices/methods: intubating stylet  Endotracheal tube insertion site: oral  Blade: Vero  Blade size: 3  ETT size (mm): 7.0  Cormack-Lehane Classification: grade I - full view of glottis  Placement verified by: chest auscultation   Cuff volume (mL): 8  Measured from: lips  Number of attempts at approach: 1  Assessment: lips, teeth, and gum same as pre-op and atraumatic intubation    Additional Comments  PreO2 100% face mask, IV induction, easy mask, DVL x1, cords noted, tube through, cuff up, EBBSH, +etCO2, = chest movement, tube secured in place, atraumatic, teeth and lips intact as preop.

## 2022-03-29 NOTE — ANESTHESIA PREPROCEDURE EVALUATION
Anesthesia Evaluation     Patient summary reviewed and Nursing notes reviewed   no history of anesthetic complications:  NPO Solid Status: > 8 hours  NPO Liquid Status: > 2 hours           Airway   Mallampati: II  TM distance: >3 FB  Neck ROM: full  Possible difficult intubation and Large neck circumference  Dental      Pulmonary - normal exam   (+) sleep apnea (noncompliant with CPAP),   Cardiovascular - normal exam  Exercise tolerance: good (4-7 METS)        Neuro/Psych  (+) psychiatric history Anxiety, Depression and ADHD,    GI/Hepatic/Renal/Endo    (+) obesity,       Musculoskeletal     Abdominal   (+) obese,    Substance History      OB/GYN          Other   arthritis,                    Anesthesia Plan    ASA 2     general   (With PNB for POPC    I have reviewed the patient's history and chart with the patient, including all pertinent laboratory results and imaging. I have explained the risks of anesthesia including but not limited to dental damage, corneal abrasion, nerve injury, MI, stroke, aspiration, and death. Patient has agreed to proceed.     Risks of peripheral nerve block were discussed with patient including but not limited to: inadequate block, bleeding, infection, persistent numbness or weakness, nerve damage, painful dysasthesia and need to protect limb while numb.  )  intravenous induction     Anesthetic plan, all risks, benefits, and alternatives have been provided, discussed and informed consent has been obtained with: patient.        CODE STATUS:

## 2022-04-07 ENCOUNTER — TELEPHONE (OUTPATIENT)
Dept: FAMILY MEDICINE CLINIC | Facility: CLINIC | Age: 27
End: 2022-04-07

## 2022-04-07 DIAGNOSIS — R35.0 FREQUENCY OF URINATION: Primary | ICD-10-CM

## 2022-04-07 NOTE — TELEPHONE ENCOUNTER
Caller: Davida Quinonez    Relationship: Self    Best call back number: [phone]    What was the call regarding: PATIENT STARTED TAKING MEDICATION FOR A UTI ON 3/15. SHE TOOK 10 OF THE 14 DAYS OF MEDICATION. YESTERDAY SHE STARTED FEELING BURNING WHEN SHE URINATED. PLEASE ADVISE IF SHE SHOULD GET TESTED AGAIN OR IF [facility] CAN SEND IN ANOTHER ROUND OF ANTIBIOTICS. SHE GOT THE FIRST ROUND FROM THE HOSPITAL.     [facility] - [place] [address] - [phone]  - [phone]      Do you require a callback: YES

## 2022-04-11 ENCOUNTER — LAB (OUTPATIENT)
Dept: FAMILY MEDICINE CLINIC | Facility: CLINIC | Age: 27
End: 2022-04-11

## 2022-06-09 ENCOUNTER — PRE-ADMISSION TESTING (OUTPATIENT)
Dept: PREADMISSION TESTING | Facility: HOSPITAL | Age: 27
End: 2022-06-09

## 2022-06-09 VITALS
OXYGEN SATURATION: 100 % | WEIGHT: 276.7 LBS | TEMPERATURE: 98.1 F | RESPIRATION RATE: 16 BRPM | SYSTOLIC BLOOD PRESSURE: 113 MMHG | HEIGHT: 69 IN | DIASTOLIC BLOOD PRESSURE: 80 MMHG | HEART RATE: 74 BPM | BODY MASS INDEX: 40.98 KG/M2

## 2022-06-09 LAB
ALBUMIN SERPL-MCNC: 4.3 G/DL (ref 3.5–5.2)
ALBUMIN/GLOB SERPL: 1.9 G/DL
ALP SERPL-CCNC: 88 U/L (ref 39–117)
ALT SERPL W P-5'-P-CCNC: 26 U/L (ref 1–33)
ANION GAP SERPL CALCULATED.3IONS-SCNC: 12.9 MMOL/L (ref 5–15)
AST SERPL-CCNC: 17 U/L (ref 1–32)
BACTERIA UR QL AUTO: ABNORMAL /HPF
BASOPHILS # BLD AUTO: 0.03 10*3/MM3 (ref 0–0.2)
BASOPHILS NFR BLD AUTO: 0.6 % (ref 0–1.5)
BILIRUB SERPL-MCNC: 0.5 MG/DL (ref 0–1.2)
BILIRUB UR QL STRIP: NEGATIVE
BUN SERPL-MCNC: 11 MG/DL (ref 6–20)
BUN/CREAT SERPL: 15.5 (ref 7–25)
CALCIUM SPEC-SCNC: 8.9 MG/DL (ref 8.6–10.5)
CHLORIDE SERPL-SCNC: 104 MMOL/L (ref 98–107)
CLARITY UR: CLEAR
CO2 SERPL-SCNC: 21.1 MMOL/L (ref 22–29)
COLOR UR: YELLOW
CREAT SERPL-MCNC: 0.71 MG/DL (ref 0.57–1)
DEPRECATED RDW RBC AUTO: 38.7 FL (ref 37–54)
EGFRCR SERPLBLD CKD-EPI 2021: 118.9 ML/MIN/1.73
EOSINOPHIL # BLD AUTO: 0.09 10*3/MM3 (ref 0–0.4)
EOSINOPHIL NFR BLD AUTO: 1.7 % (ref 0.3–6.2)
ERYTHROCYTE [DISTWIDTH] IN BLOOD BY AUTOMATED COUNT: 12.9 % (ref 12.3–15.4)
GLOBULIN UR ELPH-MCNC: 2.3 GM/DL
GLUCOSE SERPL-MCNC: 107 MG/DL (ref 65–99)
GLUCOSE UR STRIP-MCNC: NEGATIVE MG/DL
HCG SERPL QL: NEGATIVE
HCT VFR BLD AUTO: 39.7 % (ref 34–46.6)
HGB BLD-MCNC: 13.3 G/DL (ref 12–15.9)
HGB UR QL STRIP.AUTO: NEGATIVE
HYALINE CASTS UR QL AUTO: ABNORMAL /LPF
IMM GRANULOCYTES # BLD AUTO: 0.01 10*3/MM3 (ref 0–0.05)
IMM GRANULOCYTES NFR BLD AUTO: 0.2 % (ref 0–0.5)
KETONES UR QL STRIP: NEGATIVE
LEUKOCYTE ESTERASE UR QL STRIP.AUTO: ABNORMAL
LYMPHOCYTES # BLD AUTO: 2.09 10*3/MM3 (ref 0.7–3.1)
LYMPHOCYTES NFR BLD AUTO: 40.2 % (ref 19.6–45.3)
MCH RBC QN AUTO: 28.1 PG (ref 26.6–33)
MCHC RBC AUTO-ENTMCNC: 33.5 G/DL (ref 31.5–35.7)
MCV RBC AUTO: 83.9 FL (ref 79–97)
MONOCYTES # BLD AUTO: 0.47 10*3/MM3 (ref 0.1–0.9)
MONOCYTES NFR BLD AUTO: 9 % (ref 5–12)
NEUTROPHILS NFR BLD AUTO: 2.51 10*3/MM3 (ref 1.7–7)
NEUTROPHILS NFR BLD AUTO: 48.3 % (ref 42.7–76)
NITRITE UR QL STRIP: NEGATIVE
NRBC BLD AUTO-RTO: 0 /100 WBC (ref 0–0.2)
PH UR STRIP.AUTO: 6 [PH] (ref 5–8)
PLATELET # BLD AUTO: 283 10*3/MM3 (ref 140–450)
PMV BLD AUTO: 9.1 FL (ref 6–12)
POTASSIUM SERPL-SCNC: 3.9 MMOL/L (ref 3.5–5.2)
PROT SERPL-MCNC: 6.6 G/DL (ref 6–8.5)
PROT UR QL STRIP: NEGATIVE
RBC # BLD AUTO: 4.73 10*6/MM3 (ref 3.77–5.28)
RBC # UR STRIP: ABNORMAL /HPF
REF LAB TEST METHOD: ABNORMAL
SODIUM SERPL-SCNC: 138 MMOL/L (ref 136–145)
SP GR UR STRIP: 1.02 (ref 1–1.03)
SQUAMOUS #/AREA URNS HPF: ABNORMAL /HPF
UROBILINOGEN UR QL STRIP: ABNORMAL
WBC # UR STRIP: ABNORMAL /HPF
WBC NRBC COR # BLD: 5.2 10*3/MM3 (ref 3.4–10.8)

## 2022-06-09 PROCEDURE — 80053 COMPREHEN METABOLIC PANEL: CPT

## 2022-06-09 PROCEDURE — 84703 CHORIONIC GONADOTROPIN ASSAY: CPT

## 2022-06-09 PROCEDURE — 36415 COLL VENOUS BLD VENIPUNCTURE: CPT

## 2022-06-09 PROCEDURE — 81001 URINALYSIS AUTO W/SCOPE: CPT

## 2022-06-09 PROCEDURE — 85025 COMPLETE CBC W/AUTO DIFF WBC: CPT

## 2022-06-09 NOTE — DISCHARGE INSTRUCTIONS
Take the following medications the morning of surgery: NONE    ARRIVAL TIME: HOSPITAL WILL CALL WITH ARRIVAL TIME      If you are on prescription narcotic pain medication to control your pain you may also take that medication the morning of surgery.    General Instructions:  Do not eat solid food after midnight the night before surgery.  You may drink clear liquids day of surgery but must stop at least one hour before your hospital arrival time.  It is beneficial for you to have a clear drink that contains carbohydrates the day of surgery.  We suggest a 12 to 20 ounce bottle of Gatorade or Powerade for non-diabetic patients or a 12 to 20 ounce bottle of G2 or Powerade Zero for diabetic patients. (Pediatric patients, are not advised to drink a 12 to 20 ounce carbohydrate drink)    Clear liquids are liquids you can see through.  Nothing red in color.     Plain water                               Sports drinks  Sodas                                   Gelatin (Jell-O)  Fruit juices without pulp such as white grape juice and apple juice  Popsicles that contain no fruit or yogurt  Tea or coffee (no cream or milk added)  Gatorade / Powerade  G2 / Powerade Zero    Patients who avoid smoking, chewing tobacco and alcohol for 4 weeks prior to surgery have a reduced risk of post-operative complications.  Quit smoking as many days before surgery as you can.  Do not smoke, use chewing tobacco or drink alcohol the day of surgery.   If applicable bring your C-PAP/ BI-PAP machine.  Bring any papers given to you in the doctor’s office.  Wear clean comfortable clothes.  Do not wear contact lenses, false eyelashes or make-up.  Bring a case for your glasses.   Remove all piercings.  Leave jewelry and any other valuables at home.  Hair extensions with metal clips must be removed prior to surgery.  The Pre-Admission Testing nurse will instruct you to bring medications if unable to obtain an accurate list in Pre-Admission Testing.       Preventing a Surgical Site Infection:  For 2 to 3 days before surgery, avoid shaving with a razor because the razor can irritate skin and make it easier to develop an infection.    Any areas of open skin can increase the risk of a post-operative wound infection by allowing bacteria to enter and travel throughout the body.  Notify your surgeon if you have any skin wounds / rashes even if it is not near the expected surgical site.  The area will need assessed to determine if surgery should be delayed until it is healed.  The night prior to surgery shower using a fresh bar of anti-bacterial soap (such as Dial) and clean washcloth.  Sleep in a clean bed with clean clothing.  Do not allow pets to sleep with you.  Shower on the morning of surgery using a fresh bar of anti-bacterial soap (such as Dial) and clean washcloth.  Dry with a clean towel and dress in clean clothing.  Ask your surgeon if you will be receiving antibiotics prior to surgery.  Make sure you, your family, and all healthcare providers clean their hands with soap and water or an alcohol based hand  before caring for you or your wound.    Day of surgery:  Your arrival time is approximately two hours before your scheduled surgery time.  Upon arrival, a Pre-op nurse and Anesthesiologist will review your health history, obtain vital signs, and answer questions you may have.  The only belongings needed at this time will be a list of your home medications and if applicable your C-PAP/BI-PAP machine.  A Pre-op nurse will start an IV and you may receive medication in preparation for surgery, including something to help you relax.     Please be aware that surgery does come with discomfort.  We want to make every effort to control your discomfort so please discuss any uncontrolled symptoms with your nurse.   Your doctor will most likely have prescribed pain medications.      If you are going home after surgery you will receive individualized written  care instructions before being discharged.  A responsible adult must drive you to and from the hospital on the day of your surgery and stay with you for 24 hours.  Discharge prescriptions can be filled by the hospital pharmacy during regular pharmacy hours.  If you are having surgery late in the day/evening your prescription may be e-prescribed to your pharmacy.  Please verify your pharmacy hours or chose a 24 hour pharmacy to avoid not having access to your prescription because your pharmacy has closed for the day.    If you are staying overnight following surgery, you will be transported to your hospital room following the recovery period.  [facility] has all private rooms.    If you have any questions please call Pre-Admission Testing at [phone].  Deductibles and co-payments are collected on the day of service. Please be prepared to pay the required co-pay, deductible or deposit on the day of service as defined by your plan.    Patient Education for Self-Quarantine Process    Following your COVID testing, we strongly recommend that you wear a mask when you are with other people and practice social distancing.   Limit your activities to only required outings.  Wash your hands with soap and water frequently for at least 20 seconds.   Avoid touching your eyes, nose and mouth with unwashed hands.  Do not share anything - utensils, drinking glasses, food from the same bowl.   Sanitize household surfaces daily. Include all high touch areas (door handles, light switches, phones, countertops, etc.)    Call your surgeon immediately if you experience any of the following symptoms:  Sore Throat  Shortness of Breath or difficulty breathing  Cough  Chills  Body soreness or muscle pain  Headache  Fever  New loss of taste or smell  Do not arrive for your surgery ill.  Your procedure will need to be rescheduled to another time.  You will need to call your physician before the day of surgery to avoid any  unnecessary exposure to hospital staff as well as other patients.

## 2022-07-02 ENCOUNTER — LAB (OUTPATIENT)
Dept: LAB | Facility: HOSPITAL | Age: 27
End: 2022-07-02

## 2022-07-02 DIAGNOSIS — G89.29 CHRONIC LEFT SHOULDER PAIN: ICD-10-CM

## 2022-07-02 DIAGNOSIS — M25.512 CHRONIC LEFT SHOULDER PAIN: ICD-10-CM

## 2022-07-02 LAB — SARS-COV-2 ORF1AB RESP QL NAA+PROBE: DETECTED

## 2022-07-02 PROCEDURE — U0004 COV-19 TEST NON-CDC HGH THRU: HCPCS

## 2022-07-02 PROCEDURE — C9803 HOPD COVID-19 SPEC COLLECT: HCPCS

## 2022-07-05 RX ORDER — PROMETHAZINE HYDROCHLORIDE 12.5 MG/1
25 TABLET ORAL EVERY 6 HOURS PRN
Qty: 30 TABLET | Refills: 0 | Status: SHIPPED | OUTPATIENT
Start: 2022-07-05 | End: 2022-07-22

## 2022-07-05 RX ORDER — OXYCODONE HYDROCHLORIDE AND ACETAMINOPHEN 5; 325 MG/1; MG/1
1 TABLET ORAL EVERY 6 HOURS PRN
Qty: 30 TABLET | Refills: 0 | Status: SHIPPED | OUTPATIENT
Start: 2022-07-05 | End: 2022-07-22

## 2022-07-22 ENCOUNTER — TELEPHONE (OUTPATIENT)
Dept: FAMILY MEDICINE CLINIC | Facility: CLINIC | Age: 27
End: 2022-07-22

## 2022-07-22 ENCOUNTER — PRE-ADMISSION TESTING (OUTPATIENT)
Dept: PREADMISSION TESTING | Facility: HOSPITAL | Age: 27
End: 2022-07-22

## 2022-07-22 VITALS
OXYGEN SATURATION: 100 % | HEIGHT: 69 IN | BODY MASS INDEX: 42.65 KG/M2 | HEART RATE: 70 BPM | RESPIRATION RATE: 16 BRPM | SYSTOLIC BLOOD PRESSURE: 115 MMHG | TEMPERATURE: 97.2 F | WEIGHT: 288 LBS | DIASTOLIC BLOOD PRESSURE: 78 MMHG

## 2022-07-22 LAB
ALBUMIN SERPL-MCNC: 4.5 G/DL (ref 3.5–5.2)
ALBUMIN/GLOB SERPL: 1.7 G/DL
ALP SERPL-CCNC: 89 U/L (ref 39–117)
ALT SERPL W P-5'-P-CCNC: 27 U/L (ref 1–33)
ANION GAP SERPL CALCULATED.3IONS-SCNC: 11 MMOL/L (ref 5–15)
AST SERPL-CCNC: 19 U/L (ref 1–32)
BACTERIA UR QL AUTO: ABNORMAL /HPF
BASOPHILS # BLD AUTO: 0.04 10*3/MM3 (ref 0–0.2)
BASOPHILS NFR BLD AUTO: 0.7 % (ref 0–1.5)
BILIRUB SERPL-MCNC: 0.4 MG/DL (ref 0–1.2)
BILIRUB UR QL STRIP: NEGATIVE
BUN SERPL-MCNC: 11 MG/DL (ref 6–20)
BUN/CREAT SERPL: 20.4 (ref 7–25)
CALCIUM SPEC-SCNC: 9.1 MG/DL (ref 8.6–10.5)
CHLORIDE SERPL-SCNC: 103 MMOL/L (ref 98–107)
CLARITY UR: ABNORMAL
CO2 SERPL-SCNC: 23 MMOL/L (ref 22–29)
COLOR UR: YELLOW
CREAT SERPL-MCNC: 0.54 MG/DL (ref 0.57–1)
DEPRECATED RDW RBC AUTO: 38.2 FL (ref 37–54)
EGFRCR SERPLBLD CKD-EPI 2021: 128.8 ML/MIN/1.73
EOSINOPHIL # BLD AUTO: 0.12 10*3/MM3 (ref 0–0.4)
EOSINOPHIL NFR BLD AUTO: 2.1 % (ref 0.3–6.2)
ERYTHROCYTE [DISTWIDTH] IN BLOOD BY AUTOMATED COUNT: 12.2 % (ref 12.3–15.4)
GLOBULIN UR ELPH-MCNC: 2.6 GM/DL
GLUCOSE SERPL-MCNC: 95 MG/DL (ref 65–99)
GLUCOSE UR STRIP-MCNC: NEGATIVE MG/DL
HCG SERPL QL: NEGATIVE
HCT VFR BLD AUTO: 40.5 % (ref 34–46.6)
HGB BLD-MCNC: 13.2 G/DL (ref 12–15.9)
HGB UR QL STRIP.AUTO: NEGATIVE
HYALINE CASTS UR QL AUTO: ABNORMAL /LPF
IMM GRANULOCYTES # BLD AUTO: 0.01 10*3/MM3 (ref 0–0.05)
IMM GRANULOCYTES NFR BLD AUTO: 0.2 % (ref 0–0.5)
KETONES UR QL STRIP: NEGATIVE
LEUKOCYTE ESTERASE UR QL STRIP.AUTO: ABNORMAL
LYMPHOCYTES # BLD AUTO: 2.14 10*3/MM3 (ref 0.7–3.1)
LYMPHOCYTES NFR BLD AUTO: 37.9 % (ref 19.6–45.3)
MCH RBC QN AUTO: 28.1 PG (ref 26.6–33)
MCHC RBC AUTO-ENTMCNC: 32.6 G/DL (ref 31.5–35.7)
MCV RBC AUTO: 86.2 FL (ref 79–97)
MONOCYTES # BLD AUTO: 0.43 10*3/MM3 (ref 0.1–0.9)
MONOCYTES NFR BLD AUTO: 7.6 % (ref 5–12)
NEUTROPHILS NFR BLD AUTO: 2.9 10*3/MM3 (ref 1.7–7)
NEUTROPHILS NFR BLD AUTO: 51.5 % (ref 42.7–76)
NITRITE UR QL STRIP: NEGATIVE
NRBC BLD AUTO-RTO: 0 /100 WBC (ref 0–0.2)
PH UR STRIP.AUTO: 6 [PH] (ref 5–8)
PLATELET # BLD AUTO: 338 10*3/MM3 (ref 140–450)
PMV BLD AUTO: 9.1 FL (ref 6–12)
POTASSIUM SERPL-SCNC: 4.4 MMOL/L (ref 3.5–5.2)
PROT SERPL-MCNC: 7.1 G/DL (ref 6–8.5)
PROT UR QL STRIP: NEGATIVE
RBC # BLD AUTO: 4.7 10*6/MM3 (ref 3.77–5.28)
RBC # UR STRIP: ABNORMAL /HPF
REF LAB TEST METHOD: ABNORMAL
SODIUM SERPL-SCNC: 137 MMOL/L (ref 136–145)
SP GR UR STRIP: 1.02 (ref 1–1.03)
SQUAMOUS #/AREA URNS HPF: ABNORMAL /HPF
UROBILINOGEN UR QL STRIP: ABNORMAL
WBC # UR STRIP: ABNORMAL /HPF
WBC NRBC COR # BLD: 5.64 10*3/MM3 (ref 3.4–10.8)

## 2022-07-22 PROCEDURE — 80053 COMPREHEN METABOLIC PANEL: CPT

## 2022-07-22 PROCEDURE — 81001 URINALYSIS AUTO W/SCOPE: CPT

## 2022-07-22 PROCEDURE — 36415 COLL VENOUS BLD VENIPUNCTURE: CPT

## 2022-07-22 PROCEDURE — 84703 CHORIONIC GONADOTROPIN ASSAY: CPT

## 2022-07-22 PROCEDURE — 85025 COMPLETE CBC W/AUTO DIFF WBC: CPT

## 2022-07-22 RX ORDER — MULTIPLE VITAMINS W/ MINERALS TAB 9MG-400MCG
1 TAB ORAL DAILY
COMMUNITY

## 2022-07-22 NOTE — DISCHARGE INSTRUCTIONS
Take the following medications the morning of surgery:      NONE      TIME OF ARRIVAL WILL BE CALLED TO YOU THE DAY BEFORE YOUR PROCEDURE.    If you are on prescription narcotic pain medication to control your pain you may also take that medication the morning of surgery.    General Instructions:  Do not eat solid food after midnight the night before surgery.  You may drink clear liquids day of surgery but must stop at least one hour before your hospital arrival time.  It is beneficial for you to have a clear drink that contains carbohydrates the day of surgery.  We suggest a 12 to 20 ounce bottle of Gatorade or Powerade for non-diabetic patients or a 12 to 20 ounce bottle of G2 or Powerade Zero for diabetic patients. (Pediatric patients, are not advised to drink a 12 to 20 ounce carbohydrate drink)    Clear liquids are liquids you can see through.  Nothing red in color.     Plain water                               Sports drinks  Sodas                                   Gelatin (Jell-O)  Fruit juices without pulp such as white grape juice and apple juice  Popsicles that contain no fruit or yogurt  Tea or coffee (no cream or milk added)  Gatorade / Powerade  G2 / Powerade Zero      Patients who avoid smoking, chewing tobacco and alcohol for 4 weeks prior to surgery have a reduced risk of post-operative complications.  Quit smoking as many days before surgery as you can.  Do not smoke, use chewing tobacco or drink alcohol the day of surgery.   If applicable bring your C-PAP/ BI-PAP machine.  Bring any papers given to you in the doctor’s office.  Wear clean comfortable clothes.  Do not wear contact lenses, false eyelashes or make-up.  Bring a case for your glasses.   Bring crutches or walker if applicable.  Remove all piercings.  Leave jewelry and any other valuables at home.  Hair extensions with metal clips must be removed prior to surgery.  The Pre-Admission Testing nurse will instruct you to bring medications if  unable to obtain an accurate list in Pre-Admission Testing.            Preventing a Surgical Site Infection:  For 2 to 3 days before surgery, avoid shaving with a razor because the razor can irritate skin and make it easier to develop an infection.    Any areas of open skin can increase the risk of a post-operative wound infection by allowing bacteria to enter and travel throughout the body.  Notify your surgeon if you have any skin wounds / rashes even if it is not near the expected surgical site.  The area will need assessed to determine if surgery should be delayed until it is healed.  The night prior to surgery shower using a fresh bar of anti-bacterial soap (such as Dial) and clean washcloth.  Sleep in a clean bed with clean clothing.  Do not allow pets to sleep with you.  Shower on the morning of surgery using a fresh bar of anti-bacterial soap (such as Dial) and clean washcloth.  Dry with a clean towel and dress in clean clothing.  Ask your surgeon if you will be receiving antibiotics prior to surgery.  Make sure you, your family, and all healthcare providers clean their hands with soap and water or an alcohol based hand  before caring for you or your wound.    Day of surgery:  Your arrival time is approximately two hours before your scheduled surgery time.  Upon arrival, a Pre-op nurse and Anesthesiologist will review your health history, obtain vital signs, and answer questions you may have.  The only belongings needed at this time will be a list of your home medications and if applicable your C-PAP/BI-PAP machine.  A Pre-op nurse will start an IV and you may receive medication in preparation for surgery, including something to help you relax.     Please be aware that surgery does come with discomfort.  We want to make every effort to control your discomfort so please discuss any uncontrolled symptoms with your nurse.   Your doctor will most likely have prescribed pain medications.      If you are  going home after surgery you will receive individualized written care instructions before being discharged.  A responsible adult must drive you to and from the hospital on the day of your surgery and stay with you for 24 hours.  Discharge prescriptions can be filled by the hospital pharmacy during regular pharmacy hours.  If you are having surgery late in the day/evening your prescription may be e-prescribed to your pharmacy.  Please verify your pharmacy hours or chose a 24 hour pharmacy to avoid not having access to your prescription because your pharmacy has closed for the day.    If you are staying overnight following surgery, you will be transported to your hospital room following the recovery period.  [facility] has all private rooms.    If you have any questions please call Pre-Admission Testing at [phone].  Deductibles and co-payments are collected on the day of service. Please be prepared to pay the required co-pay, deductible or deposit on the day of service as defined by your plan.    Patient Education for Self-Quarantine Process    Following your COVID testing, we strongly recommend that you wear a mask when you are with other people and practice social distancing.   Limit your activities to only required outings.  Wash your hands with soap and water frequently for at least 20 seconds.   Avoid touching your eyes, nose and mouth with unwashed hands.  Do not share anything - utensils, drinking glasses, food from the same bowl.   Sanitize household surfaces daily. Include all high touch areas (door handles, light switches, phones, countertops, etc.)    Call your surgeon immediately if you experience any of the following symptoms:  Sore Throat  Shortness of Breath or difficulty breathing  Cough  Chills  Body soreness or muscle pain  Headache  Fever  New loss of taste or smell  Do not arrive for your surgery ill.  Your procedure will need to be rescheduled to another time.  You will need  to call your physician before the day of surgery to avoid any unnecessary exposure to hospital staff as well as other patients.

## 2022-07-22 NOTE — TELEPHONE ENCOUNTER
Left Vm for patient to call me back. She has lab appt next wed and we dont know why she need labs. Also lab will need time to spin down her blood, so 415 would be the latest she could come.

## 2022-07-23 ENCOUNTER — LAB (OUTPATIENT)
Dept: LAB | Facility: HOSPITAL | Age: 27
End: 2022-07-23

## 2022-07-23 LAB — SARS-COV-2 ORF1AB RESP QL NAA+PROBE: NOT DETECTED

## 2022-07-23 PROCEDURE — U0004 COV-19 TEST NON-CDC HGH THRU: HCPCS

## 2022-07-23 PROCEDURE — C9803 HOPD COVID-19 SPEC COLLECT: HCPCS

## 2022-07-26 ENCOUNTER — ANESTHESIA EVENT (OUTPATIENT)
Dept: PERIOP | Facility: HOSPITAL | Age: 27
End: 2022-07-26

## 2022-07-26 ENCOUNTER — PATIENT MESSAGE (OUTPATIENT)
Dept: OBSTETRICS AND GYNECOLOGY | Age: 27
End: 2022-07-26

## 2022-07-26 ENCOUNTER — ANESTHESIA (OUTPATIENT)
Dept: PERIOP | Facility: HOSPITAL | Age: 27
End: 2022-07-26

## 2022-07-26 ENCOUNTER — HOSPITAL ENCOUNTER (OUTPATIENT)
Facility: HOSPITAL | Age: 27
Setting detail: HOSPITAL OUTPATIENT SURGERY
Discharge: HOME | End: 2022-07-26
Attending: ORTHOPAEDIC SURGERY | Admitting: ORTHOPAEDIC SURGERY

## 2022-07-26 VITALS
HEART RATE: 67 BPM | SYSTOLIC BLOOD PRESSURE: 132 MMHG | RESPIRATION RATE: 16 BRPM | DIASTOLIC BLOOD PRESSURE: 84 MMHG | OXYGEN SATURATION: 100 % | TEMPERATURE: 97.9 F

## 2022-07-26 DIAGNOSIS — M25.512 CHRONIC LEFT SHOULDER PAIN: Primary | ICD-10-CM

## 2022-07-26 DIAGNOSIS — G89.29 CHRONIC LEFT SHOULDER PAIN: Primary | ICD-10-CM

## 2022-07-26 PROCEDURE — 25010000002 TRIAMCINOLONE PER 10 MG: Performed by: ORTHOPAEDIC SURGERY

## 2022-07-26 PROCEDURE — 25010000002 FENTANYL CITRATE (PF) 50 MCG/ML SOLUTION: Performed by: ANESTHESIOLOGY

## 2022-07-26 PROCEDURE — 25010000002 PROPOFOL 10 MG/ML EMULSION: Performed by: NURSE ANESTHETIST, CERTIFIED REGISTERED

## 2022-07-26 PROCEDURE — 25010000002 MIDAZOLAM PER 1 MG: Performed by: ANESTHESIOLOGY

## 2022-07-26 PROCEDURE — 25010000002 ROPIVACAINE PER 1 MG: Performed by: ANESTHESIOLOGY

## 2022-07-26 RX ORDER — HYDROCODONE BITARTRATE AND ACETAMINOPHEN 5; 325 MG/1; MG/1
1 TABLET ORAL EVERY 6 HOURS PRN
Qty: 30 TABLET | Refills: 0 | Status: SHIPPED | OUTPATIENT
Start: 2022-07-26 | End: 2022-08-18

## 2022-07-26 RX ORDER — PROMETHAZINE HYDROCHLORIDE 12.5 MG/1
25 TABLET ORAL EVERY 6 HOURS PRN
Qty: 30 TABLET | Refills: 0 | Status: SHIPPED | OUTPATIENT
Start: 2022-07-26 | End: 2022-08-18

## 2022-07-26 RX ORDER — ROPIVACAINE HYDROCHLORIDE 5 MG/ML
INJECTION, SOLUTION EPIDURAL; INFILTRATION; PERINEURAL
Status: COMPLETED | OUTPATIENT
Start: 2022-07-26 | End: 2022-07-26

## 2022-07-26 RX ORDER — SODIUM CHLORIDE, SODIUM LACTATE, POTASSIUM CHLORIDE, CALCIUM CHLORIDE 600; 310; 30; 20 MG/100ML; MG/100ML; MG/100ML; MG/100ML
9 INJECTION, SOLUTION INTRAVENOUS CONTINUOUS
Status: DISCONTINUED | OUTPATIENT
Start: 2022-07-26 | End: 2022-07-26 | Stop reason: HOSPADM

## 2022-07-26 RX ORDER — LIDOCAINE HYDROCHLORIDE 10 MG/ML
0.5 INJECTION, SOLUTION EPIDURAL; INFILTRATION; INTRACAUDAL; PERINEURAL ONCE AS NEEDED
Status: DISCONTINUED | OUTPATIENT
Start: 2022-07-26 | End: 2022-07-26 | Stop reason: HOSPADM

## 2022-07-26 RX ORDER — FENTANYL CITRATE 50 UG/ML
50 INJECTION, SOLUTION INTRAMUSCULAR; INTRAVENOUS
Status: DISCONTINUED | OUTPATIENT
Start: 2022-07-26 | End: 2022-07-26 | Stop reason: HOSPADM

## 2022-07-26 RX ORDER — LIDOCAINE HYDROCHLORIDE 20 MG/ML
INJECTION, SOLUTION INFILTRATION; PERINEURAL AS NEEDED
Status: DISCONTINUED | OUTPATIENT
Start: 2022-07-26 | End: 2022-07-26 | Stop reason: SURG

## 2022-07-26 RX ORDER — PROPOFOL 10 MG/ML
VIAL (ML) INTRAVENOUS AS NEEDED
Status: DISCONTINUED | OUTPATIENT
Start: 2022-07-26 | End: 2022-07-26 | Stop reason: SURG

## 2022-07-26 RX ORDER — MIDAZOLAM HYDROCHLORIDE 1 MG/ML
1 INJECTION INTRAMUSCULAR; INTRAVENOUS
Status: COMPLETED | OUTPATIENT
Start: 2022-07-26 | End: 2022-07-26

## 2022-07-26 RX ORDER — SODIUM CHLORIDE 0.9 % (FLUSH) 0.9 %
3-10 SYRINGE (ML) INJECTION AS NEEDED
Status: DISCONTINUED | OUTPATIENT
Start: 2022-07-26 | End: 2022-07-26 | Stop reason: HOSPADM

## 2022-07-26 RX ORDER — SODIUM CHLORIDE 0.9 % (FLUSH) 0.9 %
3 SYRINGE (ML) INJECTION EVERY 12 HOURS SCHEDULED
Status: DISCONTINUED | OUTPATIENT
Start: 2022-07-26 | End: 2022-07-26 | Stop reason: HOSPADM

## 2022-07-26 RX ORDER — FAMOTIDINE 10 MG/ML
20 INJECTION, SOLUTION INTRAVENOUS ONCE
Status: COMPLETED | OUTPATIENT
Start: 2022-07-26 | End: 2022-07-26

## 2022-07-26 RX ADMIN — PROPOFOL 50 MG: 10 INJECTION, EMULSION INTRAVENOUS at 11:54

## 2022-07-26 RX ADMIN — MIDAZOLAM 1 MG: 1 INJECTION INTRAMUSCULAR; INTRAVENOUS at 11:14

## 2022-07-26 RX ADMIN — Medication 3 ML: at 10:53

## 2022-07-26 RX ADMIN — LIDOCAINE HYDROCHLORIDE 60 MG: 20 INJECTION, SOLUTION INFILTRATION; PERINEURAL at 11:53

## 2022-07-26 RX ADMIN — FENTANYL CITRATE 50 MCG: 50 INJECTION INTRAMUSCULAR; INTRAVENOUS at 11:10

## 2022-07-26 RX ADMIN — MIDAZOLAM 1 MG: 1 INJECTION INTRAMUSCULAR; INTRAVENOUS at 11:10

## 2022-07-26 RX ADMIN — PROPOFOL 100 MG: 10 INJECTION, EMULSION INTRAVENOUS at 11:53

## 2022-07-26 RX ADMIN — ROPIVACAINE HYDROCHLORIDE 30 ML: 5 INJECTION EPIDURAL; INFILTRATION; PERINEURAL at 11:30

## 2022-07-26 RX ADMIN — SODIUM CHLORIDE, POTASSIUM CHLORIDE, SODIUM LACTATE AND CALCIUM CHLORIDE 9 ML/HR: 600; 310; 30; 20 INJECTION, SOLUTION INTRAVENOUS at 10:48

## 2022-07-26 RX ADMIN — FAMOTIDINE 20 MG: 10 INJECTION INTRAVENOUS at 10:48

## 2022-07-26 NOTE — BRIEF OP NOTE
MANIPULATION OF  Progress Note    Davida Quinonez  7/26/2022    Pre-op Diagnosis:   Lt adh cap       Post-Op Diagnosis Codes:   same    Procedure/CPT® Codes:        Procedure(s):  LEFT SHOULDER MANIPULATION, cortisone injection    Surgeon(s):  Navid Hinds MD    Anesthesia: General    Staff:   Circulator: Aryan Morales RN         Estimated Blood Loss: minimal    Urine Voided: * No values recorded between 7/26/2022 11:49 AM and 7/26/2022 12:02 PM *    Specimens:                None          Drains: * No LDAs found *    Findings: above        Complications: none known          MIGUEL Hinds MD     Date: 7/26/2022  Time: 12:02 EDT

## 2022-07-26 NOTE — DISCHARGE INSTRUCTIONS
What to expect after a Nerve Block    Nerve blocks administered to block pain affect many types of nerves, including those nerves that control movement, pain, and normal sensation. Following a nerve block, you may notice some bruising at the site where the block was given. You may experience sensations such as: numbness of the affected area or limb, tingling, heaviness (that is the limb feels heavy to you), weakness or inability to move the affected arm or leg, or a feeling as if your arm or leg has “fallen asleep.”     A nerve block can last from 2 to 36 hours depending on the medications used.  Usually the weakness wears off first followed by the tingling and heaviness. As the block wears off, you may begin to notice pain; however, this sequence of events may occur in any order. Typically, you will be able to move your limb before you will feel it. Once a nerve block begins to wear off, the effects are usually completely gone within 60 minutes.  If you experience continued side effects that you believe are block related for longer than 48 hours, please call your healthcare provider. Please see block-specific instructions below.    Instructions for any block involving the shoulder or arm  If you have had any kind of shoulder/arm block, you will go home with your arm in a sling. Wear the sling until the block has completely worn off. You may be required to wear it for a longer period of time per your surgeon’s recommendations.  If you have had a shoulder/arm block, it is a good idea to sleep on a recliner with pillows under your arm.    You may experience symptoms such as:  Shortness of breath  Hoarseness   Blurry vision  Unequal pupils  Drooping of your face on the same side as the block was performed    These are side effects associated with this kind of block and should go away within 12 hours.    Note: If you have severe or prolonged shortness of breath, please seek medical assistance as soon as possible.      Protection of a “blocked” arm or leg (limb)  After a nerve block, you cannot feel pain, pressure, or extremes of temperature in the affected limb. And because of this, your blocked limb is at more risk for injury. For example, it is possible to burn your limb on an extremely hot surface without feeling it.     When resting, it is important to reposition your limb periodically to avoid prolonged pressure on it. This may require the use of pillows and padding.    While sleeping, you should avoid rolling onto the affected limb or putting too much pressure on it.     If you have a cast or tight dressing, check the color of your fingers or toes of the affected limb. Call your surgeon if they look discolored (that is, dusky, dark colored).    Use caution in cold weather. Cover your limb appropriately to protect it from the cold.      Pain Management:    Your surgeon will give you a prescription for pain medication. Begin taking this before the nerve block wears off. Bear in mind that sometimes the block can wear off in the middle of the night.

## 2022-07-26 NOTE — ANESTHESIA PROCEDURE NOTES
Peripheral Block      Patient reassessed immediately prior to procedure    Patient location during procedure: holding area  Start time: 7/26/2022 11:16 AM  Stop time: 7/26/2022 11:19 AM  Reason for block: at surgeon's request and post-op pain management  Performed by  Anesthesiologist: Deborah Amaya MD  Preanesthetic Checklist  Completed: patient identified, IV checked, site marked, risks and benefits discussed, surgical consent, monitors and equipment checked, pre-op evaluation and timeout performed  Prep:  Pt Position: sitting  Sterile barriers:gloves, cap and mask  Prep: ChloraPrep  Patient monitoring: blood pressure monitoring, continuous pulse oximetry and EKG  Procedure    Sedation: yes    Guidance:ultrasound guided    ULTRASOUND INTERPRETATION. Using ultrasound guidance a 21 G gauge needle was placed in close proximity to the nerve, at which point, under ultrasound guidance anesthetic was injected in the area of the nerve and spread of the anesthesia was seen on ultrasound in close proximity thereto.  There were no abnormalities seen on ultrasound; a digital image was taken; and the patient tolerated the procedure with no complications. Images:still images obtained, printed/placed on chart    Laterality:left  Block Type:interscalene  Injection Technique:single-shot  Needle Type:echogenic  Needle Gauge:21 G  Loss of resistance: Normal     Medications Used: ropivacaine (NAROPIN) 0.5 % injection, 30 mL      Post Assessment  Injection Assessment: negative aspiration for heme, no paresthesia on injection and incremental injection  Patient Tolerance:comfortable throughout block  Complications:no  Additional Notes  Ultrasound guidance was used to guide needle placement, and to view and verify medication disbursement.

## 2022-07-26 NOTE — ANESTHESIA POSTPROCEDURE EVALUATION
Patient: Davida Quinonez    Procedure Summary     Date: 07/26/22 Room / Location:  [facility] [address] [place] [address] [facility]    Anesthesia Start: 1146 Anesthesia Stop: 1200    Procedure: LEFT SHOULDER MANIPULATION (Left ) Diagnosis:     Surgeons: Navid Hinds MD Provider: Deborah Amaya MD    Anesthesia Type: MAC, regional ASA Status: 3          Anesthesia Type: MAC, regional    Vitals  Vitals Value Taken Time   /85 07/26/22 1230   Temp 36.9 °C (98.5 °F) 07/26/22 1200   Pulse 66 07/26/22 1230   Resp 16 07/26/22 1230   SpO2 97 % 07/26/22 1230           Post Anesthesia Care and Evaluation    Patient location during evaluation: bedside  Patient participation: complete - patient participated  Level of consciousness: awake  Pain management: adequate    Airway patency: patent  Anesthetic complications: No anesthetic complications    Cardiovascular status: acceptable  Respiratory status: acceptable  Hydration status: acceptable    Comments: /85 (BP Location: Right arm, Patient Position: Lying)   Pulse 66   Temp 36.9 °C (98.5 °F) (Oral)   Resp 16   LMP 07/15/2022   SpO2 97%

## 2022-07-26 NOTE — ANESTHESIA PREPROCEDURE EVALUATION
Anesthesia Evaluation     NPO Solid Status: > 8 hours  NPO Liquid Status: > 2 hours           Airway   Mallampati: III  TM distance: >3 FB  Neck ROM: full  Large neck circumference  Dental - normal exam     Pulmonary - normal exam   (+) sleep apnea,   Cardiovascular - normal exam  Exercise tolerance: good (4-7 METS)        Neuro/Psych  (+) psychiatric history Anxiety and Depression,    GI/Hepatic/Renal/Endo    (+) morbid obesity,      Musculoskeletal     Abdominal   (+) obese,    Substance History      OB/GYN          Other   arthritis,                      Anesthesia Plan    ASA 3     MAC and regional     intravenous induction     Anesthetic plan, risks, benefits, and alternatives have been provided, discussed and informed consent has been obtained with: patient.        CODE STATUS:

## 2022-07-26 NOTE — PERIOPERATIVE NURSING NOTE
Peripheral IV attempts x2 unsuccessful by myself and x2 by another staff RN. Anesthesia notified and IV therapy consulted per anesthesia request.

## 2022-07-27 NOTE — TELEPHONE ENCOUNTER
----- Message from Blanca Woodward MA sent at 7/27/2022 10:14 AM EDT -----  Regarding: FW: Pregnancy     ----- Message -----  From: Angelina Mooney MA  Sent: 7/26/2022   2:19 PM EDT  To: Blanca Woodward MA  Subject: FW: Pregnancy                                    See message  ----- Message -----  From: Davida Stiles  Sent: 7/26/2022   1:39 PM EDT  To: Lilly Mike [facility]  Subject: Pregnancy                                        Hello,    I just had a shoulder manipulation done and I was wondering how long I should wait until I should try to conceive? If I am not taking any pain medication.    I hope you are doing well,    Davida STILES

## 2022-07-27 NOTE — TELEPHONE ENCOUNTER
Pt had manipulation under anesthesia yesterday.Pt advised to wait at least 2 week for meds and anesthisia to clear system

## 2022-08-03 ENCOUNTER — PATIENT MESSAGE (OUTPATIENT)
Dept: OBSTETRICS AND GYNECOLOGY | Age: 27
End: 2022-08-03

## 2022-08-08 ENCOUNTER — TELEPHONE (OUTPATIENT)
Dept: OBSTETRICS AND GYNECOLOGY | Age: 27
End: 2022-08-08

## 2022-08-08 NOTE — TELEPHONE ENCOUNTER
Called patient - left message . Patient sent a chart message c/o having spotting after IC - let her know she most likely will need an appointment to rule out a problem she is having .

## 2022-08-08 NOTE — TELEPHONE ENCOUNTER
From: Davida Quinonez  To: Yadi Barrera MD  Sent: 8/3/2022 5:06 PM EDT  Subject: Spotting    Hello,    I am sorry to bother you again. I know I shouldn’t google so I thought I would ask, is it normal to spot after intercourse? I shouldn’t start until the 12th.

## 2022-08-08 NOTE — TELEPHONE ENCOUNTER
Pt had a recent UTI in July and wants to make sure she doesn't have another one. Pt states that she is not having any symptoms other than vaginal bleeding when she wipes but just wants to leave a sample to make sure that she doesn't have a UTI. Pt states that she is currently bleeding and is not sure why and thinks it could be a UTI.  Pt's LMP 7-

## 2022-08-09 NOTE — TELEPHONE ENCOUNTER
Pt c/o  having spotting after IC - could you call her and schedule her for gyn eval - could be with NP please ans thank you .

## 2022-08-18 ENCOUNTER — OFFICE VISIT (OUTPATIENT)
Dept: OBSTETRICS AND GYNECOLOGY | Age: 27
End: 2022-08-18

## 2022-08-18 VITALS
SYSTOLIC BLOOD PRESSURE: 126 MMHG | BODY MASS INDEX: 42.95 KG/M2 | WEIGHT: 290 LBS | DIASTOLIC BLOOD PRESSURE: 84 MMHG | HEIGHT: 69 IN

## 2022-08-18 DIAGNOSIS — N93.0 POSTCOITAL BLEEDING: ICD-10-CM

## 2022-08-18 DIAGNOSIS — Z13.89 SCREENING FOR BLOOD OR PROTEIN IN URINE: ICD-10-CM

## 2022-08-18 DIAGNOSIS — N92.6 IRREGULAR BLEEDING: Primary | ICD-10-CM

## 2022-08-18 LAB
B-HCG UR QL: NEGATIVE
BILIRUB BLD-MCNC: NEGATIVE MG/DL
CLARITY, POC: CLEAR
COLOR UR: YELLOW
EXPIRATION DATE: NORMAL
GLUCOSE UR STRIP-MCNC: NEGATIVE MG/DL
INTERNAL NEGATIVE CONTROL: NORMAL
INTERNAL POSITIVE CONTROL: NORMAL
KETONES UR QL: NEGATIVE
LEUKOCYTE EST, POC: ABNORMAL
Lab: NORMAL
NITRITE UR-MCNC: NEGATIVE MG/ML
PH UR: 6 [PH] (ref 5–8)
PROT UR STRIP-MCNC: NEGATIVE MG/DL
RBC # UR STRIP: ABNORMAL /UL
SP GR UR: 1.03 (ref 1–1.03)
UROBILINOGEN UR QL: NORMAL

## 2022-08-18 PROCEDURE — 81025 URINE PREGNANCY TEST: CPT | Performed by: NURSE PRACTITIONER

## 2022-08-18 PROCEDURE — 81002 URINALYSIS NONAUTO W/O SCOPE: CPT | Performed by: NURSE PRACTITIONER

## 2022-08-18 PROCEDURE — 99213 OFFICE O/P EST LOW 20 MIN: CPT | Performed by: NURSE PRACTITIONER

## 2022-08-18 NOTE — PROGRESS NOTES
"Subjective     Chief Complaint   Patient presents with   • Gynecologic Exam     Spotting since surgery, spotting after IC also.       Davida Quinonez is a 27 y.o.  whose LMP is Patient's last menstrual period was 2022.     Pt presents today with chief complaint of vaginal spotting and postcoital bleeding  She is   She took a pregnancy test this am and it was negative  Prior to this her periods have been regular  She had arm surgery on  and started a week after this  She also had covid in   She has not had any dysuria, abnormal discharge or pelvic pain  Pt of Dr. Barrera       No Additional Complaints Reported    The following portions of the patient's history were reviewed and updated as appropriate:vital signs, allergies, current medications, past medical history, past social history, past surgical history and problem list      Review of Systems   Pertinent items are noted in HPI.     Objective      /84   Ht 175.3 cm (69\")   Wt 132 kg (290 lb)   LMP 2022   BMI 42.83 kg/m²     Physical Exam    General:   alert and no distress   Heart: Not performed today   Lungs: Not performed today.   Breast: Not performed today   Neck: na   Abdomen: {Not performed today   CVA: Not performed today   Pelvis: External genitalia: normal general appearance  Urinary system: urethral meatus normal  Vaginal: normal mucosa without prolapse or lesions, normal without tenderness, induration or masses and normal rugae  Cervix: normal appearance and small amount of blood noted   Extremities: Not performed today   Neurologic: negative   Psychiatric: Normal affect, judgement, and mood       Lab Review   Labs: No data reviewed     Imaging   No data reviewed    Assessment & Plan     ASSESSMENT  1. Irregular bleeding    2. Postcoital bleeding    3. Screening for blood or protein in urine        PLAN  1.   Orders Placed This Encounter   Procedures   • NuSwab VG+ - Swab, Vagina   • POC Urinalysis " Dipstick   • POC Pregnancy, Urine       2. Medications prescribed this encounter:      No orders of the defined types were placed in this encounter.      3. Vaginal swab for infection. UPT negative. U/a negative. Will call with results. If all normal, plan pelvic u/s to further eval.      Shu Lopez, SCOTT  8/18/2022

## 2022-08-21 LAB
BACTERIA UR CULT: ABNORMAL
BACTERIA UR CULT: ABNORMAL

## 2022-08-23 RX ORDER — AMOXICILLIN AND CLAVULANATE POTASSIUM 875; 125 MG/1; MG/1
1 TABLET, FILM COATED ORAL EVERY 12 HOURS
Qty: 14 TABLET | Refills: 0 | Status: SHIPPED | OUTPATIENT
Start: 2022-08-23 | End: 2022-08-30

## 2022-08-26 RX ORDER — FLUCONAZOLE 150 MG/1
TABLET ORAL
Qty: 2 TABLET | Refills: 0 | Status: SHIPPED | OUTPATIENT
Start: 2022-08-26 | End: 2022-10-24

## 2022-08-31 ENCOUNTER — TELEPHONE (OUTPATIENT)
Dept: OBSTETRICS AND GYNECOLOGY | Age: 27
End: 2022-08-31

## 2022-08-31 NOTE — TELEPHONE ENCOUNTER
Regarding: FW: Follow up question  She can make lab appt and come in and drop off a sample.   ----- Message -----  From: Sarah Herrera  Sent: 8/31/2022   9:38 AM EDT  To: SCOTT Hernandez  Subject: Follow up question                               ----- Message from Sarah Herrera sent at 8/31/2022  9:38 AM EDT -----       ----- Message from Davida Quinonez to Shu Lopez APRN sent at 8/30/2022  8:27 PM -----   Thank you! I am still having trouble with itching. I also was wondering if I can come get checked to make sure I got rid of the uti because this is my 4th in 4 months.      ----- Message -----       From:SCOTT Hernandez       Sent:8/26/2022  8:38 AM EDT         To:Davida Quinonez    Subject:Follow up question    I have sent a prescription for a yeast infection to your pharmacy on file. Have a good weekend!    Shu      ----- Message -----       From:Davida Quinonez       Sent:8/25/2022  5:31 PM EDT         To:SCOTT Hernandez    Subject:Follow up question    Hello,    I now think I have a yeast infection (I’m itching).

## 2022-09-02 ENCOUNTER — CLINICAL SUPPORT (OUTPATIENT)
Dept: OBSTETRICS AND GYNECOLOGY | Age: 27
End: 2022-09-02

## 2022-09-02 DIAGNOSIS — R39.9 UTI SYMPTOMS: ICD-10-CM

## 2022-09-02 DIAGNOSIS — Z01.89 ENCOUNTER FOR URINE TEST: Primary | ICD-10-CM

## 2022-09-02 LAB
BILIRUB BLD-MCNC: NEGATIVE MG/DL
CLARITY, POC: CLEAR
COLOR UR: YELLOW
GLUCOSE UR STRIP-MCNC: NEGATIVE MG/DL
KETONES UR QL: NEGATIVE
LEUKOCYTE EST, POC: NEGATIVE
NITRITE UR-MCNC: NEGATIVE MG/ML
PH UR: 6.5 [PH] (ref 5–8)
PROT UR STRIP-MCNC: NEGATIVE MG/DL
RBC # UR STRIP: NEGATIVE /UL
SP GR UR: 1.03 (ref 1–1.03)
UROBILINOGEN UR QL: NORMAL

## 2022-09-02 PROCEDURE — 81002 URINALYSIS NONAUTO W/O SCOPE: CPT | Performed by: NURSE PRACTITIONER

## 2022-09-06 LAB
BACTERIA UR CULT: ABNORMAL

## 2022-09-07 RX ORDER — AMOXICILLIN 500 MG/1
500 CAPSULE ORAL 2 TIMES DAILY
Qty: 14 CAPSULE | Refills: 0 | Status: SHIPPED | OUTPATIENT
Start: 2022-09-07 | End: 2022-09-14

## 2022-09-26 ENCOUNTER — OFFICE VISIT (OUTPATIENT)
Dept: SLEEP MEDICINE | Facility: HOSPITAL | Age: 27
End: 2022-09-26

## 2022-09-26 VITALS
WEIGHT: 293 LBS | BODY MASS INDEX: 43.4 KG/M2 | HEIGHT: 69 IN | DIASTOLIC BLOOD PRESSURE: 77 MMHG | SYSTOLIC BLOOD PRESSURE: 132 MMHG | OXYGEN SATURATION: 99 % | HEART RATE: 70 BPM

## 2022-09-26 DIAGNOSIS — G47.33 OSA (OBSTRUCTIVE SLEEP APNEA): Primary | ICD-10-CM

## 2022-09-26 PROCEDURE — G0463 HOSPITAL OUTPT CLINIC VISIT: HCPCS

## 2022-09-26 NOTE — PROGRESS NOTES
CONSULT NOTE    Patient Identification:  Davida Quinonez  27 y.o.  female  1995  [id number]            Requesting physician: Dr. Wheatley    Reason for Consultation: SANDOVAL evaluation    CC:     History of Present Illness:  She is very pleasant female 27-year-old I had seen her 3 years ago and diagnosed her with SANDOVAL mild.  CPAP was prescribed.  At that time her AHI was 6.9 events per hour slight worsening with supine positioning.  She actually lost a lot of weight and then gained most of it back.  Currently she reports fatigue possibly snoring but no clear history of witnessed apnea.  Feels tired unrested in the morning also has some depression anxiety ADHD.  No alcohol use no parasomnias or restless leg symptoms.  Goes to bed 8 gets up 6 gets about 10 hours of sleep and still tired reports sweating in her sleep and grinding her teeth.      Review of Systems  As above rest is negative  Past Medical History:  Past Medical History:   Diagnosis Date   • Abnormal Pap smear of cervix    • ADHD (attention deficit hyperactivity disorder)     never medicated   • Anxiety     not medicated during pregnancy   • Arthritis    • Clinical diagnosis of COVID-19 2022    HEADACHE ONLY   • Depression     not medicated during pregnancy   • HPV in female    • Impingement syndrome of left shoulder    • Miscarriage    • Sleep apnea     DOESN'T USE MACHINE... LOST 126LBS. JORGE LUIS. FOR NEW SLEEP STUDY       Past Surgical History:  Past Surgical History:   Procedure Laterality Date   • ADENOIDECTOMY     •  SECTION N/A 2021    Procedure:  SECTION PRIMARY;  Surgeon: Yadi Barrera MD;  Location: [facility] LABOR DELIVERY;  Service: Obstetrics/Gynecology;  Laterality: N/A;   • D & C WITH SUCTION N/A 2020    Procedure: DILATATION AND CURETTAGE WITH SUCTION;  Surgeon: Yadi Barrera MD;  Location: [facility] MAIN OR;  Service: Obstetrics/Gynecology;  Laterality: N/A;   • EAR TUBES     • JOINT MANIPULATION  "Left 7/26/2022    Procedure: LEFT SHOULDER MANIPULATION;  Surgeon: Navid Hinds MD;  [place] [facility] [place] [facility]  Service: Orthopedics;  Laterality: Left;   • LAPAROSCOPIC CHOLECYSTECTOMY     • SHOULDER ARTHROSCOPY W/ SUPERIOR LABRAL ANTERIOR POSTERIOR REPAIR Left 03/29/2022    Procedure: LEFT SHOULDER ARTHROSCOPY LBARAL REPAIR WITH DISTAL CLAVICLE EXCISION AND SUBACROMIAL DECOMPRESSION;  Surgeon: Navid Hinds MD;  [place] [facility] [place] [facility]  Service: Orthopedics;  Laterality: Left;   • TONSILLECTOMY     • WISDOM TOOTH EXTRACTION          Home Meds:  (Not in a hospital admission)      Allergies:  No Known Allergies    Social History:   Social History     Socioeconomic History   • Marital status:    Tobacco Use   • Smoking status: Never Smoker   • Smokeless tobacco: Never Used   Vaping Use   • Vaping Use: Never used   Substance and Sexual Activity   • Alcohol use: No   • Drug use: Never   • Sexual activity: Yes     Partners: Male     Birth control/protection: None       Family History:  Family History   Problem Relation Age of Onset   • Thyroid disease Mother    • Depression Mother    • Hypertension Father    • Hyperlipidemia Father    • Cancer Maternal Grandmother    • Pancreatic cancer Maternal Grandmother    • Pancreatitis Maternal Grandmother    • No Known Problems Brother    • No Known Problems Sister    • Ovarian cancer Other    • Malig Hyperthermia Neg Hx    • Breast cancer Neg Hx    • Uterine cancer Neg Hx    • Colon cancer Neg Hx        Physical Exam:  /77   Pulse 70   Ht 175.3 cm (69\")   Wt 134 kg (295 lb)   SpO2 99%   BMI 43.56 kg/m²  Body mass index is 43.56 kg/m². 99% 134 kg (295 lb)  Physical Exam  Patient is examined using the personal protective equipment as per guidelines from infection control for this particular patient as enacted.  Hand hygiene was performed before and after patient interaction.  Well-developed normal body habitus  Eyes normal conjunctivae and pupils " reactive to light  ENT Mallampati between 3 and 4 normal nasal exam  Neck midline trachea no thyromegaly  Chest no labored breathing clear  CVS regular rate and rhythm no lower extremity edema  Abdomen soft nontender no hepatosplenomegaly  CNS intact normal sensory exam  Skin no rashes no nodules  Psych oriented to time place and person normal memory  Musculoskeletal no cyanosis no clubbing normal range of motion        LABS:  Lab Results   Component Value Date    CALCIUM 9.1 07/22/2022       No results found for: CKTOTAL, CKMB, CKMBINDEX, TROPONINI, TROPONINT                              Lab Results   Component Value Date    TSH 1.810 07/01/2020     CrCl cannot be calculated (Patient's most recent lab result is older than the maximum 30 days allowed.).         Imaging: I personally visualized the images of scans/x-rays performed within last 3 days.      Assessment:  SANDOVAL  Obesity  Depression anxiety      Recommendations:  At this time we have a young female with obesity airway anatomy consistent with SANDOVAL  Discussed pathophysiology consequence of untreated sleep apnea.  Patient agreeable wishing to proceed for home sleep study.  We will see what the repeat sleep study shows and then decide moving forward  Extensively educated on SANDOVAL and importance of using CPAP  Weight loss encouraged  Treatment of underlying comorbidities  Follow-up after home sleep study              Joanna Marsh MD  9/26/2022  08:26 EDT      Much of this encounter note is an electronic transcription/translation of spoken language to printed text using Dragon Software.

## 2022-10-12 ENCOUNTER — OFFICE VISIT (OUTPATIENT)
Dept: OBSTETRICS AND GYNECOLOGY | Age: 27
End: 2022-10-12

## 2022-10-12 VITALS
HEIGHT: 69 IN | SYSTOLIC BLOOD PRESSURE: 126 MMHG | DIASTOLIC BLOOD PRESSURE: 78 MMHG | WEIGHT: 293 LBS | BODY MASS INDEX: 43.4 KG/M2

## 2022-10-12 DIAGNOSIS — N39.0 RECURRENT UTI: ICD-10-CM

## 2022-10-12 DIAGNOSIS — R30.0 DYSURIA: ICD-10-CM

## 2022-10-12 DIAGNOSIS — N92.6 IRREGULAR MENSES: Primary | ICD-10-CM

## 2022-10-12 DIAGNOSIS — Z13.89 SCREENING FOR BLOOD OR PROTEIN IN URINE: ICD-10-CM

## 2022-10-12 LAB
BILIRUB BLD-MCNC: NEGATIVE MG/DL
CLARITY, POC: CLEAR
COLOR UR: YELLOW
GLUCOSE UR STRIP-MCNC: NEGATIVE MG/DL
KETONES UR QL: NEGATIVE
LEUKOCYTE EST, POC: NEGATIVE
NITRITE UR-MCNC: NEGATIVE MG/ML
PH UR: 6.5 [PH] (ref 5–8)
PROT UR STRIP-MCNC: NEGATIVE MG/DL
RBC # UR STRIP: NEGATIVE /UL
SP GR UR: 1.02 (ref 1–1.03)
UROBILINOGEN UR QL: NORMAL

## 2022-10-12 PROCEDURE — 99214 OFFICE O/P EST MOD 30 MIN: CPT | Performed by: NURSE PRACTITIONER

## 2022-10-12 PROCEDURE — 81002 URINALYSIS NONAUTO W/O SCOPE: CPT | Performed by: NURSE PRACTITIONER

## 2022-10-12 NOTE — PROGRESS NOTES
"Subjective     Chief Complaint   Patient presents with   • Gynecologic Exam     Having frequent UTI, irregular menses       Davida Quinonez is a 27 y.o.  whose LMP is Patient's last menstrual period was 2021.     Pt presents today with chief complaint of irregular menses and frequent UTI's  She was seen in august with similar c/o's  She has had two previous culture proven UTI's in the last few months  She states she has had UTI since march  She does not have symptoms but culture continues to show + for UTI  Swab was negative for infection  She stopped drinking all sodas 3 weeks ago so hoping this will help    She has also had irregular menses over the past 6 months  Desires pregnancy in near future  She states the times she has skipped her periods she has been sick      No Additional Complaints Reported    The following portions of the patient's history were reviewed and updated as appropriate:vital signs, allergies, current medications, past medical history, past social history, past surgical history and problem list      Review of Systems   Pertinent items are noted in HPI.     Objective      /78   Ht 175.3 cm (69\")   Wt 134 kg (296 lb)   LMP 2021   BMI 43.71 kg/m²     Physical Exam    General:   alert and no distress   Heart: Not performed today   Lungs: Not performed today.   Breast: Not performed today   Neck: na   Abdomen: {Not performed today   CVA: Not performed today   Pelvis: Not performed today   Extremities: Not performed today   Neurologic: negative   Psychiatric: Normal affect, judgement, and mood       Lab Review   Labs: UA     Imaging   No data reviewed    Assessment & Plan     ASSESSMENT  1. Irregular menses    2. Screening for blood or protein in urine    3. Dysuria    4. Recurrent UTI        PLAN  1.   Orders Placed This Encounter   Procedures   • Urine Culture - Urine, Urine, Clean Catch   • TSH Rfx On Abnormal To Free T4   • Prolactin   • Testosterone (Free & " Total), LC / MS   • Hemoglobin A1c   • Ambulatory Referral to Urology   • POC Urinalysis Dipstick       2. Medications prescribed this encounter:      No orders of the defined types were placed in this encounter.      3. Recheck urine culture. Referral placed to urology for further evaluation. Will check labs today for irregular menses and plan pelvic US.    SCOTT Hernandez  10/12/2022

## 2022-10-14 LAB
BACTERIA UR CULT: NO GROWTH
BACTERIA UR CULT: NORMAL

## 2022-10-15 LAB
HBA1C MFR BLD: 5.8 % (ref 4.8–5.6)
PROLACTIN SERPL-MCNC: 8.5 NG/ML (ref 4.8–23.3)
TESTOST FREE SERPL-MCNC: 3.1 PG/ML (ref 0–4.2)
TESTOST SERPL-MCNC: 57.9 NG/DL (ref 10–55)
TSH SERPL DL<=0.005 MIU/L-ACNC: 1.5 UIU/ML (ref 0.27–4.2)

## 2022-10-24 ENCOUNTER — OFFICE VISIT (OUTPATIENT)
Dept: OBSTETRICS AND GYNECOLOGY | Age: 27
End: 2022-10-24

## 2022-10-24 VITALS
HEIGHT: 69 IN | SYSTOLIC BLOOD PRESSURE: 122 MMHG | WEIGHT: 293 LBS | BODY MASS INDEX: 43.4 KG/M2 | DIASTOLIC BLOOD PRESSURE: 76 MMHG

## 2022-10-24 DIAGNOSIS — Z31.9 PATIENT DESIRES PREGNANCY: ICD-10-CM

## 2022-10-24 DIAGNOSIS — N92.6 IRREGULAR PERIODS: ICD-10-CM

## 2022-10-24 DIAGNOSIS — E28.2 PCOS (POLYCYSTIC OVARIAN SYNDROME): Primary | ICD-10-CM

## 2022-10-24 LAB
BILIRUB BLD-MCNC: NEGATIVE MG/DL
CLARITY, POC: CLEAR
COLOR UR: YELLOW
GLUCOSE UR STRIP-MCNC: NEGATIVE MG/DL
KETONES UR QL: NEGATIVE
LEUKOCYTE EST, POC: NEGATIVE
NITRITE UR-MCNC: NEGATIVE MG/ML
PH UR: 6.5 [PH] (ref 5–8)
PROT UR STRIP-MCNC: NEGATIVE MG/DL
RBC # UR STRIP: ABNORMAL /UL
SP GR UR: 1.03 (ref 1–1.03)
UROBILINOGEN UR QL: ABNORMAL

## 2022-10-24 PROCEDURE — 99213 OFFICE O/P EST LOW 20 MIN: CPT | Performed by: NURSE PRACTITIONER

## 2022-10-24 PROCEDURE — 81002 URINALYSIS NONAUTO W/O SCOPE: CPT | Performed by: NURSE PRACTITIONER

## 2022-10-24 PROCEDURE — 76830 TRANSVAGINAL US NON-OB: CPT | Performed by: OBSTETRICS & GYNECOLOGY

## 2022-10-24 NOTE — PROGRESS NOTES
"Subjective     Chief Complaint   Patient presents with   • Gynecologic Exam     Irregular periods,        Davida Quinonez is a 27 y.o.  whose LMP is Patient's last menstrual period was 10/20/2022.     Pt presents today for follow up on irregular menses  She had labs with elevated testosterone level and A1C  Her US today is normal  She is working on diet changes although at present she admits to poor eating habits  She has started working out 3 days a week  She was drinking diet sodas but has cut that out  She has been trying for pregnancy the last 3 months  Pt of Dr. Barrera    No Additional Complaints Reported    The following portions of the patient's history were reviewed and updated as appropriate:vital signs, allergies, current medications, past medical history, past social history, past surgical history and problem list      Review of Systems   Pertinent items are noted in HPI.     Objective      /76   Ht 175.3 cm (69\")   Wt 133 kg (294 lb)   LMP 10/20/2022   BMI 43.42 kg/m²     Physical Exam    General:   alert and no distress, morbidly obese   Heart: Not performed today   Lungs: Not performed today.   Breast: Not performed today   Neck: na   Abdomen: {Not performed today   CVA: Not performed today   Pelvis: Not performed today   Extremities: Not performed today   Neurologic: negative   Psychiatric: Normal affect, judgement, and mood       Lab Review   Labs: Last appt labs rev'd     Imaging   Ultrasound - Pelvic Vaginal    Assessment & Plan     ASSESSMENT  1. PCOS (polycystic ovarian syndrome)    2. Irregular periods    3. Patient desires pregnancy        PLAN  1.   Orders Placed This Encounter   Procedures   • POC Urinalysis Dipstick       2. Medications prescribed this encounter:      No orders of the defined types were placed in this encounter.      3. Info given on PCOS. Discussed diet, weight loss and lifestyle changes. Recommend start inositol supplement for PCOS. Briefly discussed " ovulation induction if needed. She has annual exam with Dr Barrera in 2 months.    Follow up: 2 months    SCOTT Hernandez  10/24/2022

## 2022-11-23 ENCOUNTER — HOSPITAL ENCOUNTER (OUTPATIENT)
Dept: SLEEP MEDICINE | Facility: HOSPITAL | Age: 27
Discharge: HOME | End: 2022-11-23
Admitting: INTERNAL MEDICINE

## 2022-11-23 DIAGNOSIS — G47.33 OSA (OBSTRUCTIVE SLEEP APNEA): ICD-10-CM

## 2022-11-23 PROCEDURE — 95806 SLEEP STUDY UNATT&RESP EFFT: CPT

## 2023-01-16 ENCOUNTER — OFFICE VISIT (OUTPATIENT)
Dept: SLEEP MEDICINE | Facility: HOSPITAL | Age: 28
End: 2023-01-16
Payer: COMMERCIAL

## 2023-01-16 ENCOUNTER — APPOINTMENT (OUTPATIENT)
Dept: SLEEP MEDICINE | Facility: HOSPITAL | Age: 28
End: 2023-01-16
Payer: COMMERCIAL

## 2023-01-16 VITALS — HEART RATE: 84 BPM | OXYGEN SATURATION: 97 % | HEIGHT: 69 IN | WEIGHT: 293 LBS | BODY MASS INDEX: 43.4 KG/M2

## 2023-01-16 DIAGNOSIS — Z99.89 OSA ON CPAP: Primary | ICD-10-CM

## 2023-01-16 DIAGNOSIS — G47.33 OSA ON CPAP: Primary | ICD-10-CM

## 2023-01-16 PROCEDURE — G0463 HOSPITAL OUTPT CLINIC VISIT: HCPCS

## 2023-01-16 NOTE — PROGRESS NOTES
"      [facility]         Dr Joanna Marsh  [unfilled]  Patient Care Team:  Laurie Wheatley APRN as PCP - General (Family Medicine)  Barrera, Yadi Hurtado MD as Obstetrician (Obstetrics and Gynecology)    Chief Complaint:Mild SANDOVAL apnea index 5 events per hour  Sleep-related hypoxemia with some artifacts noted on oxygen saturation monitor possibly not accurate.  Minimal snoring noted    Interval History: She is here to discuss sleep study result and further management options.  As you recall she had lost significant weight and we had repeated a home sleep study which is now consistent with mild sleep apnea.  She actually feels better.  She has not lost any more weight since last visit.  Goes to bed 9 gets of 6 gets about 9+ hours of sleep and feels mostly rested in the morning.  No tobacco no alcohol or caffeine abuse.  She has been diagnosed with PCOS since last visit.  Currently has a nasal mask that fits well.  [address] 2 out of 24 within normal limits    REVIEW OF SYSTEMS:   CARDIOVASCULAR: No chest pain, chest pressure or chest discomfort. No palpitations or edema.   RESPIRATORY: No shortness of breath, cough or sputum.   GASTROINTESTINAL: No anorexia, nausea, vomiting or diarrhea. No abdominal pain or blood.   HEMATOLOGIC: No bleeding or bruising.     Ventilator/Non-Invasive Ventilation Settings (From admission, onward)    None            Vital Signs  Heart Rate:  [84] 84  [unfilled]  Flowsheet Rows    Flowsheet Row First Filed Value   Admission Height 175.3 cm (69.02\") Documented at 01/16/2023 0900   Admission Weight 136 kg (299 lb) Documented at 01/16/2023 0900          Physical Exam:  Patient is examined using the personal protective equipment as per guidelines from infection control for this particular patient as enacted.  Hand hygiene was performed before and after patient interaction.   General Appearance:    Alert, cooperative, in no acute distress.  Following simple commands  ENT " Mallampati between 3 and 4 no nasal congestion  Neck midline trachea, no thyromegaly   Lungs:     Clear to auscultation, respirations regular, even and                  unlabored    Heart:    Regular rhythm and normal rate, normal S1 and S2, no            murmur, no gallop, no rub, no click   Chest Wall:    No abnormalities observed   Abdomen:     Normal bowel sounds, no masses, no organomegaly, soft        nontender, nondistended, no guarding, no rebound                tenderness   Extremities:   Moves all extremities well, no edema, no cyanosis, no             redness  CNS no focal neurological deficits normal sensory exam  Skin no rashes no nodules  Musculoskeletal no cyanosis no clubbing normal range of motion     Results Review:                                          I reviewed the patient's new clinical results.  I personally viewed and interpreted the patient's chest x-ray.        Medication Review:       No current facility-administered medications for this visit.      ASSESSMENT:   SANDOVAL  Obesity  Depression anxiety    PLAN:  I reviewed sleep study result in detail with the patient.  Based on her index and BMI and some subtle symptoms I would recommend getting back on the CPAP auto 8 to 20 cm  Sleep hygiene measures reinforced  Weight loss encouraged  We will reevaluate in 6 months and if she has lost significant weight repeat home sleep study can be done to see if she has residual SANDOVAL  Treatment of underlying comorbidities  We will see her back in 6 months      Joanna Marsh MD  01/16/23  09:19 EST

## 2023-01-17 ENCOUNTER — OFFICE VISIT (OUTPATIENT)
Dept: OBSTETRICS AND GYNECOLOGY | Age: 28
End: 2023-01-17
Payer: COMMERCIAL

## 2023-01-17 VITALS
BODY MASS INDEX: 43.4 KG/M2 | SYSTOLIC BLOOD PRESSURE: 128 MMHG | WEIGHT: 293 LBS | HEIGHT: 69 IN | DIASTOLIC BLOOD PRESSURE: 84 MMHG

## 2023-01-17 DIAGNOSIS — R79.89 ELEVATED TESTOSTERONE LEVEL: ICD-10-CM

## 2023-01-17 DIAGNOSIS — N92.6 IRREGULAR BLEEDING: ICD-10-CM

## 2023-01-17 DIAGNOSIS — R73.09 ELEVATED HEMOGLOBIN A1C: ICD-10-CM

## 2023-01-17 DIAGNOSIS — E28.2 PCOS (POLYCYSTIC OVARIAN SYNDROME): ICD-10-CM

## 2023-01-17 DIAGNOSIS — N92.6 IRREGULAR MENSES: Primary | ICD-10-CM

## 2023-01-17 LAB
B-HCG UR QL: NEGATIVE
EXPIRATION DATE: NORMAL
INTERNAL NEGATIVE CONTROL: NORMAL
INTERNAL POSITIVE CONTROL: NORMAL
Lab: NORMAL

## 2023-01-17 PROCEDURE — 81025 URINE PREGNANCY TEST: CPT | Performed by: PHYSICIAN ASSISTANT

## 2023-01-17 PROCEDURE — 99213 OFFICE O/P EST LOW 20 MIN: CPT | Performed by: PHYSICIAN ASSISTANT

## 2023-01-17 RX ORDER — MEDROXYPROGESTERONE ACETATE 10 MG/1
10 TABLET ORAL DAILY
Qty: 10 TABLET | Refills: 3 | Status: SHIPPED | OUTPATIENT
Start: 2023-01-17

## 2023-01-17 NOTE — PROGRESS NOTES
"Subjective     Chief Complaint   Patient presents with   • Menstrual Problem     gyn follow up with ultrasound, no cycle since october. (pt has PCOS)       Davida Quinonez is a 27 y.o.  whose LMP is Patient's last menstrual period was 10/20/2022 (exact date). presents with irregular bleeding    She is here for pelvic u/s d/t no menses since October  She had regular periods her whole life, since onset of her menses at 10  Then got the flu and covid last year and has since noted irregular periods  Was seen by Shu last year, October, and had some labs done  Was diagnosed with PCOS based off an elevated A1C and elevated testosterone level  Advised to start inositol and feels she hasn't had a period since she started that    Is trying for pregnancy and has been since july  Has h/o  in   SAB in   Same partner    Known bicornuate uterus    Pt of Dr mckeon  New to me with this concern    No Additional Complaints Reported    The following portions of the patient's history were reviewed and updated as appropriate:vital signs, allergies, current medications, past medical history, past social history, past surgical history and problem list      Review of Systems   Pertinent items are noted in HPI.  Genitourinary:positive for irregular menses, absence of menstruation since October, h/o PCOS     Objective      /84   Ht 175.3 cm (69\")   Wt 135 kg (297 lb)   LMP 10/20/2022 (Exact Date)   BMI 43.86 kg/m²     Physical Exam    General:   alert, comfortable and no distress   Heart: Not performed today   Lungs: Not performed today.   Breast: Not performed today   Neck: Not performed today   Abdomen: Not performed today   CVA: Not performed today   Pelvis: Not performed today   Extremities: Not performed today   Neurologic: negative   Psychiatric: Normal affect, judgement, and mood       Lab Review   Labs: Urine pregnancy test    Imaging   Ultrasound - Pelvic Vaginal    1.  Uterus: Normal size, " Anteverted and bicrornuate     2.  Endometrium: Normal non-menopausal thickness and 7 mm      3.  Myometrium: Normal homogenous texture      4.  Ovaries               Left: Normal/unremarkable  and with ovarian volume: 4.9 ml                Right: Normal/unremarkable , Normal small follicles and with ovarian volume: 16 ml     Assessment & Plan     ASSESSMENT  1. Irregular menses    2. Irregular bleeding    3. PCOS (polycystic ovarian syndrome)    4. Elevated hemoglobin A1c    5. Elevated testosterone level          PLAN  1.   Orders Placed This Encounter   Procedures   • Testosterone - total   • Hemoglobin A1c   • POC Pregnancy, Urine       2. Medications prescribed this encounter:        New Medications Ordered This Visit   Medications   • metFORMIN (Glucophage) 500 MG tablet     Sig: Take 1 tablet by mouth 3 (Three) Times a Day.     Dispense:  30 tablet     Refill:  11   • medroxyPROGESTERone (Provera) 10 MG tablet     Sig: Take 1 tablet by mouth Daily.     Dispense:  10 tablet     Refill:  3       3. Disc u/s findings which are wnl. I don't see any signs of pcos on her ovaries and pt only notes an occl chin hair. She is working on weight loss currently. She desires pregnancy. Will start metformin to treat her elevated A1C and help with regulating ovulation. Will check labs as well. Plan provera withdrawal. Recheck a1c and testosterone level as well    Follow up: MANA Hurtado  1/17/2023

## 2023-01-18 LAB
HBA1C MFR BLD: 5.8 % (ref 4.8–5.6)
TESTOST SERPL-MCNC: 41 NG/DL (ref 13–71)

## 2023-02-06 ENCOUNTER — TELEPHONE (OUTPATIENT)
Dept: OBSTETRICS AND GYNECOLOGY | Age: 28
End: 2023-02-06
Payer: COMMERCIAL

## 2023-02-06 NOTE — TELEPHONE ENCOUNTER
Caller: Davida Quinonez    Relationship: Self    Best call back number: [phone]    What medications are you currently taking:   Current Outpatient Medications on File Prior to Visit   Medication Sig Dispense Refill   • medroxyPROGESTERone (Provera) 10 MG tablet Take 1 tablet by mouth Daily. 10 tablet 3   • metFORMIN (Glucophage) 500 MG tablet Take 1 tablet by mouth 3 (Three) Times a Day. 30 tablet 11   • multivitamin with minerals tablet tablet Take 1 tablet by mouth Daily. PT HOLDING FOR SURGERY       No current facility-administered medications on file prior to visit.          When did you start taking these medications: 01/17    Which medication are you concerned about: metFORMIN (Glucophage) 500 MG tablet       Who prescribed you this medication: SCOTT WALKER     What are your concerns: OVERALL FEELS DIZZY, FEEL OFF.     How long have you had these concerns: SINCE Saturday 4TH    OK TO CALLBACK ANYTIME, OK TO LEAVE A .

## 2023-02-10 ENCOUNTER — TELEPHONE (OUTPATIENT)
Dept: SLEEP MEDICINE | Facility: HOSPITAL | Age: 28
End: 2023-02-10
Payer: COMMERCIAL

## 2023-02-10 NOTE — TELEPHONE ENCOUNTER
informed by DME that pts needs to bring SD for compliance download needed by insurance . Voiding supply order until pt brings data card

## 2023-03-13 NOTE — TELEPHONE ENCOUNTER
Ok to schedule tomorrow for swab. If there is any concern for leaking fluid advise evaluation on labor and delivery. Thank you. Continue trach collar. Titrate oxygen as tolerated.  Maintain sat at or above 92%  Monitor oxygen saturation  Continue bronchodilators  Empirically being covered with Vanco and Meropenem antibiotics for pna/Septicemia.   F/U cultures from 3/9 - thus far negative to date.

## 2023-04-10 ENCOUNTER — OFFICE VISIT (OUTPATIENT)
Dept: OBSTETRICS AND GYNECOLOGY | Age: 28
End: 2023-04-10
Payer: COMMERCIAL

## 2023-04-10 VITALS
WEIGHT: 292.6 LBS | DIASTOLIC BLOOD PRESSURE: 84 MMHG | SYSTOLIC BLOOD PRESSURE: 126 MMHG | HEIGHT: 69 IN | BODY MASS INDEX: 43.34 KG/M2

## 2023-04-10 DIAGNOSIS — Z01.419 ENCOUNTER FOR GYNECOLOGICAL EXAMINATION: ICD-10-CM

## 2023-04-10 DIAGNOSIS — E28.2 PCOS (POLYCYSTIC OVARIAN SYNDROME): Primary | ICD-10-CM

## 2023-04-10 PROCEDURE — 99395 PREV VISIT EST AGE 18-39: CPT | Performed by: NURSE PRACTITIONER

## 2023-04-10 RX ORDER — PRENATAL VIT NO.126/IRON/FOLIC 28MG-0.8MG
1 TABLET ORAL DAILY
COMMUNITY

## 2023-04-10 NOTE — PROGRESS NOTES
Subjective       History of Present Illness    Chief Complaint   Patient presents with   • Gynecologic Exam     annual. last pap 12/15/21 (neg). Pt has no complaints.        Davida Quinonez is a 28 y.o. female who presents for annual exam.  Patient of Dr. Bruce SMITH since July  Has gained weight and was diagnosed with PCOS last year  Taking metformin once daily  CYcles are regular currently and she believes she is ovulating      OB History    Para Term  AB Living   2 1 1 0 1 1   SAB IAB Ectopic Molar Multiple Live Births   1 0 0 0 0 1      # Outcome Date GA Lbr Celestine/2nd Weight Sex Delivery Anes PTL Lv   2 Term 21 38w3d  4080 g (8 lb 15.9 oz) M CS-LTranv Spinal N MIRIAM      Birth Comments: Alexei 1   1 SAB 20 7w3d              The following portions of the patient's history were reviewed and updated as appropriate: allergies, current medications, past family history, past medical history, past social history, past surgical history and problem list.    Her menses are regular every 28-30 days, lasting 4-7 days.    Current contraception: none  History of abnormal Pap smear: yes - years ago  Perform regular self breast exam: no  Family history of uterine or ovarian cancer: no  Family History of colon cancer: no  Family history of breast cancer: no    Mammogram: not indicated.  Colonoscopy: not indicated.  DEXA: not indicated.  Last Pap:    Social History    Tobacco Use      Smoking status: Never      Smokeless tobacco: Never    Exercise: not active  Calcium/Vitamin D: uses supplements    The following portions of the patient's history were reviewed and updated as appropriate: allergies, current medications, past family history, past medical history, past social history, past surgical history and problem list.    Review of Systems   Constitutional: Negative.    HENT: Negative.    Eyes: Negative.    Respiratory: Negative.    Cardiovascular: Negative.    Gastrointestinal: Negative.    Endocrine:  "Negative.    Genitourinary: Negative.    Musculoskeletal: Negative.    Skin: Negative.    Allergic/Immunologic: Negative.    Neurological: Negative.    Hematological: Negative.    Psychiatric/Behavioral: Negative.          Objective   Physical Exam  Vitals reviewed.   Constitutional:       Appearance: She is well-developed.   Neck:      Thyroid: No thyroid mass.   Cardiovascular:      Rate and Rhythm: Normal rate and regular rhythm.      Heart sounds: Normal heart sounds.   Pulmonary:      Effort: Pulmonary effort is normal.      Breath sounds: Normal breath sounds.   Chest:   Breasts:     Right: No mass, nipple discharge, skin change or tenderness.      Left: No mass, nipple discharge, skin change or tenderness.   Abdominal:      Palpations: Abdomen is soft.      Tenderness: There is no abdominal tenderness.   Genitourinary:     Labia:         Right: No rash or lesion.         Left: No rash or lesion.       Vagina: Normal.      Cervix: No cervical motion tenderness, discharge or friability.      Adnexa:         Right: No mass or tenderness.          Left: No mass or tenderness.     Neurological:      Mental Status: She is alert and oriented to person, place, and time.   Psychiatric:         Behavior: Behavior normal.         /84   Ht 175.3 cm (69\")   Wt 133 kg (292 lb 9.6 oz)   LMP 01/27/2023 (Approximate)   BMI 43.21 kg/m²     Assessment & Plan   Diagnoses and all orders for this visit:    1. PCOS (polycystic ovarian syndrome) (Primary)    2. Encounter for gynecological examination  -     IGP,rfx Aptima HPV All Pth; Future          Breast self exam technique reviewed and patient encouraged to perform self-exam monthly.  Discussed healthy lifestyle modifications.  Pap smear done with reflex HPV  Recommended 30 minutes of aerobic exercise five times per week.  Discussed calcium needs to prevent osteoporosis  Encouraged regular exercise and diet for weight loss and fertility   Continue tracking cycle and " OPKs with timed IC

## 2023-04-14 LAB
CONV .: NORMAL
CYTOLOGIST CVX/VAG CYTO: NORMAL
CYTOLOGY CVX/VAG DOC CYTO: NORMAL
CYTOLOGY CVX/VAG DOC THIN PREP: NORMAL
DX ICD CODE: NORMAL
HIV 1 & 2 AB SER-IMP: NORMAL
OTHER STN SPEC: NORMAL
STAT OF ADQ CVX/VAG CYTO-IMP: NORMAL

## 2023-04-28 ENCOUNTER — TELEPHONE (OUTPATIENT)
Dept: OBSTETRICS AND GYNECOLOGY | Age: 28
End: 2023-04-28
Payer: COMMERCIAL

## 2023-04-28 ENCOUNTER — TELEPHONE (OUTPATIENT)
Dept: OBSTETRICS AND GYNECOLOGY | Age: 28
End: 2023-04-28

## 2023-04-28 NOTE — TELEPHONE ENCOUNTER
Regarding: Metformin  Contact: [phone]  ----- Message from Kenisha Cerda MA sent at 4/27/2023 10:18 AM EDT -----       ----- Message from Davida Quinonez Jennifer Heer, MD sent at 4/27/2023  9:57 AM -----   Should I continue my metformin?

## 2023-04-28 NOTE — TELEPHONE ENCOUNTER
PROVIDER: DR. TAY  (FOR DR. RACHEL RAMIREZ)    Caller: Davida Quinonez    Relationship: Self    Best call back number: [phone]    What is the best time to reach you: ANY    Who are you requesting to speak with (clinical staff, provider,  specific staff member): CONCHA    Do you know the name of the person who called: CONCHA    What was the call regarding: QUESTION CALLER HAD EARLIER    Do you require a callback: YES

## 2023-04-28 NOTE — TELEPHONE ENCOUNTER
Patient wanted to know if she can continue to take metformin while pregnant ( advised her yes per dr Barrera ) - pregnancy confirmation scheduled 6/5/23 .

## 2023-05-05 ENCOUNTER — OFFICE VISIT (OUTPATIENT)
Dept: FAMILY MEDICINE CLINIC | Facility: CLINIC | Age: 28
End: 2023-05-05
Payer: COMMERCIAL

## 2023-05-05 VITALS
RESPIRATION RATE: 18 BRPM | BODY MASS INDEX: 43.1 KG/M2 | OXYGEN SATURATION: 98 % | SYSTOLIC BLOOD PRESSURE: 118 MMHG | HEIGHT: 69 IN | WEIGHT: 291 LBS | TEMPERATURE: 97.8 F | HEART RATE: 89 BPM | DIASTOLIC BLOOD PRESSURE: 80 MMHG

## 2023-05-05 DIAGNOSIS — Z3A.01 LESS THAN 8 WEEKS GESTATION OF PREGNANCY: ICD-10-CM

## 2023-05-05 DIAGNOSIS — Z00.00 ANNUAL PHYSICAL EXAM: Primary | ICD-10-CM

## 2023-05-05 DIAGNOSIS — E28.2 PCOS (POLYCYSTIC OVARIAN SYNDROME): ICD-10-CM

## 2023-05-05 PROCEDURE — 99395 PREV VISIT EST AGE 18-39: CPT | Performed by: NURSE PRACTITIONER

## 2023-05-05 NOTE — PROGRESS NOTES
"Chief Complaint  Annual Exam (Physical )    Subjective        Davida Quinonez presents to [facility]  History of Present Illness      The patient presents to the clinic for an annual physical exam.    She states she is 5 weeks pregnant. She notes she has an appointment with her OB/GYN on 06/05/2023. The patient reports she is utilizing prenatal vitamins. She states she is still utilizing metformin. She notes her last Pap smear was in 04/2023. She denies being pregnant when she had COVID-19. The patient reports she has not had a menstrual cycle since 10/2022. She notes she had COVID-19 in 06/2022.    Her blood pressure today is 118/80 mmHg.    The patient reports she has postpartum anxiety and depression.    She has had 3 COVID-19 vaccines.    Objective   Vital Signs:  /80   Pulse 89   Temp 97.8 °F (36.6 °C)   Resp 18   Ht 175.3 cm (69\")   Wt 132 kg (291 lb)   SpO2 98%   BMI 42.97 kg/m²   Estimated body mass index is 42.97 kg/m² as calculated from the following:    Height as of this encounter: 175.3 cm (69\").    Weight as of this encounter: 132 kg (291 lb).          Physical Exam  Vitals reviewed.   Constitutional:       Appearance: Normal appearance.   HENT:      Right Ear: Tympanic membrane and ear canal normal.      Left Ear: Tympanic membrane and ear canal normal.      Mouth/Throat:      Mouth: Mucous membranes are moist.      Pharynx: Oropharynx is clear.   Eyes:      Conjunctiva/sclera: Conjunctivae normal.      Pupils: Pupils are equal, round, and reactive to light.   Cardiovascular:      Rate and Rhythm: Normal rate and regular rhythm.      Heart sounds: No murmur heard.    No friction rub. No gallop.   Pulmonary:      Effort: Pulmonary effort is normal. No respiratory distress.      Breath sounds: Normal breath sounds. No wheezing, rhonchi or rales.   Abdominal:      General: Bowel sounds are normal. There is no distension.      Palpations: Abdomen is soft. " There is no mass.      Tenderness: There is no abdominal tenderness.      Hernia: No hernia is present.   Skin:     General: Skin is warm and dry.   Neurological:      Mental Status: She is alert and oriented to person, place, and time.   Psychiatric:         Mood and Affect: Mood normal.        Result Review :                  Assessment and Plan   Diagnoses and all orders for this visit:    1. Annual physical exam (Primary)    2. Less than 8 weeks gestation of pregnancy    3. PCOS (polycystic ovarian syndrome)            1. Encounter for routine history and physical examination of adult (Primary)  -The patient will complete laboratory studies as discussed during this visit.    2. PCOS  -The patient will continue taking metformin as prescribed, cleared by gyn to continue during pregnancy, has follow up early June with GYN, will hold on labs currently as GYn will complete lab panel at next visit      Follow Up   No follow-ups on file.  Patient was given instructions and counseling regarding her condition or for health maintenance advice. Please see specific information pulled into the AVS if appropriate.       Transcribed from ambient dictation for SCOTT Jones by Darek Pedro.  05/05/23   11:54 EDT    Patient or patient representative verbalized consent to the visit recording.  I have personally performed the services described in this document as transcribed by the above individual, and it is both accurate and complete.

## 2023-05-11 ENCOUNTER — TELEPHONE (OUTPATIENT)
Dept: OBSTETRICS AND GYNECOLOGY | Age: 28
End: 2023-05-11
Payer: COMMERCIAL

## 2023-05-11 DIAGNOSIS — O20.9 BLEEDING IN EARLY PREGNANCY: Primary | ICD-10-CM

## 2023-05-12 ENCOUNTER — TELEPHONE (OUTPATIENT)
Dept: OBSTETRICS AND GYNECOLOGY | Age: 28
End: 2023-05-12
Payer: COMMERCIAL

## 2023-05-12 DIAGNOSIS — O20.9 BLEEDING IN EARLY PREGNANCY: Primary | ICD-10-CM

## 2023-05-12 LAB — HCG INTACT+B SERPL-ACNC: NORMAL MIU/ML

## 2023-05-12 NOTE — TELEPHONE ENCOUNTER
----- Message from MANA Lujan sent at 5/12/2023  9:44 AM EDT -----  Please let Davida know that her quant levels are c/w pregnancy of 5-6 wks. See how she is feeling or if she is still bleeding. If she is, can w/I for pelvic u/s.

## 2023-05-12 NOTE — TELEPHONE ENCOUNTER
Pt notified, she will repeat labs on Monday and when we call her with those results we will schedule her ultrasound.    Pt scheduled. Labs ordered

## 2023-05-16 LAB — HCG INTACT+B SERPL-ACNC: 9048 MIU/ML

## 2023-06-05 ENCOUNTER — OFFICE VISIT (OUTPATIENT)
Dept: OBSTETRICS AND GYNECOLOGY | Age: 28
End: 2023-06-05
Payer: COMMERCIAL

## 2023-06-05 ENCOUNTER — PREP FOR SURGERY (OUTPATIENT)
Dept: OBSTETRICS AND GYNECOLOGY | Age: 28
End: 2023-06-05

## 2023-06-05 VITALS
SYSTOLIC BLOOD PRESSURE: 125 MMHG | WEIGHT: 293 LBS | DIASTOLIC BLOOD PRESSURE: 80 MMHG | BODY MASS INDEX: 43.4 KG/M2 | HEIGHT: 69 IN

## 2023-06-05 DIAGNOSIS — Z67.91 RH NEGATIVE STATUS DURING PREGNANCY IN FIRST TRIMESTER: ICD-10-CM

## 2023-06-05 DIAGNOSIS — O02.1 MISSED ABORTION: Primary | ICD-10-CM

## 2023-06-05 DIAGNOSIS — Z13.9 SPECIAL SCREENING: ICD-10-CM

## 2023-06-05 DIAGNOSIS — O26.891 RH NEGATIVE STATUS DURING PREGNANCY IN FIRST TRIMESTER: ICD-10-CM

## 2023-06-05 DIAGNOSIS — Z13.89 SCREENING FOR BLOOD OR PROTEIN IN URINE: ICD-10-CM

## 2023-06-05 PROBLEM — O26.899 RH NEGATIVE STATUS DURING PREGNANCY: Status: ACTIVE | Noted: 2023-06-05

## 2023-06-05 LAB
B-HCG UR QL: POSITIVE
BILIRUB BLD-MCNC: NEGATIVE MG/DL
EXPIRATION DATE: ABNORMAL
GLUCOSE UR STRIP-MCNC: NEGATIVE MG/DL
INTERNAL NEGATIVE CONTROL: NEGATIVE
INTERNAL POSITIVE CONTROL: ABNORMAL
KETONES UR QL: NEGATIVE
LEUKOCYTE EST, POC: NEGATIVE
Lab: ABNORMAL
NITRITE UR-MCNC: NEGATIVE MG/ML
PH UR: 5 [PH] (ref 5–8)
PROT UR STRIP-MCNC: NEGATIVE MG/DL
RBC # UR STRIP: NEGATIVE /UL
SP GR UR: 1.02 (ref 1–1.03)
UROBILINOGEN UR QL: NORMAL

## 2023-06-05 PROCEDURE — 99214 OFFICE O/P EST MOD 30 MIN: CPT | Performed by: OBSTETRICS & GYNECOLOGY

## 2023-06-05 PROCEDURE — 81025 URINE PREGNANCY TEST: CPT | Performed by: OBSTETRICS & GYNECOLOGY

## 2023-06-05 PROCEDURE — 96372 THER/PROPH/DIAG INJ SC/IM: CPT | Performed by: OBSTETRICS & GYNECOLOGY

## 2023-06-05 RX ORDER — SODIUM CHLORIDE 0.9 % (FLUSH) 0.9 %
10 SYRINGE (ML) INJECTION EVERY 12 HOURS SCHEDULED
Status: CANCELLED | OUTPATIENT
Start: 2023-06-05

## 2023-06-05 RX ORDER — SODIUM CHLORIDE 9 MG/ML
40 INJECTION, SOLUTION INTRAVENOUS AS NEEDED
Status: CANCELLED | OUTPATIENT
Start: 2023-06-05

## 2023-06-05 RX ORDER — SCOLOPAMINE TRANSDERMAL SYSTEM 1 MG/1
1 PATCH, EXTENDED RELEASE TRANSDERMAL CONTINUOUS
Status: CANCELLED | OUTPATIENT
Start: 2023-06-05 | End: 2023-06-08

## 2023-06-05 RX ORDER — DOXYCYCLINE 100 MG/1
100 CAPSULE ORAL ONCE
Status: CANCELLED | OUTPATIENT
Start: 2023-06-05 | End: 2023-06-05

## 2023-06-05 RX ORDER — METFORMIN HYDROCHLORIDE 500 MG/1
1000 TABLET, EXTENDED RELEASE ORAL
Qty: 60 TABLET | Refills: 12 | Status: SHIPPED | OUTPATIENT
Start: 2023-06-05

## 2023-06-05 RX ORDER — SODIUM CHLORIDE 0.9 % (FLUSH) 0.9 %
10 SYRINGE (ML) INJECTION AS NEEDED
Status: CANCELLED | OUTPATIENT
Start: 2023-06-05

## 2023-06-05 RX ORDER — GABAPENTIN 300 MG/1
600 CAPSULE ORAL ONCE
Status: CANCELLED | OUTPATIENT
Start: 2023-06-07 | End: 2023-06-05

## 2023-06-05 RX ORDER — ACETAMINOPHEN 500 MG
1000 TABLET ORAL ONCE
Status: CANCELLED | OUTPATIENT
Start: 2023-06-05 | End: 2023-06-05

## 2023-06-05 NOTE — PROGRESS NOTES
"Subjective   Davida Quinonez is a 28 y.o. female who presents for pregnancy confirmation with us -  lmp 4/3/23.  Ultrasound today shows a missed  at 7-6 weeks. Denies any vag bleeding or abdominal pain .   Discussed recommendation of a D&C and patient desires to proceed. She had one in  for a missed . Discussed risks to include perforation, infection and heavy bleeding. Patient expressed understanding and desires to proceed with Suction Dilation and Curretage. Will give Rhogam today.     History of Present Illness    The following portions of the patient's history were reviewed and updated as appropriate: allergies, current medications, past family history, past medical history, past social history, past surgical history, and problem list.    Review of Systems   Constitutional:  Negative for chills, fatigue and fever.   Gastrointestinal:  Negative for abdominal pain.   Genitourinary:  Negative for menstrual problem and pelvic pain.   All other systems reviewed and are negative.  /80   Ht 175.3 cm (69\")   Wt 136 kg (299 lb)   LMP 2023 (Approximate)   BMI 44.15 kg/m²     Objective   Physical Exam  Vitals and nursing note reviewed.   Constitutional:       Appearance: Normal appearance. She is obese.   Pulmonary:      Effort: Pulmonary effort is normal.   Neurological:      Mental Status: She is alert and oriented to person, place, and time.   Psychiatric:         Mood and Affect: Mood normal.         Behavior: Behavior normal.       Assessment & Plan   Diagnoses and all orders for this visit:    1. Missed  (Primary)  -     Case Request    2. Screening for blood or protein in urine  -     POC Urinalysis Dipstick    3. Special screening  -     POC Pregnancy, Urine    4. Rh negative status during pregnancy in first trimester    Other orders  -     metFORMIN ER (GLUCOPHAGE-XR) 500 MG 24 hr tablet; Take 2 tablets by mouth Daily With Breakfast.  Dispense: 60 tablet; Refill: " 12  -     Rhogam Immune Globulin Immunization       Counseling was given to patient and mother   for the following topics: diagnostic results, impressions, risks and benefits of treatment options, and importance of treatment compliance . Total time of the encounter was 30 minutes and 25 minutes was spent counseling.

## 2023-06-07 ENCOUNTER — HOSPITAL ENCOUNTER (OUTPATIENT)
Facility: HOSPITAL | Age: 28
Setting detail: HOSPITAL OUTPATIENT SURGERY
Discharge: HOME | End: 2023-06-07
Attending: OBSTETRICS & GYNECOLOGY | Admitting: OBSTETRICS & GYNECOLOGY
Payer: COMMERCIAL

## 2023-06-07 ENCOUNTER — ANESTHESIA (OUTPATIENT)
Dept: PERIOP | Facility: HOSPITAL | Age: 28
End: 2023-06-07
Payer: COMMERCIAL

## 2023-06-07 ENCOUNTER — ANESTHESIA EVENT (OUTPATIENT)
Dept: PERIOP | Facility: HOSPITAL | Age: 28
End: 2023-06-07
Payer: COMMERCIAL

## 2023-06-07 VITALS
TEMPERATURE: 98.3 F | HEART RATE: 62 BPM | BODY MASS INDEX: 42.9 KG/M2 | SYSTOLIC BLOOD PRESSURE: 109 MMHG | RESPIRATION RATE: 16 BRPM | HEIGHT: 70 IN | OXYGEN SATURATION: 100 % | DIASTOLIC BLOOD PRESSURE: 44 MMHG

## 2023-06-07 DIAGNOSIS — O02.1 MISSED ABORTION: ICD-10-CM

## 2023-06-07 PROBLEM — Z98.890 S/P D&C (STATUS POST DILATION AND CURETTAGE): Status: ACTIVE | Noted: 2023-06-07

## 2023-06-07 PROCEDURE — S0260 H&P FOR SURGERY: HCPCS | Performed by: OBSTETRICS & GYNECOLOGY

## 2023-06-07 PROCEDURE — 25010000002 METHYLERGONOVINE MALEATE PER 0.2 MG: Performed by: NURSE ANESTHETIST, CERTIFIED REGISTERED

## 2023-06-07 PROCEDURE — 25010000002 HYDROMORPHONE PER 4 MG: Performed by: NURSE ANESTHETIST, CERTIFIED REGISTERED

## 2023-06-07 PROCEDURE — 25010000002 FENTANYL CITRATE (PF) 50 MCG/ML SOLUTION: Performed by: NURSE ANESTHETIST, CERTIFIED REGISTERED

## 2023-06-07 PROCEDURE — 88305 TISSUE EXAM BY PATHOLOGIST: CPT | Performed by: OBSTETRICS & GYNECOLOGY

## 2023-06-07 PROCEDURE — 85025 COMPLETE CBC W/AUTO DIFF WBC: CPT | Performed by: OBSTETRICS & GYNECOLOGY

## 2023-06-07 PROCEDURE — 25010000002 ONDANSETRON PER 1 MG: Performed by: NURSE ANESTHETIST, CERTIFIED REGISTERED

## 2023-06-07 PROCEDURE — 25010000002 KETOROLAC TROMETHAMINE PER 15 MG: Performed by: NURSE ANESTHETIST, CERTIFIED REGISTERED

## 2023-06-07 PROCEDURE — 25010000002 MIDAZOLAM PER 1 MG: Performed by: ANESTHESIOLOGY

## 2023-06-07 PROCEDURE — 25010000002 PROPOFOL 10 MG/ML EMULSION: Performed by: NURSE ANESTHETIST, CERTIFIED REGISTERED

## 2023-06-07 PROCEDURE — 25010000002 DEXAMETHASONE SODIUM PHOSPHATE 20 MG/5ML SOLUTION: Performed by: NURSE ANESTHETIST, CERTIFIED REGISTERED

## 2023-06-07 PROCEDURE — 59820 CARE OF MISCARRIAGE: CPT | Performed by: OBSTETRICS & GYNECOLOGY

## 2023-06-07 RX ORDER — FAMOTIDINE 10 MG/ML
20 INJECTION, SOLUTION INTRAVENOUS ONCE
Status: COMPLETED | OUTPATIENT
Start: 2023-06-07 | End: 2023-06-07

## 2023-06-07 RX ORDER — OXYCODONE AND ACETAMINOPHEN 7.5; 325 MG/1; MG/1
1 TABLET ORAL EVERY 4 HOURS PRN
Status: DISCONTINUED | OUTPATIENT
Start: 2023-06-07 | End: 2023-06-07 | Stop reason: HOSPADM

## 2023-06-07 RX ORDER — SODIUM CHLORIDE 0.9 % (FLUSH) 0.9 %
10 SYRINGE (ML) INJECTION EVERY 12 HOURS SCHEDULED
Status: DISCONTINUED | OUTPATIENT
Start: 2023-06-07 | End: 2023-06-07 | Stop reason: HOSPADM

## 2023-06-07 RX ORDER — HYDROCODONE BITARTRATE AND ACETAMINOPHEN 7.5; 325 MG/1; MG/1
1 TABLET ORAL ONCE AS NEEDED
Status: DISCONTINUED | OUTPATIENT
Start: 2023-06-07 | End: 2023-06-07 | Stop reason: HOSPADM

## 2023-06-07 RX ORDER — EPHEDRINE SULFATE 50 MG/ML
5 INJECTION, SOLUTION INTRAVENOUS ONCE AS NEEDED
Status: DISCONTINUED | OUTPATIENT
Start: 2023-06-07 | End: 2023-06-07 | Stop reason: HOSPADM

## 2023-06-07 RX ORDER — IPRATROPIUM BROMIDE AND ALBUTEROL SULFATE 2.5; .5 MG/3ML; MG/3ML
3 SOLUTION RESPIRATORY (INHALATION) ONCE AS NEEDED
Status: DISCONTINUED | OUTPATIENT
Start: 2023-06-07 | End: 2023-06-07 | Stop reason: HOSPADM

## 2023-06-07 RX ORDER — SCOLOPAMINE TRANSDERMAL SYSTEM 1 MG/1
1 PATCH, EXTENDED RELEASE TRANSDERMAL CONTINUOUS
Status: DISCONTINUED | OUTPATIENT
Start: 2023-06-07 | End: 2023-06-07 | Stop reason: HOSPADM

## 2023-06-07 RX ORDER — SODIUM CHLORIDE 0.9 % (FLUSH) 0.9 %
3 SYRINGE (ML) INJECTION EVERY 12 HOURS SCHEDULED
Status: DISCONTINUED | OUTPATIENT
Start: 2023-06-07 | End: 2023-06-07 | Stop reason: HOSPADM

## 2023-06-07 RX ORDER — MIDAZOLAM HYDROCHLORIDE 1 MG/ML
1 INJECTION INTRAMUSCULAR; INTRAVENOUS
Status: DISCONTINUED | OUTPATIENT
Start: 2023-06-07 | End: 2023-06-07 | Stop reason: HOSPADM

## 2023-06-07 RX ORDER — LIDOCAINE HYDROCHLORIDE 10 MG/ML
0.5 INJECTION, SOLUTION EPIDURAL; INFILTRATION; INTRACAUDAL; PERINEURAL ONCE AS NEEDED
Status: DISCONTINUED | OUTPATIENT
Start: 2023-06-07 | End: 2023-06-07 | Stop reason: HOSPADM

## 2023-06-07 RX ORDER — FENTANYL CITRATE 50 UG/ML
50 INJECTION, SOLUTION INTRAMUSCULAR; INTRAVENOUS ONCE AS NEEDED
Status: DISCONTINUED | OUTPATIENT
Start: 2023-06-07 | End: 2023-06-07 | Stop reason: HOSPADM

## 2023-06-07 RX ORDER — SODIUM CHLORIDE 0.9 % (FLUSH) 0.9 %
3-10 SYRINGE (ML) INJECTION AS NEEDED
Status: DISCONTINUED | OUTPATIENT
Start: 2023-06-07 | End: 2023-06-07 | Stop reason: HOSPADM

## 2023-06-07 RX ORDER — NALOXONE HCL 0.4 MG/ML
0.2 VIAL (ML) INJECTION AS NEEDED
Status: DISCONTINUED | OUTPATIENT
Start: 2023-06-07 | End: 2023-06-07 | Stop reason: HOSPADM

## 2023-06-07 RX ORDER — FENTANYL CITRATE 50 UG/ML
50 INJECTION, SOLUTION INTRAMUSCULAR; INTRAVENOUS
Status: DISCONTINUED | OUTPATIENT
Start: 2023-06-07 | End: 2023-06-07 | Stop reason: HOSPADM

## 2023-06-07 RX ORDER — DIPHENHYDRAMINE HYDROCHLORIDE 50 MG/ML
12.5 INJECTION INTRAMUSCULAR; INTRAVENOUS
Status: DISCONTINUED | OUTPATIENT
Start: 2023-06-07 | End: 2023-06-07 | Stop reason: HOSPADM

## 2023-06-07 RX ORDER — KETOROLAC TROMETHAMINE 30 MG/ML
INJECTION, SOLUTION INTRAMUSCULAR; INTRAVENOUS AS NEEDED
Status: DISCONTINUED | OUTPATIENT
Start: 2023-06-07 | End: 2023-06-07 | Stop reason: SURG

## 2023-06-07 RX ORDER — SODIUM CHLORIDE, SODIUM LACTATE, POTASSIUM CHLORIDE, CALCIUM CHLORIDE 600; 310; 30; 20 MG/100ML; MG/100ML; MG/100ML; MG/100ML
9 INJECTION, SOLUTION INTRAVENOUS CONTINUOUS
Status: DISCONTINUED | OUTPATIENT
Start: 2023-06-07 | End: 2023-06-07 | Stop reason: HOSPADM

## 2023-06-07 RX ORDER — HYDROMORPHONE HYDROCHLORIDE 1 MG/ML
0.5 INJECTION, SOLUTION INTRAMUSCULAR; INTRAVENOUS; SUBCUTANEOUS
Status: DISCONTINUED | OUTPATIENT
Start: 2023-06-07 | End: 2023-06-07 | Stop reason: HOSPADM

## 2023-06-07 RX ORDER — FENTANYL CITRATE 50 UG/ML
INJECTION, SOLUTION INTRAMUSCULAR; INTRAVENOUS AS NEEDED
Status: DISCONTINUED | OUTPATIENT
Start: 2023-06-07 | End: 2023-06-07 | Stop reason: SURG

## 2023-06-07 RX ORDER — DROPERIDOL 2.5 MG/ML
0.62 INJECTION, SOLUTION INTRAMUSCULAR; INTRAVENOUS
Status: DISCONTINUED | OUTPATIENT
Start: 2023-06-07 | End: 2023-06-07 | Stop reason: HOSPADM

## 2023-06-07 RX ORDER — PROPOFOL 10 MG/ML
VIAL (ML) INTRAVENOUS AS NEEDED
Status: DISCONTINUED | OUTPATIENT
Start: 2023-06-07 | End: 2023-06-07 | Stop reason: SURG

## 2023-06-07 RX ORDER — PROMETHAZINE HYDROCHLORIDE 25 MG/1
25 SUPPOSITORY RECTAL ONCE AS NEEDED
Status: DISCONTINUED | OUTPATIENT
Start: 2023-06-07 | End: 2023-06-07 | Stop reason: HOSPADM

## 2023-06-07 RX ORDER — PROMETHAZINE HYDROCHLORIDE 25 MG/1
25 TABLET ORAL ONCE AS NEEDED
Status: DISCONTINUED | OUTPATIENT
Start: 2023-06-07 | End: 2023-06-07 | Stop reason: HOSPADM

## 2023-06-07 RX ORDER — LIDOCAINE HYDROCHLORIDE 20 MG/ML
INJECTION, SOLUTION INFILTRATION; PERINEURAL AS NEEDED
Status: DISCONTINUED | OUTPATIENT
Start: 2023-06-07 | End: 2023-06-07 | Stop reason: SURG

## 2023-06-07 RX ORDER — DOXYCYCLINE 100 MG/1
100 CAPSULE ORAL ONCE
Status: COMPLETED | OUTPATIENT
Start: 2023-06-07 | End: 2023-06-07

## 2023-06-07 RX ORDER — ACETAMINOPHEN 500 MG
1000 TABLET ORAL ONCE
Status: COMPLETED | OUTPATIENT
Start: 2023-06-07 | End: 2023-06-07

## 2023-06-07 RX ORDER — METHYLERGONOVINE MALEATE 0.2 MG/1
0.2 TABLET ORAL EVERY 6 HOURS
Qty: 3 TABLET | Refills: 0 | Status: SHIPPED | OUTPATIENT
Start: 2023-06-07 | End: 2023-06-08

## 2023-06-07 RX ORDER — DOXYCYCLINE HYCLATE 100 MG/1
200 CAPSULE ORAL ONCE
Qty: 2 CAPSULE | Refills: 0 | Status: SHIPPED | OUTPATIENT
Start: 2023-06-07 | End: 2023-06-08

## 2023-06-07 RX ORDER — METHYLERGONOVINE MALEATE 0.2 MG/ML
INJECTION INTRAVENOUS AS NEEDED
Status: DISCONTINUED | OUTPATIENT
Start: 2023-06-07 | End: 2023-06-07 | Stop reason: SURG

## 2023-06-07 RX ORDER — LABETALOL HYDROCHLORIDE 5 MG/ML
5 INJECTION, SOLUTION INTRAVENOUS
Status: DISCONTINUED | OUTPATIENT
Start: 2023-06-07 | End: 2023-06-07 | Stop reason: HOSPADM

## 2023-06-07 RX ORDER — SODIUM CHLORIDE 9 MG/ML
40 INJECTION, SOLUTION INTRAVENOUS AS NEEDED
Status: DISCONTINUED | OUTPATIENT
Start: 2023-06-07 | End: 2023-06-07 | Stop reason: HOSPADM

## 2023-06-07 RX ORDER — ONDANSETRON 2 MG/ML
4 INJECTION INTRAMUSCULAR; INTRAVENOUS ONCE AS NEEDED
Status: DISCONTINUED | OUTPATIENT
Start: 2023-06-07 | End: 2023-06-07 | Stop reason: HOSPADM

## 2023-06-07 RX ORDER — DEXAMETHASONE SODIUM PHOSPHATE 4 MG/ML
INJECTION, SOLUTION INTRA-ARTICULAR; INTRALESIONAL; INTRAMUSCULAR; INTRAVENOUS; SOFT TISSUE AS NEEDED
Status: DISCONTINUED | OUTPATIENT
Start: 2023-06-07 | End: 2023-06-07 | Stop reason: SURG

## 2023-06-07 RX ORDER — FLUMAZENIL 0.1 MG/ML
0.2 INJECTION INTRAVENOUS AS NEEDED
Status: DISCONTINUED | OUTPATIENT
Start: 2023-06-07 | End: 2023-06-07 | Stop reason: HOSPADM

## 2023-06-07 RX ORDER — IBUPROFEN 600 MG/1
600 TABLET ORAL EVERY 6 HOURS PRN
Qty: 20 TABLET | Refills: 0 | Status: SHIPPED | OUTPATIENT
Start: 2023-06-07

## 2023-06-07 RX ORDER — HYDRALAZINE HYDROCHLORIDE 20 MG/ML
5 INJECTION INTRAMUSCULAR; INTRAVENOUS
Status: DISCONTINUED | OUTPATIENT
Start: 2023-06-07 | End: 2023-06-07 | Stop reason: HOSPADM

## 2023-06-07 RX ORDER — SUCCINYLCHOLINE/SOD CL,ISO/PF 200MG/10ML
SYRINGE (ML) INTRAVENOUS AS NEEDED
Status: DISCONTINUED | OUTPATIENT
Start: 2023-06-07 | End: 2023-06-07 | Stop reason: SURG

## 2023-06-07 RX ORDER — ONDANSETRON 2 MG/ML
INJECTION INTRAMUSCULAR; INTRAVENOUS AS NEEDED
Status: DISCONTINUED | OUTPATIENT
Start: 2023-06-07 | End: 2023-06-07 | Stop reason: SURG

## 2023-06-07 RX ORDER — GABAPENTIN 300 MG/1
600 CAPSULE ORAL ONCE
Status: COMPLETED | OUTPATIENT
Start: 2023-06-07 | End: 2023-06-07

## 2023-06-07 RX ORDER — SODIUM CHLORIDE 0.9 % (FLUSH) 0.9 %
10 SYRINGE (ML) INJECTION AS NEEDED
Status: DISCONTINUED | OUTPATIENT
Start: 2023-06-07 | End: 2023-06-07 | Stop reason: HOSPADM

## 2023-06-07 RX ADMIN — FENTANYL CITRATE 50 MCG: 50 INJECTION, SOLUTION INTRAMUSCULAR; INTRAVENOUS at 12:58

## 2023-06-07 RX ADMIN — ONDANSETRON 4 MG: 2 INJECTION INTRAMUSCULAR; INTRAVENOUS at 13:05

## 2023-06-07 RX ADMIN — Medication 200 MG: at 12:58

## 2023-06-07 RX ADMIN — GABAPENTIN 600 MG: 300 CAPSULE ORAL at 12:20

## 2023-06-07 RX ADMIN — HYDROMORPHONE HYDROCHLORIDE 0.5 MG: 1 INJECTION, SOLUTION INTRAMUSCULAR; INTRAVENOUS; SUBCUTANEOUS at 14:04

## 2023-06-07 RX ADMIN — PROPOFOL 250 MG: 10 INJECTION, EMULSION INTRAVENOUS at 12:58

## 2023-06-07 RX ADMIN — ACETAMINOPHEN 1000 MG: 500 TABLET ORAL at 12:20

## 2023-06-07 RX ADMIN — FAMOTIDINE 20 MG: 10 INJECTION INTRAVENOUS at 12:21

## 2023-06-07 RX ADMIN — MIDAZOLAM 1 MG: 1 INJECTION INTRAMUSCULAR; INTRAVENOUS at 12:21

## 2023-06-07 RX ADMIN — FENTANYL CITRATE 50 MCG: 50 INJECTION, SOLUTION INTRAMUSCULAR; INTRAVENOUS at 13:46

## 2023-06-07 RX ADMIN — DEXAMETHASONE SODIUM PHOSPHATE 8 MG: 4 INJECTION, SOLUTION INTRAMUSCULAR; INTRAVENOUS at 13:01

## 2023-06-07 RX ADMIN — LIDOCAINE HYDROCHLORIDE 100 MG: 20 INJECTION, SOLUTION INFILTRATION; PERINEURAL at 12:58

## 2023-06-07 RX ADMIN — SODIUM CHLORIDE, POTASSIUM CHLORIDE, SODIUM LACTATE AND CALCIUM CHLORIDE 9 ML/HR: 600; 310; 30; 20 INJECTION, SOLUTION INTRAVENOUS at 12:04

## 2023-06-07 RX ADMIN — SCOPALAMINE 1 PATCH: 1 PATCH, EXTENDED RELEASE TRANSDERMAL at 12:20

## 2023-06-07 RX ADMIN — METHYLERGONOVINE MALEATE 200 MCG: 0.2 INJECTION INTRAVENOUS at 13:17

## 2023-06-07 RX ADMIN — DOXYCYCLINE 100 MG: 100 CAPSULE ORAL at 12:20

## 2023-06-07 RX ADMIN — KETOROLAC TROMETHAMINE 30 MG: 30 INJECTION, SOLUTION INTRAMUSCULAR; INTRAVENOUS at 13:20

## 2023-06-07 NOTE — H&P
[facility]   HISTORY AND PHYSICAL    Patient Name: Davida Quinonez  : 1995  MRN: [id number]  Primary Care Physician:  Laurie Wheatley APRN  Date of admission: 2023    Subjective   Subjective     Chief Complaint: Missed  at 7-6 weeks     History of Present IllnessDavida Quinonez is a 28 y.o. female who presented to the office two days ago for pregnancy confirmation with lmp of 4/3/23.  Ultrasound revealed a missed  at 7-6 weeks.  Patient denied any vag bleeding or abdominal pain .   Discussed recommendation of a D&C and patient desires to proceed. She had one in  for a missed . Discussed risks to include perforation, infection and heavy bleeding. Patient expressed understanding and desires to proceed with Suction Dilation and Curretage. Rhogam given in the office.      Review of Systems   Constitutional:  Negative for chills, fatigue and fever.   Gastrointestinal:  Negative for abdominal pain.   Genitourinary:  Negative for dysuria, pelvic pain, vaginal bleeding, vaginal discharge and vaginal pain.   All other systems reviewed and are negative.     Personal History     Past Medical History:   Diagnosis Date    Abnormal Pap smear of cervix     ADHD (attention deficit hyperactivity disorder)     never medicated    Anxiety     not medicated during pregnancy    Arthritis     Clinical diagnosis of COVID-19 2022    HEADACHE ONLY    Depression     not medicated during pregnancy    HPV in female     Impingement syndrome of left shoulder     Miscarriage     Obesity 2008    Sleep apnea     cpap occasionally       Past Surgical History:   Procedure Laterality Date    ADENOIDECTOMY       SECTION N/A 2021    Procedure:  SECTION PRIMARY;  Surgeon: Yadi Barrera MD;  Location: [facility] DELIVERY;  Service: Obstetrics/Gynecology;  Laterality: N/A;    D & C WITH SUCTION N/A 2020    Procedure: DILATATION AND CURETTAGE WITH SUCTION;   Surgeon: Yadi Barrera MD;  Location: [facility] OR;  Service: Obstetrics/Gynecology;  Laterality: N/A;    EAR TUBES      JOINT MANIPULATION Left 7/26/2022    Procedure: LEFT SHOULDER MANIPULATION;  Surgeon: Navid Hinds MD;  Location: [facility] OR [facility];  Service: Orthopedics;  Laterality: Left;    LAPAROSCOPIC CHOLECYSTECTOMY      SHOULDER ARTHROSCOPY W/ SUPERIOR LABRAL ANTERIOR POSTERIOR REPAIR Left 03/29/2022    Procedure: LEFT SHOULDER ARTHROSCOPY LBARAL REPAIR WITH DISTAL CLAVICLE EXCISION AND SUBACROMIAL DECOMPRESSION;  Surgeon: Navid Hinds MD;  Location: [facility] OR [facility];  Service: Orthopedics;  Laterality: Left;    TONSILLECTOMY      WISDOM TOOTH EXTRACTION         Family History: family history includes Anxiety disorder in her mother; Cancer in her maternal grandmother; Depression in her mother; Diabetes in her maternal grandfather; Hyperlipidemia in her father; Hypertension in her father; No Known Problems in her brother and sister; Ovarian cancer in an other family member; Pancreatic cancer in her maternal grandmother; Pancreatitis in her maternal grandmother; Thyroid disease in her mother. Otherwise pertinent FHx was reviewed and not pertinent to current issue.    Social History:  reports that she has never smoked. She has never used smokeless tobacco. She reports that she does not drink alcohol and does not use drugs.    Home Medications:  metFORMIN ER and prenatal vitamin    Allergies:  No Known Allergies    Objective    Objective     Vitals:   Temp:  [98.1 °F (36.7 °C)] 98.1 °F (36.7 °C)  Heart Rate:  [73] 73  Resp:  [16] 16  BP: (120)/(66) 120/66    Physical Exam  Vitals and nursing note reviewed.   Constitutional:       Appearance: Normal appearance. She is obese.   Pulmonary:      Effort: Pulmonary effort is normal.   Neurological:      Mental Status: She is alert and oriented to person, place, and time.   Psychiatric:         Mood and Affect: Mood normal.         Behavior: Behavior  normal.               Result Review    Result Review:  I have personally reviewed the results from the time of this admission to 2023 11:27 EDT and agree with these findings:  [x]  Laboratory list / accordion  []  Microbiology  [x]  Radiology  []  EKG/Telemetry   []  Cardiology/Vascular   []  Pathology  []  Old records  []  Other:  Most notable findings include: Missed        Assessment & Plan   Assessment / Plan     Brief Patient Summary:  Davida Quinonez is a 28 y.o. female who presents for suction dilation and curretage.       Active Hospital Problems:  Active Hospital Problems    Diagnosis     **Missed       Plan: Proceed to OR       DVT prophylaxis:  Mechanical DVT prophylaxis orders are present.    CODE STATUS:    Code Status (Patient has no pulse and is not breathing): CPR (Attempt to Resuscitate)  Medical Interventions (Patient has pulse or is breathing): Full Support  Release to patient: Routine Release    Admission Status:  I believe this patient meets outpatient status.    Yadi Barrera MD

## 2023-06-07 NOTE — DISCHARGE INSTRUCTIONS
Scopolamine Patch  This patch has been applied to the skin behind one of your ears.  It may stay in place up to 24 hours. You may remove it at any time after your surgery; however, it should be removed after you are up and walking around the next day.  This medicine reduces stomach upset. Side effects may include: dry mouth, dizziness, sleepiness, constipation, or upset stomach.  An allergy would show up as: a rash, itching, wheezing or shortness of breath.  Follow these instructions:  Do not drink alcohol, drive or operate machinery while taking this medicine.  Wear only 1 patch at a time. You can leave the patch on for up to 24 hours.  When you remove the patch, fold it in half with the sticky sides together and throw it away. Wash your hands and the area under the patch.  Do not touch your eye with your hand if it has touched the patch.  Wash your hands well before and after touching the patch.  Sit or stand slowly to avoid dizziness.  Call your doctor if you have:  Any sign of allergy  No relief  Trouble passing urine  Any new or severe symptoms        HOW DO I REST MY PELVIS?  For as long as told by your health care provider:  Do not have sex, sexual stimulation, or an orgasm.  Do not use tampons. Do not douche. Do not put anything in your vagina.  Avoid activities that take a lot of effort (are strenuous).  Avoid any activity in which your pelvic muscles could become strained.

## 2023-06-07 NOTE — ANESTHESIA POSTPROCEDURE EVALUATION
"Patient: Davida Quinonez    Procedure Summary       Date: 23 Room / Location: [facility] OR [address] [place] [address] MAIN OR    Anesthesia Start: 1253 Anesthesia Stop: 1347    Procedure: DILATATION AND CURETTAGE WITH SUCTION (Vagina) Diagnosis:       Missed       (Missed  [O02.1])    Surgeons: Yadi Barrera MD Provider: Abena Hinds MD    Anesthesia Type: general ASA Status: 3            Anesthesia Type: general    Vitals  Vitals Value Taken Time   /44 23 1415   Temp 36.8 °C (98.3 °F) 23 1336   Pulse 97 23 1433   Resp 16 23 1415   SpO2 76 % 23 1433   Vitals shown include unvalidated device data.        Post Anesthesia Care and Evaluation    Patient location during evaluation: bedside  Patient participation: complete - patient participated  Level of consciousness: awake and alert  Pain management: adequate    Airway patency: patent  Anesthetic complications: No anesthetic complications  PONV Status: controlled  Cardiovascular status: acceptable and hemodynamically stable  Respiratory status: acceptable, spontaneous ventilation and nonlabored ventilation  Hydration status: acceptable    Comments: /44   Pulse 62   Temp 36.8 °C (98.3 °F)   Resp 16   Ht 177.8 cm (70\")   LMP 2023 (Approximate)   SpO2 100%   BMI 42.90 kg/m²         "

## 2023-06-07 NOTE — ANESTHESIA PROCEDURE NOTES
Airway  Urgency: elective    Date/Time: 6/7/2023 1:00 PM  Airway not difficult    General Information and Staff    Patient location during procedure: OR  Anesthesiologist: Abena Hinds MD  CRNA/CAA: Deepthi Hinds CRNA    Indications and Patient Condition  Indications for airway management: airway protection    Preoxygenated: yes  MILS not maintained throughout  Mask difficulty assessment: 2 - vent by mask + OA or adjuvant +/- NMBA    Final Airway Details  Final airway type: endotracheal airway      Successful airway: ETT  Cuffed: yes   Successful intubation technique: direct laryngoscopy  Facilitating devices/methods: intubating stylet and anterior pressure/BURP  Endotracheal tube insertion site: oral  Blade: Vero  Blade size: 3  ETT size (mm): 7.0  Cormack-Lehane Classification: grade I - full view of glottis  Placement verified by: chest auscultation and capnometry   Measured from: lips  ETT/EBT  to lips (cm): 22  Number of attempts at approach: 1  Assessment: lips, teeth, and gum same as pre-op and atraumatic intubation

## 2023-06-07 NOTE — OP NOTE
[facility]   Obstetrics and Gynecology   Operative Note    Date of Procedure: 2023    Patient:  Davida Quinonez   MR#: [id number]    PREOPERATIVE DIAGNOSIS:   Missed  [O02.1]    Post-Op Diagnosis Codes:     * Missed  [O02.1]      PROCEDURES PERFORMED:    1. Suction Dilation and Curretage       SURGEON: Yadi Barrera MD    ASSISTANT: None     ANESTHESIA: General.      ESTIMATED BLOOD LOSS: 100 mL.      SPECIMENS:   Order Name Source Comment Collection Info Order Time   PREGNANCY, URINE Urine, Clean Catch   2023 11:28 AM     Release to patient   Routine Release        TISSUE PATHOLOGY EXAM Products of Conception  Collected By: Yadi Barrera MD 2023  1:20 PM     Release to patient   Routine Release            COMPLICATIONS: None.      INDICATIONS: See the written history and physical.      DESCRIPTION OF PROCEDURE: The patient is taken to the operating room and placed in supine position.  General anesthesia is administered and the surgical timeout for  procedure is performed.    The patient is prepped and draped in the usual sterile fashion with her legs positioned in candycane stirrups.  Weighted speculum is inserted into the vagina and the cervix is grasped anteriorly with a single-tooth tenaculum.  Sequential dilators were used to dilate the cervix to a maximum of 18 M[place]ese.  A 7 mm cannula is inserted into the uterine cavity and in a rotating fashion is used to evacuate retained products of conception.  The procedure is repeated approximately 3 times then a single pass is made with the sharp curette followed by final pass with the suction curet.  Findings were consistent with products of conception and scant bleeding is noted after the procedure.    The patient's awakened and taken recovery room in stable condition to patient's blood type is A Negative and RhoGAM was given on 23.           Yadi Barrera MD  2023  13:53 EDT

## 2023-06-07 NOTE — ANESTHESIA PREPROCEDURE EVALUATION
Anesthesia Evaluation     no history of anesthetic complications:                Airway   Mallampati: II  TM distance: >3 FB  Neck ROM: full  Large neck circumference  Dental - normal exam     Pulmonary    (+) ,sleep apnea on CPAP  (-) shortness of breath, recent URI  Cardiovascular     (-) hypertension, valvular problems/murmurs, dysrhythmias, angina, hyperlipidemia      Neuro/Psych  (-) dizziness/light headedness, syncope  GI/Hepatic/Renal/Endo    (+) morbid obesity  (-) no renal disease, diabetes    Musculoskeletal     Abdominal    Substance History      OB/GYN    (+) Pregnant (missed AB)        Other                      Anesthesia Plan    ASA 3     general     intravenous induction     Anesthetic plan, risks, benefits, and alternatives have been provided, discussed and informed consent has been obtained with: patient.    CODE STATUS:    Code Status (Patient has no pulse and is not breathing): CPR (Attempt to Resuscitate)  Medical Interventions (Patient has pulse or is breathing): Full Support  Release to patient: Routine Release

## 2023-06-08 ENCOUNTER — TELEPHONE (OUTPATIENT)
Dept: OBSTETRICS AND GYNECOLOGY | Age: 28
End: 2023-06-08
Payer: COMMERCIAL

## 2023-06-08 LAB
LAB AP CASE REPORT: NORMAL
PATH REPORT.FINAL DX SPEC: NORMAL
PATH REPORT.GROSS SPEC: NORMAL

## 2023-06-08 NOTE — TELEPHONE ENCOUNTER
Pt called back and stated everything is going well after her D&C. Pt states she wants to know how long she has to wait to pick her son up. Pt notified that she is able to pick her son up now per Blanca. Pt has no problems or concerns at this time.

## 2023-06-23 PROBLEM — O02.1 MISSED ABORTION: Status: RESOLVED | Noted: 2023-06-05 | Resolved: 2023-06-23

## 2023-07-16 PROBLEM — K35.80 ACUTE APPENDICITIS: Status: ACTIVE | Noted: 2023-07-16

## 2023-08-01 ENCOUNTER — OFFICE VISIT (OUTPATIENT)
Dept: SURGERY | Facility: CLINIC | Age: 28
End: 2023-08-01
Payer: COMMERCIAL

## 2023-08-01 VITALS
SYSTOLIC BLOOD PRESSURE: 110 MMHG | DIASTOLIC BLOOD PRESSURE: 80 MMHG | WEIGHT: 293 LBS | BODY MASS INDEX: 43.4 KG/M2 | HEIGHT: 69 IN

## 2023-08-01 DIAGNOSIS — Z48.89 POSTOPERATIVE VISIT: Primary | ICD-10-CM

## 2023-08-01 PROCEDURE — 99024 POSTOP FOLLOW-UP VISIT: CPT | Performed by: SURGERY

## 2023-08-28 ENCOUNTER — OFFICE VISIT (OUTPATIENT)
Dept: SLEEP MEDICINE | Facility: HOSPITAL | Age: 28
End: 2023-08-28
Payer: COMMERCIAL

## 2023-08-28 VITALS — HEIGHT: 69 IN | HEART RATE: 75 BPM | BODY MASS INDEX: 43.4 KG/M2 | WEIGHT: 293 LBS | OXYGEN SATURATION: 97 %

## 2023-08-28 DIAGNOSIS — G47.33 OSA ON CPAP: Primary | ICD-10-CM

## 2023-08-28 DIAGNOSIS — Z99.89 OSA ON CPAP: Primary | ICD-10-CM

## 2023-08-28 PROCEDURE — G0463 HOSPITAL OUTPT CLINIC VISIT: HCPCS

## 2023-08-28 NOTE — PROGRESS NOTES
"      [facility]         Dr Joanna Marsh  [unfilled]  Patient Care Team:  Laurie Wheatley APRN as PCP - General (Family Medicine)  Barrera, Yadi Hurtado MD as Obstetrician (Obstetrics and Gynecology)    Chief Complaint::Mild SANDOVAL apnea index 5 events per hour  Sleep-related hypoxemia with some artifacts noted on oxygen saturation monitor possibly not accurate.  Minimal snoring noted    Interval History: Patient here for compliance visit.  Unfortunately compliance is only 3%.  Patient tells me that she has dry nose and dry mouth.  Currently uses a nasal mask.  Goes to bed 10 gets up 615 gets about 8 hours of sleep and feels the same with or without the CPAP.  Still snoring without the CPAP.  No tobacco no alcohol no caffeine abuse.  She has had appendectomy done since last visit.  Currently has a nasal mask with no chinstrap.  [place] 1 out of 24 within normal limits    REVIEW OF SYSTEMS:   CARDIOVASCULAR: No chest pain, chest pressure or chest discomfort. No palpitations or edema.   RESPIRATORY: No shortness of breath, cough or sputum.   GASTROINTESTINAL: No anorexia, nausea, vomiting or diarrhea. No abdominal pain or blood.   HEMATOLOGIC: No bleeding or bruising.     Ventilator/Non-Invasive Ventilation Settings (From admission, onward)      None              Vital Signs  Heart Rate:  [75] 75  [unfilled]  Flowsheet Rows      Flowsheet Row First Filed Value   Admission Height 175.3 cm (69\") Documented at 08/28/2023 1500   Admission Weight 138 kg (304 lb 3.2 oz) Documented at 08/28/2023 1500            Physical Exam:  Patient is examined using the personal protective equipment as per guidelines from infection control for this particular patient as enacted.  Hand hygiene was performed before and after patient interaction.   General Appearance:    Alert, cooperative, in no acute distress.  Following simple commands  ENT Mallampati between 3 and 4 no nasal congestion  Neck midline trachea, no " thyromegaly   Lungs:     Clear to auscultation, respirations regular, even and                  unlabored    Heart:    Regular rhythm and normal rate, normal S1 and S2, no            murmur, no gallop, no rub, no click   Chest Wall:    No abnormalities observed   Abdomen:     Normal bowel sounds, no masses, no organomegaly, soft        nontender, nondistended, no guarding, no rebound                tenderness   Extremities:   Moves all extremities well, no edema, no cyanosis, no             redness  CNS no focal neurological deficits normal sensory exam  Skin no rashes no nodules  Musculoskeletal no cyanosis no clubbing normal range of motion     Results Review:                                          I reviewed the patient's new clinical results.  I personally viewed and interpreted the patient's chest x-ray.        Medication Review:       No current facility-administered medications for this visit.      ASSESSMENT:   SANDOVAL on CPAP  Obesity  Anxiety depression    PLAN:  I reviewed download with the patient  She needs to improve her compliance  Have asked her to adjust her humidification higher  I have asked her to add a chinstrap to her current CPAP  Sleep hygiene measures  Weight loss encouraged  Treatment of underlying comorbidities  We will see her back in 8 to 10 weeks for compliance download      Joanna Marsh MD  08/28/23  15:34 EDT

## 2023-09-12 ENCOUNTER — TELEPHONE (OUTPATIENT)
Dept: OBSTETRICS AND GYNECOLOGY | Age: 28
End: 2023-09-12
Payer: COMMERCIAL

## 2023-09-12 DIAGNOSIS — N92.6 MISSED MENSES: Primary | ICD-10-CM

## 2023-09-12 NOTE — TELEPHONE ENCOUNTER
Provider: DR TAY     Caller: AMANDA STILES     Relationship to Patient: SELF     Pharmacy: [facility]     Phone Number: [phone]    Reason for Call: PT TOOK A PREGNANCY TEST LAST NIGHT AND IT WAS POSITIVE THIS MORNING SHE TOOK ANOTHER AND IT THE POSITIVE LINE WAS FAINT - PT DOES HAVE A HX OF MISCARRIAGES- LAST ONE WAS IN JUNE - SHE STATES IF PREGNANT SHE WOULD NEED TO BE PUT ON PROGESTERONE AND BABY ASPRIN  - PT WOULD LIKE TO BE ADVISED AND POSSIBLY COME IN FOR LABS PLEASE CALL

## 2023-09-13 LAB — HCG INTACT+B SERPL-ACNC: 5.99 MIU/ML

## 2023-10-11 ENCOUNTER — TELEPHONE (OUTPATIENT)
Dept: OBSTETRICS AND GYNECOLOGY | Age: 28
End: 2023-10-11
Payer: COMMERCIAL

## 2023-10-11 NOTE — TELEPHONE ENCOUNTER
Blanca, please send therapy list to patient.  Please tell her to call back if she has any questions.  The send the [facility] brochure as well.   Nostril Rim Text: The closure involved the nostril rim.

## 2023-10-11 NOTE — TELEPHONE ENCOUNTER
----- Message from Davida Quinonez sent at 10/11/2023  3:53 PM EDT -----  Regarding: Therapy referral   Contact: [phone]  Hello I need to get a referral for therapy. I know we talked about this before. Thank you.

## 2023-10-13 NOTE — TELEPHONE ENCOUNTER
Dr mckeon I spoke with patient this morning she said we already did provided both of the lists to her in the past and they did not except her insurance .  Any idea who else she can contact  , I advised her to maybe call her insurance and get a list of counseling and psychiatry  .

## 2023-10-16 NOTE — TELEPHONE ENCOUNTER
Tiara  -can you please help us with this? Counselors/therapist that accept her insurance? Thank you!    Blanca - please let patient know we are working on.

## 2023-10-20 NOTE — TELEPHONE ENCOUNTER
I spoke to pt and let her know that I got on the [facility] website and printed her a list of Counselors that are approved. I emailed the list to her. I advised her to call with any questions.

## 2023-11-22 ENCOUNTER — LAB (OUTPATIENT)
Dept: OBSTETRICS AND GYNECOLOGY | Age: 28
End: 2023-11-22
Payer: COMMERCIAL

## 2023-11-22 ENCOUNTER — TELEPHONE (OUTPATIENT)
Dept: OBSTETRICS AND GYNECOLOGY | Age: 28
End: 2023-11-22

## 2023-11-22 DIAGNOSIS — N92.6 MISSED MENSES: ICD-10-CM

## 2023-11-22 DIAGNOSIS — N92.6 MISSED MENSES: Primary | ICD-10-CM

## 2023-11-22 LAB — HCG INTACT+B SERPL-ACNC: 29.2 MIU/ML

## 2023-11-22 RX ORDER — PROGESTERONE 200 MG/1
200 CAPSULE ORAL DAILY
Qty: 30 CAPSULE | Refills: 2 | Status: SHIPPED | OUTPATIENT
Start: 2023-11-22

## 2023-11-22 RX ORDER — ASPIRIN 81 MG/1
81 TABLET ORAL DAILY
Qty: 90 TABLET | Refills: 2 | Status: SHIPPED | OUTPATIENT
Start: 2023-11-22

## 2023-11-22 NOTE — TELEPHONE ENCOUNTER
Lab order placed.  Please schedule confirmation visit on December 7 with Yadi Long.  Progesterone and aspirin sent to pharmacy.

## 2023-11-22 NOTE — TELEPHONE ENCOUNTER
Caller: Davida Quinonez    Relationship: Self    Best call back number: [phone]    What orders are you requesting (i.e. lab or imaging): HCG LAB    In what timeframe would the patient need to come in: ASAP    Where will you receive your lab/imaging services: EITHER LOCATION WILL WORK FOR PT     Additional notes: PT WOULD LIKE TO HAVE A LAB DRAWN TO CHECK PREGNANCY SO IF SHE'S PREGNANT SHE CAN START PROGESTERONE PLEASE CALL PT TO ADVISE            Retention Suture Text: Retention sutures were placed to support the closure and prevent dehiscence.

## 2023-11-27 ENCOUNTER — LAB (OUTPATIENT)
Dept: LAB | Facility: HOSPITAL | Age: 28
End: 2023-11-27
Payer: COMMERCIAL

## 2023-11-27 ENCOUNTER — LAB (OUTPATIENT)
Dept: OBSTETRICS AND GYNECOLOGY | Age: 28
End: 2023-11-27
Payer: COMMERCIAL

## 2023-11-27 ENCOUNTER — OFFICE VISIT (OUTPATIENT)
Dept: SLEEP MEDICINE | Facility: HOSPITAL | Age: 28
End: 2023-11-27
Payer: COMMERCIAL

## 2023-11-27 VITALS — HEART RATE: 82 BPM | WEIGHT: 293 LBS | OXYGEN SATURATION: 100 % | HEIGHT: 69 IN | BODY MASS INDEX: 43.4 KG/M2

## 2023-11-27 DIAGNOSIS — G47.33 OSA ON CPAP: Primary | ICD-10-CM

## 2023-11-27 DIAGNOSIS — N92.6 MISSED MENSES: ICD-10-CM

## 2023-11-27 DIAGNOSIS — N92.6 MISSED MENSES: Primary | ICD-10-CM

## 2023-11-27 PROCEDURE — 84702 CHORIONIC GONADOTROPIN TEST: CPT | Performed by: OBSTETRICS & GYNECOLOGY

## 2023-11-27 PROCEDURE — G0463 HOSPITAL OUTPT CLINIC VISIT: HCPCS

## 2023-11-27 NOTE — PROGRESS NOTES
"      [facility]         Dr Joanna Marsh  [unfilled]  Patient Care Team:  Laurie Wheatley APRN as PCP - General (Family Medicine)  Barrera, Yadi Hurtado MD as Obstetrician (Obstetrics and Gynecology)    Chief Complaint:Mild SANDOVAL apnea index 5 events per hour  Sleep-related hypoxemia with some artifacts noted on oxygen saturation monitor possibly not accurate.  Minimal snoring noted    Interval History: Patient here for compliance visit.  Unfortunately compliance remains poor at 18% currently set up auto CPAP 8 to 14 cm.  Average daily use 5 hours 10 minutes on the days used.  AHI and leak within normal limits.  She tells me she does not feel much different whether she uses a CPAP or not.  She is now 4 weeks pregnant also.  Goes to bed 8 gets up 6 gets about 9+ hours of sleep and tired somewhat in the morning.  No tobacco no alcohol no caffeine abuse.  Currently has a nasal mask that fits well.  Supplies have been adequate.  [place] 2 out of 24 within normal limits    REVIEW OF SYSTEMS:   CARDIOVASCULAR: No chest pain, chest pressure or chest discomfort. No palpitations or edema.   RESPIRATORY: No shortness of breath, cough or sputum.   GASTROINTESTINAL: No anorexia, nausea, vomiting or diarrhea. No abdominal pain or blood.   HEMATOLOGIC: No bleeding or bruising.     Ventilator/Non-Invasive Ventilation Settings (From admission, onward)      None              Vital Signs  Heart Rate:  [82] 82  [unfilled]  Flowsheet Rows      Flowsheet Row First Filed Value   Admission Height 175.3 cm (69\") Documented at 11/27/2023 1525   Admission Weight 135 kg (297 lb 12.8 oz) Documented at 11/27/2023 1525            Physical Exam:  Patient is examined using the personal protective equipment as per guidelines from infection control for this particular patient as enacted.  Hand hygiene was performed before and after patient interaction.   General Appearance:    Alert, cooperative, in no acute distress.  Following " simple commands  ENT Mallampati between 3 and 4 no nasal congestion  Neck midline trachea, no thyromegaly   Lungs:     Clear to auscultation, respirations regular, even and                  unlabored    Heart:    Regular rhythm and normal rate, normal S1 and S2, no            murmur, no gallop, no rub, no click   Chest Wall:    No abnormalities observed   Abdomen:     Normal bowel sounds, no masses, no organomegaly, soft        nontender, nondistended, no guarding, no rebound                tenderness   Extremities:   Moves all extremities well, no edema, no cyanosis, no             redness  CNS no focal neurological deficits normal sensory exam  Skin no rashes no nodules  Musculoskeletal no cyanosis no clubbing normal range of motion     Results Review:                                          I reviewed the patient's new clinical results.  I personally viewed and interpreted the patient's chest x-ray.        Medication Review:       No current facility-administered medications for this visit.      ASSESSMENT:   SANDOVAL on CPAP  Obesity  Pregnancy    PLAN:  Reviewed compliance download with the patient  She is now pregnant and I discussed with patient the importance of using the CPAP with her pregnancy as worsening sleep-related hypoxemia and apnea can affect the fetus's health.  Patient fully understands and will attempt to be more compliant with the CPAP  Sleep hygiene measures to be continued  Treatment of underlying comorbidities  Keeping weight stable as much as possible with her pregnancy  Follow-up in 6 months      Joanna Marsh MD  11/27/23  15:32 EST

## 2023-11-28 DIAGNOSIS — N92.6 MISSED MENSES: Primary | ICD-10-CM

## 2023-11-28 LAB — HCG INTACT+B SERPL-ACNC: 578 MIU/ML

## 2023-11-30 LAB — HCG INTACT+B SERPL-ACNC: NORMAL MIU/ML

## 2023-12-01 ENCOUNTER — CLINICAL SUPPORT (OUTPATIENT)
Dept: OBSTETRICS AND GYNECOLOGY | Age: 28
End: 2023-12-01
Payer: COMMERCIAL

## 2023-12-01 ENCOUNTER — TELEPHONE (OUTPATIENT)
Dept: OBSTETRICS AND GYNECOLOGY | Age: 28
End: 2023-12-01
Payer: COMMERCIAL

## 2023-12-01 DIAGNOSIS — Z13.89 SCREENING FOR BLOOD OR PROTEIN IN URINE: Primary | ICD-10-CM

## 2023-12-01 LAB
BILIRUB BLD-MCNC: NEGATIVE MG/DL
GLUCOSE UR STRIP-MCNC: NEGATIVE MG/DL
KETONES UR QL: NEGATIVE
LEUKOCYTE EST, POC: ABNORMAL
NITRITE UR-MCNC: NEGATIVE MG/ML
PH UR: 5.5 [PH] (ref 5–8)
PROT UR STRIP-MCNC: NEGATIVE MG/DL
RBC # UR STRIP: NEGATIVE /UL
SP GR UR: 1.03 (ref 1–1.03)
UROBILINOGEN UR QL: ABNORMAL

## 2023-12-01 RX ORDER — CEPHALEXIN 500 MG/1
500 CAPSULE ORAL 3 TIMES DAILY
Qty: 21 CAPSULE | Refills: 0 | Status: SHIPPED | OUTPATIENT
Start: 2023-12-01 | End: 2023-12-08

## 2023-12-01 NOTE — TELEPHONE ENCOUNTER
Pt is scheduled for New OB appt on 12/14/23. Pt calling stating she thinks she has a UTI. Pt c/o pain & burning during urination. Pt added to nurse scheduled for UA today.

## 2023-12-01 NOTE — TELEPHONE ENCOUNTER
Pt came in and left urine sample, is having slight discomfort but is better since yesterday, culture sent

## 2023-12-03 LAB
BACTERIA UR CULT: NORMAL
BACTERIA UR CULT: NORMAL

## 2023-12-07 ENCOUNTER — TELEPHONE (OUTPATIENT)
Dept: OBSTETRICS AND GYNECOLOGY | Age: 28
End: 2023-12-07
Payer: COMMERCIAL

## 2023-12-07 NOTE — TELEPHONE ENCOUNTER
Pt is approx 6 week Pregnant and has nx of prior miscarriages. pt stating she had very light pink spotting, Pt states after using the restroom again there was no spotting. Please advise what patient is to do

## 2023-12-07 NOTE — TELEPHONE ENCOUNTER
Pt notified and agrees with understanding, Pt agrees to monitor for now and call office back with any additional spotting or severe pain

## 2023-12-12 ENCOUNTER — TELEPHONE (OUTPATIENT)
Dept: OBSTETRICS AND GYNECOLOGY | Age: 28
End: 2023-12-12
Payer: COMMERCIAL

## 2023-12-12 NOTE — TELEPHONE ENCOUNTER
Patient called and states she is having dizzy spells that are lasting about an hour.  Patient has not yet been seen for pregnancy confirmation but states she is 7 weeks pregnant.  She is also having spotting this morning.  Please advise.

## 2023-12-14 ENCOUNTER — OFFICE VISIT (OUTPATIENT)
Dept: OBSTETRICS AND GYNECOLOGY | Age: 28
End: 2023-12-14
Payer: COMMERCIAL

## 2023-12-14 VITALS
WEIGHT: 293 LBS | DIASTOLIC BLOOD PRESSURE: 82 MMHG | BODY MASS INDEX: 43.4 KG/M2 | HEIGHT: 69 IN | SYSTOLIC BLOOD PRESSURE: 130 MMHG

## 2023-12-14 DIAGNOSIS — Z13.89 SCREENING FOR BLOOD OR PROTEIN IN URINE: ICD-10-CM

## 2023-12-14 DIAGNOSIS — Z11.3 SCREENING FOR STD (SEXUALLY TRANSMITTED DISEASE): ICD-10-CM

## 2023-12-14 DIAGNOSIS — Q51.3 BICORNUATE UTERUS: ICD-10-CM

## 2023-12-14 DIAGNOSIS — Z67.91 RH NEGATIVE STATUS DURING PREGNANCY IN FIRST TRIMESTER: ICD-10-CM

## 2023-12-14 DIAGNOSIS — O26.891 RH NEGATIVE STATUS DURING PREGNANCY IN FIRST TRIMESTER: ICD-10-CM

## 2023-12-14 DIAGNOSIS — Z3A.01 6 WEEKS GESTATION OF PREGNANCY: Primary | ICD-10-CM

## 2023-12-14 NOTE — PROGRESS NOTES
[facility]   Obstetrics and Gynecology   New Gynecology Visit    2023    Patient: Davida Quinonez          MR#:[id number]    History of Present Illness    Chief Complaint   Patient presents with    Possible Pregnancy     Pregnancy confirmation, LMP 10/19/23, no complaints       Patient plans to see Dr. Barrera    28 y.o. female  who presents for confirmation and viability  Last pap 2023 nil  Had some bleeding on Tuesday it was spotting and has since stopped   Taking prenatal and progesterone  Declines nausea meds  Fareed  is with her   Wants genetic testing desires  to know gender   Wants rcs   Wants  a tubal ligation    Had miscarriage with d/c in      Studies reviewed:      Obstetric History:  OB History          4    Para   1    Term   1       0    AB   1    Living   1         SAB   1    IAB   0    Ectopic   0    Molar   0    Multiple   0    Live Births   1          Obstetric Comments   23 - LMP rejected - IUP at 6- 4 weeks - BRANDO 24 - [facility]               Menstrual History:     Patient's last menstrual period was 10/19/2023 (exact date).       Sexual History:         Social History:    ________________________________________  Patient Active Problem List   Diagnosis    Bicornuate uterus    PCOS (polycystic ovarian syndrome)    Rh negative status during pregnancy    S/P D&C (status post dilation and curettage)    Acute appendicitis     Past Medical History:   Diagnosis Date    Abnormal Pap smear of cervix     ADHD (attention deficit hyperactivity disorder)     never medicated    Anxiety     not medicated during pregnancy    Arthritis     Clinical diagnosis of COVID-19 2022    [facility]ACHE ONLY    Depression     not medicated during pregnancy    HPV in female     Impingement syndrome of left shoulder     Miscarriage     Obesity 2008    Sleep apnea     cpap occasionally     Past Surgical History:   Procedure Laterality Date    ADENOIDECTOMY       APPENDECTOMY N/A 2023    Procedure: APPENDECTOMY LAPAROSCOPIC;  Surgeon: Brandt Lemus Jr., MD;  Location: [facility] MAIN OR;  Service: General;  Laterality: N/A;     SECTION N/A 2021    Procedure:  SECTION PRIMARY;  Surgeon: Yadi Barrera MD;  Location: [facility] LABOR DELIVERY;  Service: Obstetrics/Gynecology;  Laterality: N/A;    D & C WITH SUCTION N/A 2020    Procedure: DILATATION AND CURETTAGE WITH SUCTION;  Surgeon: Yadi Barrera MD;  Location: [facility] MAIN OR;  Service: Obstetrics/Gynecology;  Laterality: N/A;    D & C WITH SUCTION N/A 2023    Procedure: DILATATION AND CURETTAGE WITH SUCTION;  Surgeon: Yadi Barrera MD;  Location: [facility] MAIN OR;  Service: Obstetrics/Gynecology;  Laterality: N/A;    EAR TUBES      JOINT MANIPULATION Left 2022    Procedure: LEFT SHOULDER MANIPULATION;  Surgeon: Navid Hinds MD;  Location: [facility] OR [facility];  Service: Orthopedics;  Laterality: Left;    LAPAROSCOPIC CHOLECYSTECTOMY      SHOULDER ARTHROSCOPY W/ SUPERIOR LABRAL ANTERIOR POSTERIOR REPAIR Left 2022    Procedure: LEFT SHOULDER ARTHROSCOPY LBARAL REPAIR WITH DISTAL CLAVICLE EXCISION AND SUBACROMIAL DECOMPRESSION;  Surgeon: Navid Hinds MD;  Location: [facility] OR [facility];  Service: Orthopedics;  Laterality: Left;    TONSILLECTOMY      WISDOM TOOTH EXTRACTION       Social History     Tobacco Use   Smoking Status Never   Smokeless Tobacco Never     Family History   Problem Relation Age of Onset    Thyroid disease Mother     Depression Mother     Anxiety disorder Mother     Miscarriages / Stillbirths Mother     Vision loss Mother     Hypertension Father     Hyperlipidemia Father     Cancer Maternal Grandmother     Pancreatic cancer Maternal Grandmother     Pancreatitis Maternal Grandmother     No Known Problems Brother     No Known Problems Sister     Diabetes Maternal Grandfather     Ovarian cancer Other     Malig Hyperthermia Neg Hx     Breast cancer  "Neg Hx     Uterine cancer Neg Hx     Colon cancer Neg Hx      Prior to Admission medications    Medication Sig Start Date End Date Taking? Authorizing Provider   aspirin 81 MG EC tablet Take 1 tablet by mouth Daily. 11/22/23   Yadi Barrera MD   metFORMIN ER (GLUCOPHAGE-XR) 500 MG 24 hr tablet Take 2 tablets by mouth Daily With Breakfast. 6/5/23   Yadi Barrera MD   prenatal vitamin (prenatal, CLASSIC, vitamin) tablet Take 1 tablet by mouth Daily.    Provider, MD Sheri   Progesterone (PROMETRIUM) 200 MG capsule Take 1 capsule by mouth Daily. 11/22/23   Yadi Barrera MD     ________________________________________    The following portions of the patient's history were reviewed and updated as appropriate: allergies, current medications, past family history, past medical history, past social history, past surgical history, and problem list.    Review of Systems         Objective     /82   Ht 175.3 cm (69\")   Wt 136 kg (299 lb 9.6 oz)   LMP 10/19/2023 (Exact Date)   BMI 44.24 kg/m²    BP Readings from Last 3 Encounters:   12/14/23 130/82   08/01/23 110/80   07/20/23 152/88      Wt Readings from Last 3 Encounters:   12/14/23 136 kg (299 lb 9.6 oz)   11/27/23 135 kg (297 lb 12.8 oz)   08/28/23 (!) 138 kg (304 lb 3.2 oz)        BMI: Estimated body mass index is 44.24 kg/m² as calculated from the following:    Height as of this encounter: 175.3 cm (69\").    Weight as of this encounter: 136 kg (299 lb 9.6 oz).    Physical Exam  Vitals and nursing note reviewed.   Constitutional:       Appearance: Normal appearance.   Cardiovascular:      Rate and Rhythm: Normal rate.   Pulmonary:      Effort: Pulmonary effort is normal.   Musculoskeletal:      Cervical back: Full passive range of motion without pain.   Skin:     General: Skin is warm and dry.   Neurological:      General: No focal deficit present.      Mental Status: She is alert and oriented to person, place, and time.   Psychiatric:    "      Attention and Perception: Attention normal.         Mood and Affect: Mood normal.         Speech: Speech normal.         Behavior: Behavior normal.         Thought Content: Thought content normal.         Judgment: Judgment normal.               Assessment:  Diagnoses and all orders for this visit:    1. 6 weeks gestation of pregnancy (Primary)  -     ABO / Rh  -     RPR  -     Hepatitis B Surface Antigen  -     Rubella Antibody, IgG  -     Hepatitis C Antibody  -     Antibody Screen  -     Varicella Zoster Antibody, IgG  -     CBC (No Diff)  -     Hemoglobin A1c  -     HIV-1 / O / 2 Ag / Antibody  -     Hemoglobinopathy Fractionation Liberty  -     TSH Rfx On Abnormal To Free T4    2. Screening for STD (sexually transmitted disease)  -     Chlamydia trachomatis, Neisseria gonorrhoeae, Trichomonas vaginalis, PCR - Urine, Urine, Clean Catch    3. Screening for blood or protein in urine  -     Urine Culture - Urine, Urine, Clean Catch    4. Rh negative status during pregnancy in first trimester    5. Bicornuate uterus      Impression:  Intrauterine pregnancy at 6w6d  Viable first trimester gestation,   Basis for EDC: LMP  Bicornuate uterus with GS in Left horn  2.12 x 0.39 cm [facility]  BRANDO 8/2/2024    Plan:Early pregnancy counseling provided and New OB folder given  Patient is taking Prenatal vitamins  Problem list reviewed and updated  Reviewed routine prenatal care with the office to include but not limited to expected weight gain during pregnancy, Tylenol products are fine, avoid aspirin and ibuprofen; Zika (travel restrictions/ok to use insect repellant); not to change cat litter; food restrictions; avoidance of alcohol, tobacco, drugs and saunas/hot tubs. Discussed that the COVID and Flu vaccine is safe and recommended in pregnancy.   SAB warnings reviewed  All questions answered  Return in about 4 weeks (around 1/11/2024).      Yadi Long, APRN  12/14/2023 13:43 EST

## 2023-12-15 ENCOUNTER — TELEPHONE (OUTPATIENT)
Facility: HOSPITAL | Age: 28
End: 2023-12-15
Payer: COMMERCIAL

## 2023-12-15 LAB
ABO GROUP BLD: NORMAL
BLD GP AB SCN SERPL QL: NEGATIVE
ERYTHROCYTE [DISTWIDTH] IN BLOOD BY AUTOMATED COUNT: 12.5 % (ref 11.7–15.4)
HBA1C MFR BLD: 5.8 % (ref 4.8–5.6)
HBV SURFACE AG SERPL QL IA: NEGATIVE
HCT VFR BLD AUTO: 41.5 % (ref 34–46.6)
HCV IGG SERPL QL IA: NON REACTIVE
HGB A MFR BLD ELPH: 97.3 % (ref 96.4–98.8)
HGB A2 MFR BLD ELPH: 2.7 % (ref 1.8–3.2)
HGB BLD-MCNC: 13.4 G/DL (ref 11.1–15.9)
HGB F MFR BLD ELPH: 0 % (ref 0–2)
HGB FRACT BLD-IMP: NORMAL
HGB S MFR BLD ELPH: 0 %
HIV 1+2 AB+HIV1 P24 AG SERPL QL IA: NON REACTIVE
MCH RBC QN AUTO: 28.3 PG (ref 26.6–33)
MCHC RBC AUTO-ENTMCNC: 32.3 G/DL (ref 31.5–35.7)
MCV RBC AUTO: 88 FL (ref 79–97)
PLATELET # BLD AUTO: 316 X10E3/UL (ref 150–450)
RBC # BLD AUTO: 4.73 X10E6/UL (ref 3.77–5.28)
RH BLD: NEGATIVE
RPR SER QL: NON REACTIVE
RUBV IGG SERPL IA-ACNC: 3.31 INDEX
TSH SERPL DL<=0.005 MIU/L-ACNC: 2.29 UIU/ML (ref 0.45–4.5)
VZV IGG SER IA-ACNC: 2299 INDEX
WBC # BLD AUTO: 10.4 X10E3/UL (ref 3.4–10.8)

## 2023-12-15 NOTE — TELEPHONE ENCOUNTER
Spoke with patient again after giving her instructions she reports  no clots , no pain , spotting subsided seeing only light brown discharge when wiping . Advised patient to go ER if heavy bleeding with pain . Patient verbally understanding .

## 2023-12-15 NOTE — TELEPHONE ENCOUNTER
Patient started spotting again last night and today she says its more than  spotting more like a light period , not really having much pain . No fever , no recent  IC . She is on progesterone hx of SAB . Please advise ?she had a OB Us yesterday 12/14/23  .

## 2023-12-18 LAB
C TRACH RRNA SPEC QL NAA+PROBE: NEGATIVE
N GONORRHOEA RRNA SPEC QL NAA+PROBE: NEGATIVE
T VAGINALIS RRNA SPEC QL NAA+PROBE: NEGATIVE

## 2023-12-20 ENCOUNTER — TELEPHONE (OUTPATIENT)
Dept: OBSTETRICS AND GYNECOLOGY | Age: 28
End: 2023-12-20
Payer: COMMERCIAL

## 2023-12-20 NOTE — TELEPHONE ENCOUNTER
CC:   Chief Complaint   Patient presents with    Hospital Follow Up     Patient's parents stated she went to [facility] 2023 she was diagnosed with bilateral ear infu     Cyst     Patient's parent's stated they noticed a lump on the right side of the neck near the ear it started off soft but is now hard , they also mention that she does not respond to much when you touch it. HPI: Lilliam Greene (: 2022) is a 16 m.o. female, established patient, here for evaluation of the following chief complaint(s): ear infection neck swelling     ASSESSMENT/PLAN:    ICD-10-CM ICD-9-CM    1. Suppurative otitis media, unspecified chronicity, unspecified laterality  H66.40 382.4       2. Ceruminosis, right  H61.21 380.4       3. Neck swelling  R22.1 784.2 US THYROID/PARATHYROID/SOFT TISS      REFERRAL TO PEDIATRIC ENT      4. Lymphadenitis  I88.9 289.3 amoxicillin-clavulanate (AUGMENTIN) 600-42.9 mg/5 mL suspension      REFERRAL TO PEDIATRIC ENT         1/2/3/4 reviewed prior [facility] evaluation and tx recommendations  Dx with OM and tx with OM, had swelling around the right of her neck not painful which continues  No other symptoms  Trial of augmentin and US ordered today  Went over proper medication use and side effects  Supportive measures including plenty of fluids and solids as tolerate  Referral to ENT for evaluation  Went over signs and symptoms that would warrant evaluation in the clinic once again or urgent/emergent evaluation in the ED. Mom and dad voiced understanding and agreed with plan.            SUBJECTIVE/OBJECTIVE:  [facility]  [facility]  Dx with OM  Had neck swelling at the time  Thought to be related to OM  Completed amox but swelling remains  No fever  No v/d  No shortness of breath trouble swallowing wheezing stridor  Happy energetic  No weight changes  No rashes  No recent travel  No exposure to TB or cats  No ear Light spotting when wiping , patient advised if spotting persists or bleeding gets heavier let us know . Patient verbally understanding .   drainage    ROS:   No fever, cough, nasal congestion/drainage, rhinorrhea, oral lesions, ear pain/drainage, conjunctival injection or icterus, throat pain, neck pain, wheezing, shortness of breath, vomiting, abdominal pain or distention  bowel or bladder problems,   changes in appetite or activity levels,   joint swelling, rashes, petechiae, bruising or other lesions. Rest of 12 point ROS is otherwise negative      Past Medical History:   Diagnosis Date     screening tests negative      No past surgical history on file. Social History     Socioeconomic History    Marital status: SINGLE   Tobacco Use    Smoking status: Never    Smokeless tobacco: Never   Substance and Sexual Activity    Alcohol use: Never    Drug use: Never    Sexual activity: Never     No family history on file. OBJECTIVE:   Visit Vitals  Pulse 122   Resp 20   Ht (!) 2' 7.69\" (0.805 m)   Wt 20 lb 14.4 oz (9.48 kg)   BMI 14.63 kg/m²     Vitals reviewed  GENERAL: WDWN female in NAD. Appears well hydrated, cap refill < 3sec  EYES: PERRLA, EOMI, no conjunctival injection or icterus. No periorbital edema/erythema   EARS: Normal external ear canals with normal  left TM right TM impacted with cerumen able to get some out but still not able to fully visualize TM   NOSE: nasal passages clear. MOUTH: OP clear,  No pharyngeal erythema or exudates  NECK: supple,right sided non tender swelling, non mobile, no surrounding edema or erythema no other lymph nodes appreciated   RESP: clear to auscultation bilaterally, no w/r/r  CV: RRR, normal U7/E7, no murmurs, clicks, or rubs. ABD: soft, nontender, no masses, no hepatosplenomegaly  MS:  FROM all joints  SKIN: no rashes or lesions  NEURO: non-focal          An electronic signature was used to authenticate this note.   -- Angelica Soto DO

## 2023-12-20 NOTE — TELEPHONE ENCOUNTER
Regarding: Spotting  Contact: [phone]  ----- Message from Sarah Herrera sent at 12/20/2023  8:13 AM EST -----       ----- Message from Davida Quinonez Jennifer Heer, MD sent at 12/20/2023  7:02 AM -----   Hello, I started spotting again today. I’m not sure if I need to tell you all every-time I start spotting or what I need to do.

## 2023-12-26 ENCOUNTER — OFFICE VISIT (OUTPATIENT)
Dept: OBSTETRICS AND GYNECOLOGY | Age: 28
End: 2023-12-26
Payer: COMMERCIAL

## 2023-12-26 VITALS
SYSTOLIC BLOOD PRESSURE: 128 MMHG | HEIGHT: 69 IN | WEIGHT: 293 LBS | BODY MASS INDEX: 43.4 KG/M2 | DIASTOLIC BLOOD PRESSURE: 80 MMHG

## 2023-12-26 DIAGNOSIS — Z3A.08 8 WEEKS GESTATION OF PREGNANCY: Primary | ICD-10-CM

## 2023-12-26 DIAGNOSIS — O26.891 RH NEGATIVE STATUS DURING PREGNANCY IN FIRST TRIMESTER: ICD-10-CM

## 2023-12-26 DIAGNOSIS — O26.859 SPOTTING IN PREGNANCY: ICD-10-CM

## 2023-12-26 DIAGNOSIS — Q51.3 BICORNUATE UTERUS: ICD-10-CM

## 2023-12-26 DIAGNOSIS — Z67.91 RH NEGATIVE STATUS DURING PREGNANCY IN FIRST TRIMESTER: ICD-10-CM

## 2023-12-26 DIAGNOSIS — Z13.89 SCREENING FOR BLOOD OR PROTEIN IN URINE: ICD-10-CM

## 2023-12-26 PROBLEM — Z98.890 S/P D&C (STATUS POST DILATION AND CURETTAGE): Status: RESOLVED | Noted: 2023-06-07 | Resolved: 2023-12-26

## 2023-12-26 PROBLEM — K35.80 ACUTE APPENDICITIS: Status: RESOLVED | Noted: 2023-07-16 | Resolved: 2023-12-26

## 2023-12-26 LAB
BILIRUB BLD-MCNC: NEGATIVE MG/DL
CLARITY, POC: CLEAR
COLOR UR: YELLOW
GLUCOSE UR STRIP-MCNC: NEGATIVE MG/DL
KETONES UR QL: NEGATIVE
LEUKOCYTE EST, POC: NEGATIVE
NITRITE UR-MCNC: NEGATIVE MG/ML
PH UR: 7 [PH] (ref 5–8)
PROT UR STRIP-MCNC: NEGATIVE MG/DL
RBC # UR STRIP: NEGATIVE /UL
SP GR UR: 1.03 (ref 1–1.03)
UROBILINOGEN UR QL: NORMAL

## 2023-12-26 NOTE — PROGRESS NOTES
"Subjective     Chief Complaint   Patient presents with    Pregnancy Problem     Spotting/cramping in early pregnancy that started off and on all weekend       Davida Quinonez is a 28 y.o.  whose LMP is Patient's last menstrual period was 10/19/2023 (exact date). presents with spotting that is brown in color that has since slowed down tremendously   She had some cramping that started Saturday no bleeding  Cramping is better now  She is taking progesterone, asa and pnv daily     The following portions of the patient's history were reviewed and updated as appropriate:vital signs, allergies, current medications, past medical history, past social history, past surgical history, and problem list      Review of Systems   Pertinent items are noted in HPI.     Objective      /80   Ht 175.3 cm (69\")   Wt 136 kg (299 lb)   LMP 10/19/2023 (Exact Date)   BMI 44.15 kg/m²     Physical Exam    General:   alert   Heart: Not performed today   Lungs: Not performed today.   Breast: Not performed today   Neck: Not performed today   Abdomen: Not performed today   CVA: Not performed today   Pelvis: Not performed today   Extremities: Extremities normal, atraumatic, no cyanosis or edema   Neurologic: AOx3. Gait normal. Reflexes and motor strength normal and symmetric. Cranial nerves 2-12 and sensation grossly intact.   Psychiatric: Normal affect, judgement, and mood       Lab Review   Labs: No data reviewed    Imaging   Ultrasound - Pelvic Vaginal    Assessment & Plan     ASSESSMENT  1. 8 weeks gestation of pregnancy    2. Screening for blood or protein in urine    3. Bicornuate uterus    4. Rh negative status during pregnancy in first trimester    5. Spotting in pregnancy        Impression:  Intrauterine pregnancy at 8w4d  Viable first trimester gestation,   PLAN  1.   Orders Placed This Encounter   Procedures    Rhogam Immune Globulin Immunization    POC Urinalysis Dipstick       2. Medications prescribed this " encounter:      No orders of the defined types were placed in this encounter.      3. Previous JORGE LUIS is resolved discussed this and findings of s/s today discussed any bleeding and or cramping needs to be evaluated if office is closed go to ER   Has appt with Dr Barrera next week Sab precautions reviewed rhogam today as precaution    Follow up: 1 week    Yadi Long, APRN  12/26/2023

## 2023-12-27 ENCOUNTER — TELEPHONE (OUTPATIENT)
Dept: OBSTETRICS AND GYNECOLOGY | Age: 28
End: 2023-12-27
Payer: COMMERCIAL

## 2023-12-28 ENCOUNTER — OFFICE VISIT (OUTPATIENT)
Dept: OBSTETRICS AND GYNECOLOGY | Age: 28
End: 2023-12-28
Payer: COMMERCIAL

## 2023-12-28 VITALS
SYSTOLIC BLOOD PRESSURE: 128 MMHG | BODY MASS INDEX: 43.4 KG/M2 | DIASTOLIC BLOOD PRESSURE: 84 MMHG | HEIGHT: 69 IN | WEIGHT: 293 LBS

## 2023-12-28 DIAGNOSIS — Z67.91 RH NEGATIVE STATUS DURING PREGNANCY IN FIRST TRIMESTER: ICD-10-CM

## 2023-12-28 DIAGNOSIS — O26.891 RH NEGATIVE STATUS DURING PREGNANCY IN FIRST TRIMESTER: ICD-10-CM

## 2023-12-28 DIAGNOSIS — Q51.3 BICORNUATE UTERUS: ICD-10-CM

## 2023-12-28 DIAGNOSIS — O26.859 SPOTTING IN PREGNANCY: Primary | ICD-10-CM

## 2023-12-28 NOTE — PROGRESS NOTES
"Subjective     Chief Complaint   Patient presents with    Gynecologic Exam     Bleeding in early pregnancy,brown in color, passed a small clot US today       Davida Quinonez is a 28 y.o.  whose LMP is Patient's last menstrual period was 10/19/2023 (exact date).     Pt being seen today for brown spotting in pregnancy  She was seen two days ago and JORGE LUIS had resolved  She had a small brown clot about dime sized that she passed when she used the restroom  No red bleeding, very light brown occl  US reassuring   She received rhogam on   No cramping or pelvic pain  Pt of Dr. Barrera     No Additional Complaints Reported    The following portions of the patient's history were reviewed and updated as appropriate:vital signs, allergies, current medications, past medical history, past social history, past surgical history, and problem list      Review of Systems   Pertinent items are noted in HPI.     Objective      /84   Ht 175.3 cm (69\")   Wt 136 kg (300 lb)   LMP 10/19/2023 (Exact Date)   BMI 44.30 kg/m²     Physical Exam    General:   alert, no distress, and morbidly obese   Heart: Not performed today   Lungs: Not performed today.   Breast: Not performed today   Neck: na   Abdomen: {Not performed today   CVA: Not performed today   Pelvis: Not performed   Extremities: Not performed today   Neurologic: negative   Psychiatric: Normal affect, judgement, and mood       Lab Review   Labs: Blood type     Imaging   Ultrasound - Pelvic Vaginal    Assessment & Plan     ASSESSMENT  1. Spotting in pregnancy    2. Rh negative status during pregnancy in first trimester    3. Bicornuate uterus        PLAN  1. No orders of the defined types were placed in this encounter.      2. Medications prescribed this encounter:      No orders of the defined types were placed in this encounter.      3. US reassuring. SAB warnings. Keep scheduled f/u with Dr Barrera on .      Shu Lopez, SCOTT  2023           "

## 2024-01-02 ENCOUNTER — INITIAL PRENATAL (OUTPATIENT)
Dept: OBSTETRICS AND GYNECOLOGY | Age: 29
End: 2024-01-02
Payer: COMMERCIAL

## 2024-01-02 VITALS — DIASTOLIC BLOOD PRESSURE: 68 MMHG | BODY MASS INDEX: 44.15 KG/M2 | WEIGHT: 293 LBS | SYSTOLIC BLOOD PRESSURE: 120 MMHG

## 2024-01-02 DIAGNOSIS — O34.219 PREVIOUS CESAREAN DELIVERY AFFECTING PREGNANCY: ICD-10-CM

## 2024-01-02 DIAGNOSIS — O26.891 RH NEGATIVE STATUS DURING PREGNANCY IN FIRST TRIMESTER: ICD-10-CM

## 2024-01-02 DIAGNOSIS — Z13.89 SCREENING FOR BLOOD OR PROTEIN IN URINE: ICD-10-CM

## 2024-01-02 DIAGNOSIS — O99.210 OBESITY IN PREGNANCY, ANTEPARTUM: ICD-10-CM

## 2024-01-02 DIAGNOSIS — Z34.81 PRENATAL CARE, SUBSEQUENT PREGNANCY IN FIRST TRIMESTER: Primary | ICD-10-CM

## 2024-01-02 DIAGNOSIS — Q51.3 BICORNUATE UTERUS: ICD-10-CM

## 2024-01-02 DIAGNOSIS — Z67.91 RH NEGATIVE STATUS DURING PREGNANCY IN FIRST TRIMESTER: ICD-10-CM

## 2024-01-02 PROBLEM — R73.09 ELEVATED HEMOGLOBIN A1C: Status: ACTIVE | Noted: 2024-01-02

## 2024-01-02 LAB
BILIRUB BLD-MCNC: NEGATIVE MG/DL
GLUCOSE UR STRIP-MCNC: NEGATIVE MG/DL
KETONES UR QL: NEGATIVE
LEUKOCYTE EST, POC: ABNORMAL
NITRITE UR-MCNC: NEGATIVE MG/ML
PH UR: 5.5 [PH] (ref 5–8)
PROT UR STRIP-MCNC: NEGATIVE MG/DL
RBC # UR STRIP: ABNORMAL /UL
SP GR UR: 1.03 (ref 1–1.03)
UROBILINOGEN UR QL: ABNORMAL

## 2024-01-02 NOTE — PROGRESS NOTES
Chief Complaint   Patient presents with    Initial Prenatal Visit     CC: Ob Intake, 9w5d, no complaints, pt states she received her flu shot on 10/23 but does not remember what facility        HPI: 28 y.o.  at 9w4d presents for OB intake    Vitals:    24 0822   BP: 120/68   Weight: 136 kg (299 lb)     Total weight gain for pregnancy:  -0.454 kg (-1 lb)    Review of systems:   Gen: negative  CV:     negative  GI: negative  :   negative  MS:    negative  Neuro: negative  Pul: negative    A/P  1. Intrauterine pregnancy at 9w4d   2. Pregnancy Risk:  HIGH RISK        Diagnoses and all orders for this visit:    1. Prenatal care, subsequent pregnancy in first trimester (Primary)    2. Screening for blood or protein in urine  -     POC Urinalysis Dipstick    3. Bicornuate uterus    4. Obesity in pregnancy, antepartum    5. Rh negative status during pregnancy in first trimester    6. Previous  delivery affecting pregnancy        Nutrition and weight gain were addressed.  -----------------------  PLAN:   Return in about 4 weeks (around 2024), or ob check.  Obesity-plans NIPT at after 11 weeks  OB intake completed  Rh--RhoGAM at 28 weeks  Prior  section-desires repeat at 39 weeks  Elevated hemoglobin A1c-early 1 hour glucose test  DC progesterone at 11 weeks  Continue baby aspirin    Yadi Barrera MD  2024 09:03 EST

## 2024-01-11 ENCOUNTER — TELEPHONE (OUTPATIENT)
Dept: OBSTETRICS AND GYNECOLOGY | Age: 29
End: 2024-01-11
Payer: COMMERCIAL

## 2024-01-11 NOTE — TELEPHONE ENCOUNTER
Was more like a mix of brownish and light red mucous discharge , currently not having any spotting or cramping .

## 2024-01-11 NOTE — TELEPHONE ENCOUNTER
----- Message from Davida Quinonez sent at 1/10/2024  4:46 PM EST -----  Regarding: Spotting   Contact: [phone]  Hello,    I started spotting again today. It’s light and I having mild cramps but nothing serious.

## 2024-01-15 ENCOUNTER — LAB (OUTPATIENT)
Dept: OBSTETRICS AND GYNECOLOGY | Age: 29
End: 2024-01-15
Payer: COMMERCIAL

## 2024-01-15 DIAGNOSIS — Z3A.11 11 WEEKS GESTATION OF PREGNANCY: Primary | ICD-10-CM

## 2024-01-16 ENCOUNTER — CLINICAL SUPPORT (OUTPATIENT)
Dept: OBSTETRICS AND GYNECOLOGY | Age: 29
End: 2024-01-16
Payer: COMMERCIAL

## 2024-01-16 DIAGNOSIS — Z34.81 PRENATAL CARE, SUBSEQUENT PREGNANCY, FIRST TRIMESTER: Primary | ICD-10-CM

## 2024-01-26 ENCOUNTER — ROUTINE PRENATAL (OUTPATIENT)
Dept: OBSTETRICS AND GYNECOLOGY | Age: 29
End: 2024-01-26
Payer: COMMERCIAL

## 2024-01-26 VITALS — WEIGHT: 293 LBS | BODY MASS INDEX: 44.75 KG/M2 | DIASTOLIC BLOOD PRESSURE: 72 MMHG | SYSTOLIC BLOOD PRESSURE: 112 MMHG

## 2024-01-26 DIAGNOSIS — O99.210 OBESITY IN PREGNANCY, ANTEPARTUM: ICD-10-CM

## 2024-01-26 DIAGNOSIS — O26.892 RH NEGATIVE STATUS DURING PREGNANCY IN SECOND TRIMESTER: ICD-10-CM

## 2024-01-26 DIAGNOSIS — Z3A.13 13 WEEKS GESTATION OF PREGNANCY: Primary | ICD-10-CM

## 2024-01-26 DIAGNOSIS — O46.90 VAGINAL BLEEDING IN PREGNANCY: ICD-10-CM

## 2024-01-26 DIAGNOSIS — Z67.91 RH NEGATIVE STATUS DURING PREGNANCY IN SECOND TRIMESTER: ICD-10-CM

## 2024-01-26 PROCEDURE — 0502F SUBSEQUENT PRENATAL CARE: CPT

## 2024-01-26 NOTE — PROGRESS NOTES
Chief Complaint   Patient presents with    Pregnancy Problem     Pt c/o cramping and bright red bleeding in early pregnancy. U/s today to evaluate        HPI: 28 y.o.  at 13w0d   Yesterday noted  bright red bleeding, every time she wiped for 30 minutes  Has since stopped  It was only when she wiped it slowed down and she went to sleep  She had some cramping that has stopped now   No IC  Rhogam 23    Vitals:    24 1208   BP: 112/72   Weight: (!) 137 kg (303 lb)     Total weight gain for pregnancy:  1.361 kg (3 lb)      A/P  1. Intrauterine pregnancy at 13w0d   2. Pregnancy Risk:  HIGH RISK    Impression:  Intrauterine pregnancy at 13w0d  Fetal heart rate: 164  RT ovary wnl  Lt ovary not visualized         Diagnoses and all orders for this visit:    1. 13 weeks gestation of pregnancy (Primary)    2. Obesity in pregnancy, antepartum    3. Rh negative status during pregnancy in second trimester    4. Vaginal bleeding in pregnancy          -----------------------  PLAN: TVUS reassuring  4 weeks with Dr Barrera   Pelvic rest  SAB precautions   Return or call the office for any vb and/or cramping  Return in about 4 weeks (around 2024).      Yadi Long, APRN  2024 12:47 EST

## 2024-01-30 ENCOUNTER — TELEPHONE (OUTPATIENT)
Dept: OBSTETRICS AND GYNECOLOGY | Age: 29
End: 2024-01-30

## 2024-02-06 ENCOUNTER — TELEPHONE (OUTPATIENT)
Dept: OBSTETRICS AND GYNECOLOGY | Age: 29
End: 2024-02-06
Payer: COMMERCIAL

## 2024-02-06 NOTE — TELEPHONE ENCOUNTER
----- Message from Davida Quinonez sent at 2/6/2024 10:31 AM EST -----  Regarding: Spotting  Contact: [phone]  Hello, I’m spotting again when I wiped. It is like a dark pink color.

## 2024-02-10 ENCOUNTER — PATIENT MESSAGE (OUTPATIENT)
Dept: OBSTETRICS AND GYNECOLOGY | Age: 29
End: 2024-02-10
Payer: COMMERCIAL

## 2024-02-13 ENCOUNTER — TELEPHONE (OUTPATIENT)
Dept: OBSTETRICS AND GYNECOLOGY | Age: 29
End: 2024-02-13
Payer: COMMERCIAL

## 2024-02-19 ENCOUNTER — TELEPHONE (OUTPATIENT)
Dept: OBSTETRICS AND GYNECOLOGY | Age: 29
End: 2024-02-19
Payer: COMMERCIAL

## 2024-02-27 ENCOUNTER — ROUTINE PRENATAL (OUTPATIENT)
Dept: OBSTETRICS AND GYNECOLOGY | Age: 29
End: 2024-02-27
Payer: COMMERCIAL

## 2024-02-27 VITALS — WEIGHT: 293 LBS | SYSTOLIC BLOOD PRESSURE: 120 MMHG | BODY MASS INDEX: 46.37 KG/M2 | DIASTOLIC BLOOD PRESSURE: 82 MMHG

## 2024-02-27 DIAGNOSIS — O99.210 OBESITY IN PREGNANCY, ANTEPARTUM: ICD-10-CM

## 2024-02-27 DIAGNOSIS — Z67.91 RH NEGATIVE STATUS DURING PREGNANCY IN SECOND TRIMESTER: ICD-10-CM

## 2024-02-27 DIAGNOSIS — E28.2 PCOS (POLYCYSTIC OVARIAN SYNDROME): ICD-10-CM

## 2024-02-27 DIAGNOSIS — Q51.3 BICORNUATE UTERUS: ICD-10-CM

## 2024-02-27 DIAGNOSIS — R73.09 ELEVATED HEMOGLOBIN A1C: ICD-10-CM

## 2024-02-27 DIAGNOSIS — Z34.82 PRENATAL CARE, SUBSEQUENT PREGNANCY IN SECOND TRIMESTER: Primary | ICD-10-CM

## 2024-02-27 DIAGNOSIS — O34.219 PREVIOUS CESAREAN DELIVERY AFFECTING PREGNANCY: ICD-10-CM

## 2024-02-27 DIAGNOSIS — Z13.89 SCREENING FOR BLOOD OR PROTEIN IN URINE: ICD-10-CM

## 2024-02-27 DIAGNOSIS — O26.892 RH NEGATIVE STATUS DURING PREGNANCY IN SECOND TRIMESTER: ICD-10-CM

## 2024-02-27 LAB
BILIRUB BLD-MCNC: NEGATIVE MG/DL
GLUCOSE UR STRIP-MCNC: NEGATIVE MG/DL
KETONES UR QL: NEGATIVE
LEUKOCYTE EST, POC: NEGATIVE
NITRITE UR-MCNC: NEGATIVE MG/ML
PH UR: 6 [PH] (ref 5–8)
PROT UR STRIP-MCNC: ABNORMAL MG/DL
RBC # UR STRIP: NEGATIVE /UL
SP GR UR: 1.03 (ref 1–1.03)
UROBILINOGEN UR QL: ABNORMAL

## 2024-02-27 PROCEDURE — 0502F SUBSEQUENT PRENATAL CARE: CPT | Performed by: OBSTETRICS & GYNECOLOGY

## 2024-02-27 NOTE — PROGRESS NOTES
Chief Complaint   Patient presents with    Routine Prenatal Visit     Cc: ob check , pt denies any VB potting or cramping doing well        HPI: 29 y.o.  at 17w4d presents for prenatal care    Vitals:    24 1507   BP: 120/82   Weight: (!) 142 kg (314 lb)     Total weight gain for pregnancy:  6.35 kg (14 lb)    Review of systems:   Gen: negative  CV:     negative  GI: negative  :   negative  MS:    negative  Neuro: negative  Pul: negative    A/P  1. Intrauterine pregnancy at 17w4d   2. Pregnancy Risk:  HIGH RISK        Diagnoses and all orders for this visit:    1. Prenatal care, subsequent pregnancy in second trimester (Primary)    2. Screening for blood or protein in urine  -     POC Urinalysis Dipstick    3. Bicornuate uterus    4. Elevated hemoglobin A1c    5. PCOS (polycystic ovarian syndrome)    6. Obesity in pregnancy, antepartum    7. Previous  delivery affecting pregnancy    8. Rh negative status during pregnancy in second trimester        Nutrition and weight gain were addressed.  -----------------------  PLAN:   Return in about 3 weeks (around 3/19/2024), or ob check/early one-hour gtt and anatomy US.  RH  Negative -RhoGAM at 28 weeks  Elevated hemoglobin A1c-early 1 hour next visit  Previous  section  Repeat at delivery  Yadi Barrera MD  2024 15:23 EST

## 2024-03-19 ENCOUNTER — ROUTINE PRENATAL (OUTPATIENT)
Dept: OBSTETRICS AND GYNECOLOGY | Age: 29
End: 2024-03-19
Payer: COMMERCIAL

## 2024-03-19 VITALS — BODY MASS INDEX: 46.37 KG/M2 | DIASTOLIC BLOOD PRESSURE: 72 MMHG | SYSTOLIC BLOOD PRESSURE: 120 MMHG | WEIGHT: 293 LBS

## 2024-03-19 DIAGNOSIS — O99.210 OBESITY IN PREGNANCY, ANTEPARTUM: ICD-10-CM

## 2024-03-19 DIAGNOSIS — R73.09 ELEVATED HEMOGLOBIN A1C: ICD-10-CM

## 2024-03-19 DIAGNOSIS — O26.892 RH NEGATIVE STATUS DURING PREGNANCY IN SECOND TRIMESTER: ICD-10-CM

## 2024-03-19 DIAGNOSIS — Z34.82 PRENATAL CARE, SUBSEQUENT PREGNANCY IN SECOND TRIMESTER: Primary | ICD-10-CM

## 2024-03-19 DIAGNOSIS — E28.2 PCOS (POLYCYSTIC OVARIAN SYNDROME): ICD-10-CM

## 2024-03-19 DIAGNOSIS — Q51.3 BICORNUATE UTERUS: ICD-10-CM

## 2024-03-19 DIAGNOSIS — Z13.0 SCREENING FOR IRON DEFICIENCY ANEMIA: ICD-10-CM

## 2024-03-19 DIAGNOSIS — Z67.91 RH NEGATIVE STATUS DURING PREGNANCY IN SECOND TRIMESTER: ICD-10-CM

## 2024-03-19 DIAGNOSIS — Z13.1 SCREENING FOR DIABETES MELLITUS: ICD-10-CM

## 2024-03-19 DIAGNOSIS — O34.219 PREVIOUS CESAREAN DELIVERY AFFECTING PREGNANCY: ICD-10-CM

## 2024-03-19 LAB
BASOPHILS # BLD AUTO: 0.03 10*3/MM3 (ref 0–0.2)
BASOPHILS NFR BLD AUTO: 0.3 % (ref 0–1.5)
BILIRUB BLD-MCNC: NEGATIVE MG/DL
EOSINOPHIL # BLD AUTO: 0.1 10*3/MM3 (ref 0–0.4)
EOSINOPHIL NFR BLD AUTO: 1.1 % (ref 0.3–6.2)
ERYTHROCYTE [DISTWIDTH] IN BLOOD BY AUTOMATED COUNT: 12.1 % (ref 12.3–15.4)
GLUCOSE 1H P 50 G GLC PO SERPL-MCNC: 107 MG/DL (ref 65–139)
GLUCOSE UR STRIP-MCNC: NEGATIVE MG/DL
HCT VFR BLD AUTO: 35.8 % (ref 34–46.6)
HGB BLD-MCNC: 11.8 G/DL (ref 12–15.9)
IMM GRANULOCYTES # BLD AUTO: 0.05 10*3/MM3 (ref 0–0.05)
IMM GRANULOCYTES NFR BLD AUTO: 0.5 % (ref 0–0.5)
KETONES UR QL: ABNORMAL
LEUKOCYTE EST, POC: NEGATIVE
LYMPHOCYTES # BLD AUTO: 1.78 10*3/MM3 (ref 0.7–3.1)
LYMPHOCYTES NFR BLD AUTO: 18.8 % (ref 19.6–45.3)
MCH RBC QN AUTO: 28.1 PG (ref 26.6–33)
MCHC RBC AUTO-ENTMCNC: 33 G/DL (ref 31.5–35.7)
MCV RBC AUTO: 85.2 FL (ref 79–97)
MONOCYTES # BLD AUTO: 0.4 10*3/MM3 (ref 0.1–0.9)
MONOCYTES NFR BLD AUTO: 4.2 % (ref 5–12)
NEUTROPHILS # BLD AUTO: 7.13 10*3/MM3 (ref 1.7–7)
NEUTROPHILS NFR BLD AUTO: 75.1 % (ref 42.7–76)
NITRITE UR-MCNC: NEGATIVE MG/ML
NRBC BLD AUTO-RTO: 0 /100 WBC (ref 0–0.2)
PH UR: 7 [PH] (ref 5–8)
PLATELET # BLD AUTO: 312 10*3/MM3 (ref 140–450)
PROT UR STRIP-MCNC: NEGATIVE MG/DL
RBC # BLD AUTO: 4.2 10*6/MM3 (ref 3.77–5.28)
RBC # UR STRIP: NEGATIVE /UL
SP GR UR: 1.02 (ref 1–1.03)
UROBILINOGEN UR QL: NORMAL
WBC # BLD AUTO: 9.49 10*3/MM3 (ref 3.4–10.8)

## 2024-03-19 NOTE — PROGRESS NOTES
Chief Complaint   Patient presents with    Routine Prenatal Visit     Cc: ob check/early one-hour gtt and anatomy US , patient denies any VB or abdominal cramping , doing well        HPI: 29 y.o.  at 20w4d presents for prenatal care       Vitals:    24 0901   BP: 120/72   Weight: (!) 142 kg (314 lb)     Total weight gain for pregnancy:  6.35 kg (14 lb)    Review of systems:   Gen: negative  CV:     negative  GI: negative  :   negative and good fetal movement noted   MS:    negative  Neuro: negative  Pul: negative    A/P  1. Intrauterine pregnancy at 20w4d   2. Pregnancy Risk:  HIGH RISK        Diagnoses and all orders for this visit:    1. Screening for iron deficiency anemia (Primary)  -     CBC & Differential  -     POC Urinalysis Dipstick    2. Screening for diabetes mellitus  -     Gestational Screen 1 Hr ([facility])    3. Elevated hemoglobin A1c    4. PCOS (polycystic ovarian syndrome)    5. Bicornuate uterus    6. Obesity in pregnancy, antepartum    7. Previous  delivery affecting pregnancy    8. Rh negative status during pregnancy in second trimester    9. Prenatal care, subsequent pregnancy in second trimester        Nutrition and weight gain were addressed.  -----------------------  PLAN:   Return in about 4 weeks (around 2024), or ob check and growth US.  Obesity and elevated hemoglobin A1c-early 1 hour glucose test today  Normal completed anatomy today  Normal cervical length today      Yadi Barrera MD  3/19/2024 09:09 EDT

## 2024-03-29 ENCOUNTER — HOSPITAL ENCOUNTER (EMERGENCY)
Facility: HOSPITAL | Age: 29
Discharge: HOME | End: 2024-03-29
Attending: OBSTETRICS & GYNECOLOGY | Admitting: OBSTETRICS & GYNECOLOGY
Payer: COMMERCIAL

## 2024-03-29 ENCOUNTER — TELEPHONE (OUTPATIENT)
Dept: OBSTETRICS AND GYNECOLOGY | Age: 29
End: 2024-03-29
Payer: COMMERCIAL

## 2024-03-29 VITALS
OXYGEN SATURATION: 99 % | HEART RATE: 109 BPM | TEMPERATURE: 97.9 F | SYSTOLIC BLOOD PRESSURE: 124 MMHG | DIASTOLIC BLOOD PRESSURE: 77 MMHG | RESPIRATION RATE: 16 BRPM

## 2024-03-29 LAB
BILIRUB UR QL STRIP: NEGATIVE
CLARITY UR: ABNORMAL
COLOR UR: YELLOW
GLUCOSE UR STRIP-MCNC: NEGATIVE MG/DL
HGB UR QL STRIP.AUTO: NEGATIVE
KETONES UR QL STRIP: ABNORMAL
LEUKOCYTE ESTERASE UR QL STRIP.AUTO: NEGATIVE
NITRITE UR QL STRIP: NEGATIVE
PH UR STRIP.AUTO: 5.5 [PH] (ref 5–8)
PROT UR QL STRIP: ABNORMAL
SP GR UR STRIP: 1.02 (ref 1–1.03)
UROBILINOGEN UR QL STRIP: ABNORMAL

## 2024-03-29 PROCEDURE — 87086 URINE CULTURE/COLONY COUNT: CPT | Performed by: OBSTETRICS & GYNECOLOGY

## 2024-03-29 PROCEDURE — 99283 EMERGENCY DEPT VISIT LOW MDM: CPT | Performed by: OBSTETRICS & GYNECOLOGY

## 2024-03-29 PROCEDURE — 81003 URINALYSIS AUTO W/O SCOPE: CPT | Performed by: OBSTETRICS & GYNECOLOGY

## 2024-03-29 NOTE — TELEPHONE ENCOUNTER
Patient called and states she is 22 weeks pregnant and having a lot of pelvic pain.  She is concerned.  Please advise.

## 2024-03-29 NOTE — NURSING NOTE
Patient given discharge information about when to return to the hospital. Patient verbalized understanding.

## 2024-03-30 LAB — BACTERIA SPEC AEROBE CULT: NORMAL

## 2024-03-31 ENCOUNTER — HOSPITAL ENCOUNTER (EMERGENCY)
Facility: HOSPITAL | Age: 29
Discharge: HOME | End: 2024-03-31
Attending: OBSTETRICS & GYNECOLOGY | Admitting: OBSTETRICS & GYNECOLOGY
Payer: COMMERCIAL

## 2024-03-31 VITALS
SYSTOLIC BLOOD PRESSURE: 129 MMHG | OXYGEN SATURATION: 100 % | HEART RATE: 91 BPM | TEMPERATURE: 98.9 F | DIASTOLIC BLOOD PRESSURE: 61 MMHG | RESPIRATION RATE: 16 BRPM

## 2024-03-31 LAB
ALBUMIN SERPL-MCNC: 4 G/DL (ref 3.5–5.2)
ALBUMIN/GLOB SERPL: 1.6 G/DL
ALP SERPL-CCNC: 78 U/L (ref 39–117)
ALT SERPL W P-5'-P-CCNC: 12 U/L (ref 1–33)
AMYLASE SERPL-CCNC: 31 U/L (ref 28–100)
ANION GAP SERPL CALCULATED.3IONS-SCNC: 13 MMOL/L (ref 5–15)
AST SERPL-CCNC: 14 U/L (ref 1–32)
BASOPHILS # BLD AUTO: 0.03 10*3/MM3 (ref 0–0.2)
BASOPHILS NFR BLD AUTO: 0.2 % (ref 0–1.5)
BILIRUB SERPL-MCNC: 0.2 MG/DL (ref 0–1.2)
BILIRUB UR QL STRIP: NEGATIVE
BUN SERPL-MCNC: 7 MG/DL (ref 6–20)
BUN/CREAT SERPL: 11.7 (ref 7–25)
CALCIUM SPEC-SCNC: 9.4 MG/DL (ref 8.6–10.5)
CHLORIDE SERPL-SCNC: 106 MMOL/L (ref 98–107)
CLARITY UR: ABNORMAL
CO2 SERPL-SCNC: 20 MMOL/L (ref 22–29)
COLOR UR: YELLOW
CREAT SERPL-MCNC: 0.6 MG/DL (ref 0.57–1)
DEPRECATED RDW RBC AUTO: 40.9 FL (ref 37–54)
EGFRCR SERPLBLD CKD-EPI 2021: 124 ML/MIN/1.73
EOSINOPHIL # BLD AUTO: 0.11 10*3/MM3 (ref 0–0.4)
EOSINOPHIL NFR BLD AUTO: 0.9 % (ref 0.3–6.2)
ERYTHROCYTE [DISTWIDTH] IN BLOOD BY AUTOMATED COUNT: 12.5 % (ref 12.3–15.4)
GLOBULIN UR ELPH-MCNC: 2.5 GM/DL
GLUCOSE SERPL-MCNC: 104 MG/DL (ref 65–99)
GLUCOSE UR STRIP-MCNC: NEGATIVE MG/DL
HCT VFR BLD AUTO: 35.6 % (ref 34–46.6)
HGB BLD-MCNC: 11.8 G/DL (ref 12–15.9)
HGB UR QL STRIP.AUTO: NEGATIVE
IMM GRANULOCYTES # BLD AUTO: 0.08 10*3/MM3 (ref 0–0.05)
IMM GRANULOCYTES NFR BLD AUTO: 0.6 % (ref 0–0.5)
KETONES UR QL STRIP: NEGATIVE
LEUKOCYTE ESTERASE UR QL STRIP.AUTO: NEGATIVE
LIPASE SERPL-CCNC: 19 U/L (ref 13–60)
LYMPHOCYTES # BLD AUTO: 2.47 10*3/MM3 (ref 0.7–3.1)
LYMPHOCYTES NFR BLD AUTO: 19.2 % (ref 19.6–45.3)
MCH RBC QN AUTO: 29.2 PG (ref 26.6–33)
MCHC RBC AUTO-ENTMCNC: 33.1 G/DL (ref 31.5–35.7)
MCV RBC AUTO: 88.1 FL (ref 79–97)
MONOCYTES # BLD AUTO: 0.98 10*3/MM3 (ref 0.1–0.9)
MONOCYTES NFR BLD AUTO: 7.6 % (ref 5–12)
NEUTROPHILS NFR BLD AUTO: 71.5 % (ref 42.7–76)
NEUTROPHILS NFR BLD AUTO: 9.21 10*3/MM3 (ref 1.7–7)
NITRITE UR QL STRIP: NEGATIVE
NRBC BLD AUTO-RTO: 0 /100 WBC (ref 0–0.2)
PH UR STRIP.AUTO: 7 [PH] (ref 5–8)
PLATELET # BLD AUTO: 310 10*3/MM3 (ref 140–450)
PMV BLD AUTO: 8.8 FL (ref 6–12)
POTASSIUM SERPL-SCNC: 4 MMOL/L (ref 3.5–5.2)
PROT SERPL-MCNC: 6.5 G/DL (ref 6–8.5)
PROT UR QL STRIP: NEGATIVE
RBC # BLD AUTO: 4.04 10*6/MM3 (ref 3.77–5.28)
SODIUM SERPL-SCNC: 139 MMOL/L (ref 136–145)
SP GR UR STRIP: 1.01 (ref 1–1.03)
UROBILINOGEN UR QL STRIP: ABNORMAL
WBC NRBC COR # BLD AUTO: 12.88 10*3/MM3 (ref 3.4–10.8)

## 2024-03-31 PROCEDURE — 96360 HYDRATION IV INFUSION INIT: CPT | Performed by: OBSTETRICS & GYNECOLOGY

## 2024-03-31 PROCEDURE — 81003 URINALYSIS AUTO W/O SCOPE: CPT | Performed by: OBSTETRICS & GYNECOLOGY

## 2024-03-31 PROCEDURE — 85025 COMPLETE CBC W/AUTO DIFF WBC: CPT | Performed by: OBSTETRICS & GYNECOLOGY

## 2024-03-31 PROCEDURE — 25810000003 LACTATED RINGERS SOLUTION: Performed by: OBSTETRICS & GYNECOLOGY

## 2024-03-31 PROCEDURE — 83690 ASSAY OF LIPASE: CPT | Performed by: OBSTETRICS & GYNECOLOGY

## 2024-03-31 PROCEDURE — 99283 EMERGENCY DEPT VISIT LOW MDM: CPT | Performed by: OBSTETRICS & GYNECOLOGY

## 2024-03-31 PROCEDURE — 82150 ASSAY OF AMYLASE: CPT | Performed by: OBSTETRICS & GYNECOLOGY

## 2024-03-31 PROCEDURE — 80053 COMPREHEN METABOLIC PANEL: CPT | Performed by: OBSTETRICS & GYNECOLOGY

## 2024-03-31 RX ORDER — SODIUM CHLORIDE 0.9 % (FLUSH) 0.9 %
10 SYRINGE (ML) INJECTION AS NEEDED
Status: DISCONTINUED | OUTPATIENT
Start: 2024-03-31 | End: 2024-03-31 | Stop reason: HOSPADM

## 2024-03-31 RX ORDER — ACETAMINOPHEN 500 MG
1000 TABLET ORAL ONCE
Status: COMPLETED | OUTPATIENT
Start: 2024-03-31 | End: 2024-03-31

## 2024-03-31 RX ORDER — LIDOCAINE HYDROCHLORIDE 20 MG/ML
5 SOLUTION OROPHARYNGEAL ONCE
Status: COMPLETED | OUTPATIENT
Start: 2024-03-31 | End: 2024-03-31

## 2024-03-31 RX ORDER — ALUMINA, MAGNESIA, AND SIMETHICONE 2400; 2400; 240 MG/30ML; MG/30ML; MG/30ML
15 SUSPENSION ORAL ONCE
Status: COMPLETED | OUTPATIENT
Start: 2024-03-31 | End: 2024-03-31

## 2024-03-31 RX ORDER — SODIUM CHLORIDE 0.9 % (FLUSH) 0.9 %
10 SYRINGE (ML) INJECTION EVERY 12 HOURS SCHEDULED
Status: DISCONTINUED | OUTPATIENT
Start: 2024-03-31 | End: 2024-03-31 | Stop reason: HOSPADM

## 2024-03-31 RX ADMIN — SODIUM CHLORIDE, POTASSIUM CHLORIDE, SODIUM LACTATE AND CALCIUM CHLORIDE 1000 ML: 600; 310; 30; 20 INJECTION, SOLUTION INTRAVENOUS at 19:20

## 2024-03-31 RX ADMIN — LIDOCAINE HYDROCHLORIDE 5 ML: 20 SOLUTION OROPHARYNGEAL at 19:10

## 2024-03-31 RX ADMIN — ACETAMINOPHEN 1000 MG: 500 TABLET ORAL at 18:19

## 2024-03-31 RX ADMIN — SODIUM CHLORIDE, POTASSIUM CHLORIDE, SODIUM LACTATE AND CALCIUM CHLORIDE 1000 ML: 600; 310; 30; 20 INJECTION, SOLUTION INTRAVENOUS at 18:19

## 2024-03-31 RX ADMIN — ALUMINUM HYDROXIDE, MAGNESIUM HYDROXIDE, AND SIMETHICONE 15 ML: 2400; 240; 2400 SUSPENSION ORAL at 19:09

## 2024-03-31 NOTE — OBED NOTES
"[facility] [place] [facility] [place] Name: Davida Quinonez  YOB: 1995  MRN: [id number]  Admission Date: 3/31/2024  5:12 PM  Date of Service: 3/31/2024    Chief Complaint: Abdominal Pain (Pt presents to [facility] with abd pain that began 9am 3/30. States \"she cannot describe the type of pain, just slight cramping as if I was really hungry\". States she has not gotten out of bed all weekend. States she has only drank half of her water bottle today. Had lunch and breakfast. Had a BM this afternoon. Endorses +Fm. Denies VB or LOF. Denies ctx. FHT audible via doppler)        Subjective     Davida Quinonez is a 29 y.o. female  at 22w2d with Estimated Date of Delivery: 24 who presents with the chief complaint listed above.  When asked to point where pain was patient made a broad sweeping motion over her upper abdomen.  She ranks the pain a 3 out of 10.  Patient reports that she has a long history of abdominal pain and that this pain is actually some molar to what she has had in the past, having had her gallbladder removed as well as her appendix.  Patient denies fever and change in appetite though she reports she was not able to drink the amount of fluid that she knew she should.  Patient denies dysuria, nausea, vomiting, diarrhea or shortness of breath.  Patient reports her bowel and bladder habits are typical.   Post IV fluids Tylenol and GI cocktail patient feeling better and desires to go home     She sees Yaid Barrera MD for her prenatal care. Her pregnancy has been complicated by: Obese, elevated hemoglobin A1c, bicornate uterus, Rh-, history of anxiety and depression, ADHD, history of large for gestational age baby, prior , sleep apnea, history of cholecystectomy and appendectomy    She describes fetal movement as normal.  She denies rupture of membranes.  She denies vaginal bleeding. She is not feeling contractions.        Objective   Patient Active Problem List    Diagnosis     " Elevated hemoglobin A1c [R73.09]     Obesity in pregnancy, antepartum [O99.210]     Previous  delivery affecting pregnancy [O34.219]     Rh negative status during pregnancy [O26.899, Z67.91]     PCOS (polycystic ovarian syndrome) [E28.2]     Bicornuate uterus [Q51.3]         OB History    Para Term  AB Living   4 1 1 0 1 1   SAB IAB Ectopic Molar Multiple Live Births   1 0 0 0 0 1      # Outcome Date GA Lbr Celestine/2nd Weight Sex Type Anes PTL Lv   4 Current            3 Term 21 38w3d  4080 g (8 lb 15.9 oz) M CS-LTranv Spinal N MIRIAM      Birth Comments: Alexei 1      Name: Manuelito       Apgar1: 8  Apgar5: 9   2 SAB 20 7w3d             Birth Comments: with D&C   1                Obstetric Comments   23 - LMP rejected - IUP at 6- 4 weeks - BRANDO 24 - [facility]    3/19/24 - 20-4 weeks -normal completed anatomy, normal cervical length.-[facility]         Past Medical History:   Diagnosis Date    Abnormal Pap smear of cervix     ADHD (attention deficit hyperactivity disorder)     never medicated    Anxiety     not medicated during pregnancy    Arthritis     Clinical diagnosis of COVID-19 2022    HEADACHE ONLY    Depression     not medicated during pregnancy    HPV in female     Impingement syndrome of left shoulder     Miscarriage     Obesity 2008    Sleep apnea     cpap occasionally       Past Surgical History:   Procedure Laterality Date    ADENOIDECTOMY      APPENDECTOMY N/A 2023    Procedure: APPENDECTOMY LAPAROSCOPIC;  Surgeon: Brandt Lemus Jr., MD;  Location: [facility] OR;  Service: General;  Laterality: N/A;     SECTION N/A 2021    Procedure:  SECTION PRIMARY;  Surgeon: Yadi Barrera MD;  Location: [facility] LABOR DELIVERY;  Service: Obstetrics/Gynecology;  Laterality: N/A;    D & C WITH SUCTION N/A 2020    Procedure: DILATATION AND CURETTAGE WITH SUCTION;  Surgeon: Yadi Barrera MD;  Location: [facility] OR;  Service:  Obstetrics/Gynecology;  Laterality: N/A;    D & C WITH SUCTION N/A 6/7/2023    Procedure: DILATATION AND CURETTAGE WITH SUCTION;  Surgeon: Yadi Barrera MD;  Location: [facility] OR;  Service: Obstetrics/Gynecology;  Laterality: N/A;    EAR TUBES      JOINT MANIPULATION Left 7/26/2022    Procedure: LEFT SHOULDER MANIPULATION;  Surgeon: Navid Hinds MD;  [facility] [facility];  Service: Orthopedics;  Laterality: Left;    LAPAROSCOPIC CHOLECYSTECTOMY      SHOULDER ARTHROSCOPY W/ SUPERIOR LABRAL ANTERIOR POSTERIOR REPAIR Left 03/29/2022    Procedure: LEFT SHOULDER ARTHROSCOPY LBARAL REPAIR WITH DISTAL CLAVICLE EXCISION AND SUBACROMIAL DECOMPRESSION;  Surgeon: Navid Hinds MD;  Location: [facility] OR [facility];  Service: Orthopedics;  Laterality: Left;    TONSILLECTOMY      WISDOM TOOTH EXTRACTION         No current facility-administered medications on file prior to encounter.     Current Outpatient Medications on File Prior to Encounter   Medication Sig Dispense Refill    aspirin 81 MG EC tablet Take 1 tablet by mouth Daily. 90 tablet 2    metFORMIN ER (GLUCOPHAGE-XR) 500 MG 24 hr tablet Take 2 tablets by mouth Daily With Breakfast. 60 tablet 12    prenatal vitamin (prenatal, CLASSIC, vitamin) tablet Take 1 tablet by mouth Daily.         No Known Allergies    Family History   Problem Relation Age of Onset    Thyroid disease Mother     Depression Mother     Anxiety disorder Mother     Miscarriages / Stillbirths Mother     Vision loss Mother     Hypertension Father     Hyperlipidemia Father     Cancer Maternal Grandmother     Pancreatic cancer Maternal Grandmother     Pancreatitis Maternal Grandmother     No Known Problems Brother     No Known Problems Sister     Diabetes Maternal Grandfather     Ovarian cancer Other     Malig Hyperthermia Neg Hx     Breast cancer Neg Hx     Uterine cancer Neg Hx     Colon cancer Neg Hx        Social History     Socioeconomic History    Marital status:      Spouse  name: adolfo   Tobacco Use    Smoking status: Never    Smokeless tobacco: Never   Vaping Use    Vaping status: Never Used   Substance and Sexual Activity    Alcohol use: No    Drug use: No    Sexual activity: Yes     Partners: Male     Birth control/protection: None           Review of Systems   Constitutional:  Negative for chills and fever.   HENT: Negative.     Eyes:  Negative for photophobia and visual disturbance.   Respiratory:  Negative for shortness of breath.    Cardiovascular:  Negative for chest pain.   Gastrointestinal:  Positive for abdominal pain. Negative for nausea.   Psychiatric/Behavioral:  The patient is not nervous/anxious.           PHYSICAL EXAM:      VITAL SIGNS:  Vitals:    03/31/24 1737   BP: 129/61   Pulse: 91   Resp: 16   Temp: 98.9 °F (37.2 °C)   TempSrc: Oral   SpO2: 100%            FHT'S:    positive doppler                                     PHYSICAL EXAM:      General: well developed; well nourished  no acute distress   Heart: Not performed.   Lungs   breathing is unlabored   Abdomen: Gravid and non tender     Extremities: trace edema, DTRs 1 plus, no clonus       Cervix: Per RN closed        Contractions:   none                    LABS AND TESTING ORDERED:  Uterine and fetal monitoring  Urinalysis  CBC, CMP, lipase, amylase, lactic acid    LAB RESULTS:    No results found for this or any previous visit (from the past 24 hour(s)).    Lab Results   Component Value Date    ABO A 12/14/2023    RH Negative 12/14/2023       Lab Results   Component Value Date    STREPGPB Negative 03/22/2021                 External Prenatal Results       Pregnancy Outside Results - Transcribed From Office Records - See Scanned Records For Details       Test Value Date Time    ABO  A  12/14/23 1344    Rh  Negative  12/14/23 1344    Antibody Screen  Negative  12/14/23 1344    Varicella IgG  2,299 index 12/14/23 1344    Rubella  3.31 index 12/14/23 1344    Hgb  11.8 g/dL 03/19/24 0850       13.4 g/dL 12/14/23  1344    Hct  35.8 % 24 0850       41.5 % 23 1344    Glucose Fasting GTT       Glucose Tolerance Test 1 hour       Glucose Tolerance Test 3 hour       Gonorrhea (discrete)  Negative  23 1453    Chlamydia (discrete)  Negative  23 1453    RPR  Non Reactive  23 1344    VDRL       Syphilis Antibody       HBsAg  Negative  23 1344    Herpes Simplex Virus PCR       Herpes Simplex VIrus Culture       HIV  Non Reactive  23 1344    Hep C RNA Quant PCR       Hep C Antibody  Non Reactive  23 1344    AFP  28.9 ng/mL 20 0920    Group B Strep  Negative  21 1541    GBS Susceptibility to Clindamycin       GBS Susceptibility to Erythromycin       Fetal Fibronectin       Genetic Testing, Maternal Blood                 Drug Screening       Test Value Date Time    Urine Drug Screen       Amphetamine Screen       Barbiturate Screen       Benzodiazepine Screen       Methadone Screen       Phencyclidine Screen       Opiates Screen       THC Screen       Cocaine Screen       Propoxyphene Screen       Buprenorphine Screen       Methamphetamine Screen       Oxycodone Screen       Tricyclic Antidepressants Screen                 Legend    ^: Historical                              Impression:   @ 22w2d .  Final Diagnosis: upper abdominal pain, OB exam benign, labs within normal limits    Plan:,  1.  Give patient 2 L of IV fluids over the next 2 hours, Tylenol p.o., give GI cocktail, review labs  2. Plan of care has been reviewed with patient along with risks, benefits of treatment.   All questions have been answered. Call health care provider for any further concerns and keep office appointments.  3.  Comfort measures, miscarriage precautions, p.o. hydration encouraged, Tylenol as needed for pain, if pain persists or gets worse patient was encouraged to go to the main ER for further assessment.  Patient could also consider following up with her PCP as an outpatient if  desired      I discussed the patients findings and my recommendations with patient, family, and nursing staff      Tiara Altamirano MD  3/31/2024  18:28 EDT

## 2024-04-02 ENCOUNTER — TELEPHONE (OUTPATIENT)
Dept: OBSTETRICS AND GYNECOLOGY | Age: 29
End: 2024-04-02
Payer: COMMERCIAL

## 2024-04-02 NOTE — TELEPHONE ENCOUNTER
----- Message from Davida Quinonez sent at 4/2/2024  3:32 PM EDT -----  Regarding: Left lower pain  Contact: [phone]  Hello, I started to get pain around my left pelvic area (ovaries maybe). It’s been everytime I’m walking or moving around. I’m not sure if this is normal. It doesn’t automatically going away when I lay down either.

## 2024-04-02 NOTE — TELEPHONE ENCOUNTER
----- Message from Davida Quinonez sent at 4/2/2024  3:32 PM EDT -----  Regarding: Left lower pain  Contact: [phone]  Hello, I started to get pain around my left pelvic area (ovaries maybe). It’s been everytime I’m walking or moving around. I’m not sure if this is normal. It doesn’t automatically going away when I lay down either.

## 2024-04-08 ENCOUNTER — TELEPHONE (OUTPATIENT)
Dept: OBSTETRICS AND GYNECOLOGY | Age: 29
End: 2024-04-08
Payer: COMMERCIAL

## 2024-04-08 NOTE — TELEPHONE ENCOUNTER
Please tell her to take Tylenol and if headache not improved report to labor and delivery for evaluation.

## 2024-04-08 NOTE — TELEPHONE ENCOUNTER
----- Message from Davida Quinonez sent at 4/8/2024  3:27 PM EDT -----  Regarding: Headache   Contact: [phone]  Hello, I have had a headache for 3 days but off and on. I just wanted to see if it is normal.

## 2024-04-08 NOTE — TELEPHONE ENCOUNTER
Patient says she is concerned about her headache but did not take try tylenol  ,can't check BP at home , says she doesn't have vision changes or seeing floaters . Reports good water intake .

## 2024-04-08 NOTE — TELEPHONE ENCOUNTER
----- Message from Davida Quinonez sent at 4/8/2024  3:27 PM EDT -----  Regarding: Headache   Contact: [phone]  Hello, I have had a headache for 3 days but off and on. I just wanted to see if it is normal.

## 2024-04-09 ENCOUNTER — HOSPITAL ENCOUNTER (EMERGENCY)
Facility: HOSPITAL | Age: 29
Discharge: HOME | End: 2024-04-09
Attending: OBSTETRICS & GYNECOLOGY | Admitting: OBSTETRICS & GYNECOLOGY
Payer: COMMERCIAL

## 2024-04-09 VITALS
OXYGEN SATURATION: 99 % | SYSTOLIC BLOOD PRESSURE: 134 MMHG | RESPIRATION RATE: 16 BRPM | DIASTOLIC BLOOD PRESSURE: 86 MMHG | TEMPERATURE: 98 F | HEART RATE: 109 BPM

## 2024-04-09 LAB
BACTERIA UR QL AUTO: ABNORMAL /HPF
BILIRUB UR QL STRIP: NEGATIVE
CLARITY UR: ABNORMAL
COLOR UR: YELLOW
GLUCOSE UR STRIP-MCNC: NEGATIVE MG/DL
HGB UR QL STRIP.AUTO: NEGATIVE
HYALINE CASTS UR QL AUTO: ABNORMAL /LPF
KETONES UR QL STRIP: ABNORMAL
LEUKOCYTE ESTERASE UR QL STRIP.AUTO: ABNORMAL
NITRITE UR QL STRIP: NEGATIVE
PH UR STRIP.AUTO: 6.5 [PH] (ref 5–8)
PROT UR QL STRIP: ABNORMAL
RBC # UR STRIP: ABNORMAL /HPF
REF LAB TEST METHOD: ABNORMAL
SP GR UR STRIP: 1.02 (ref 1–1.03)
SQUAMOUS #/AREA URNS HPF: ABNORMAL /HPF
UROBILINOGEN UR QL STRIP: ABNORMAL
WBC # UR STRIP: ABNORMAL /HPF

## 2024-04-09 PROCEDURE — 81001 URINALYSIS AUTO W/SCOPE: CPT | Performed by: OBSTETRICS & GYNECOLOGY

## 2024-04-09 PROCEDURE — 99283 EMERGENCY DEPT VISIT LOW MDM: CPT | Performed by: OBSTETRICS & GYNECOLOGY

## 2024-04-09 PROCEDURE — 87086 URINE CULTURE/COLONY COUNT: CPT | Performed by: OBSTETRICS & GYNECOLOGY

## 2024-04-09 RX ORDER — ACETAMINOPHEN 500 MG
500 TABLET ORAL EVERY 6 HOURS PRN
COMMUNITY

## 2024-04-09 NOTE — OBED NOTES
[facility]  Davida Quinonez  : 1995  MRN: [id number]  CSN: [id number]    OB ED Provider Note    Subjective   Chief Complaint   Patient presents with    Headache     Headache for 4 days, Took tylenol last night that took pain from 5/10 to 1/10, no vaginal bleeding or LOF     Davida Quinonez is a 29 y.o. year old  with an Estimated Date of Delivery: 24 currently at 23w4d presenting with 4 day history of HA of variable location and intensity, ranging from 1/10 to 5/10. The HA was frontal last night, however upon awakening today, it was located in the left temporal region. She denies photophobia or sound sensitivity. The HA was 5/10 upon awakening which dropped to 1/10 following tylenol. She denies CTX, ROM or VB. FM is present.     Prenatal care has been with Dr. Barrera.  It has been complicated by anemia, previous C/S - (mode of delivery not determined), and Rh negative, maternal obesity and sleep apnea .    OB History    Para Term  AB Living   4 1 1 0 1 1   SAB IAB Ectopic Molar Multiple Live Births   1 0 0 0 0 1      # Outcome Date GA Lbr Celestine/2nd Weight Sex Type Anes PTL Lv   4 Current            3 Term 21 38w3d  4080 g (8 lb 15.9 oz) M CS-LTranv Spinal N MIRIAM      Birth Comments: Alexei 1      Name: Manuelito       Apgar1: 8  Apgar5: 9   2 SAB 20 7w3d             Birth Comments: with D&C   1                Obstetric Comments   23 - LMP rejected - IUP at 6- 4 weeks - BRANDO 24 - [facility]    3/19/24 - 20-4 weeks -normal completed anatomy, normal cervical length.-[facility]      Past Medical History:   Diagnosis Date    Clinical diagnosis of COVID-19 2022    HEADACHE ONLY    Abnormal Pap smear of cervix     ADHD (attention deficit hyperactivity disorder)     never medicated    Anxiety     not medicated during pregnancy    Arthritis     Depression     not medicated during pregnancy    HPV in female     Impingement syndrome of left shoulder     Miscarriage      Obesity 2008    Sleep apnea     cpap occasionally     Past Surgical History:   Procedure Laterality Date    D & C WITH SUCTION N/A 2020    Procedure: DILATATION AND CURETTAGE WITH SUCTION;  Surgeon: Yadi Barrera MD;  Location: [facility] MAIN OR;  Service: Obstetrics/Gynecology;  Laterality: N/A;     SECTION N/A 2021    Procedure:  SECTION PRIMARY;  Surgeon: Yadi Barrera MD;  Location: [facility] LABOR DELIVERY;  Service: Obstetrics/Gynecology;  Laterality: N/A;    SHOULDER ARTHROSCOPY W/ SUPERIOR LABRAL ANTERIOR POSTERIOR REPAIR Left 2022    Procedure: LEFT SHOULDER ARTHROSCOPY LBARAL REPAIR WITH DISTAL CLAVICLE EXCISION AND SUBACROMIAL DECOMPRESSION;  Surgeon: Navid Hinds MD;  Location: [facility];  Service: Orthopedics;  Laterality: Left;    JOINT MANIPULATION Left 2022    Procedure: LEFT SHOULDER MANIPULATION;  Surgeon: Navid Hinds MD;  [place] [facility] [place] [facility] [place]  Service: Orthopedics;  Laterality: Left;    D & C WITH SUCTION N/A 2023    Procedure: DILATATION AND CURETTAGE WITH SUCTION;  Surgeon: Yadi Barrera MD;  Location: [facility] MAIN OR;  Service: Obstetrics/Gynecology;  Laterality: N/A;    APPENDECTOMY N/A 2023    Procedure: APPENDECTOMY LAPAROSCOPIC;  Surgeon: Brandt Lemus Jr., MD;  Location: [facility] OR;  Service: General;  Laterality: N/A;    ADENOIDECTOMY      EAR TUBES      LAPAROSCOPIC CHOLECYSTECTOMY      TONSILLECTOMY      WISDOM TOOTH EXTRACTION       No current facility-administered medications for this encounter.    No Known Allergies  Social History    Tobacco Use      Smoking status: Never      Smokeless tobacco: Never    Review of Systems   Neurological:  Positive for headaches.   All other systems reviewed and are negative.        Objective   /86   Pulse 109   Temp 98 °F (36.7 °C) (Oral)   Resp 16   LMP 10/19/2023 (Exact Date)   SpO2 99%   General: well developed; well nourished  no acute distress  "  Abdomen: soft, non-tender; no masses  gravid    FHT's: 155      Cervix: was not checked.   Presentation: transverse lie   Contractions: Not monitored   Chest: Unlabored respirations    CV:  Mild tachycardia, RR   Ext:   No C/C/E   Back: CVA tenderness is deferred bilateral        Prenatal Labs  Lab Results   Component Value Date    HGB 11.8 (L) 03/31/2024    RUBELLAABIGG 3.31 12/14/2023    HEPBSAG Negative 12/14/2023    ABSCRN Negative 12/14/2023    PPB8QMR8 Non Reactive 12/14/2023    HEPCVIRUSABY Non Reactive 12/14/2023     03/19/2024    STREPGPB Negative 03/22/2021    URINECX 50,000 CFU/mL Mixed Breanna Isolated 03/29/2024    CHLAMNAA Negative 12/14/2023    NGONORRHON Negative 12/14/2023       Current Labs Reviewed   UA:    Lab Results   Component Value Date    SQUAMEPIUA 7-12 (A) 04/09/2024    SPECGRAVUR 1.023 04/09/2024    KETONESU Trace (A) 04/09/2024    BLOODU Negative 04/09/2024    LEUKOCYTESUR Trace (A) 04/09/2024    NITRITEU Negative 04/09/2024    RBCUA 0-2 04/09/2024    WBCUA 6-10 (A) 04/09/2024    BACTERIA 1+ (A) 04/09/2024          Assessment   IUP at 23w4d  HA- given the protean nature of her HA, multiple contributing factors may be at play, including congestion from pollen counts, thickened mucus from dehydration, as well as hormonal effects of pregnancy. I explained to Ms. Quinonez that if MENSAH improves with tylenol, significant pathology is unlikely. I encouraged the use of OTC allergy medications and continued tylenol, however she states that she does not want to take medications. I also encouraged increasing the humidity settings on her CPAP at which time she explained that her CPAP usage is inconsistent because \"I don't like using it\".     Plan   D/C home with instructions to increase po fluid intake. CPAP usage is not optional, as maternal hypoxia means fetal hypoxia. Return for worsening symptoms or acute changes. Keep regularly scheduled prenatal appointments.     Nacho Solano, " MD  4/9/2024  07:52 EDT

## 2024-04-10 LAB — BACTERIA SPEC AEROBE CULT: NORMAL

## 2024-04-16 ENCOUNTER — ROUTINE PRENATAL (OUTPATIENT)
Dept: OBSTETRICS AND GYNECOLOGY | Age: 29
End: 2024-04-16
Payer: COMMERCIAL

## 2024-04-16 VITALS — WEIGHT: 293 LBS | DIASTOLIC BLOOD PRESSURE: 84 MMHG | BODY MASS INDEX: 47.7 KG/M2 | SYSTOLIC BLOOD PRESSURE: 120 MMHG

## 2024-04-16 DIAGNOSIS — Z30.2 REQUEST FOR STERILIZATION: ICD-10-CM

## 2024-04-16 DIAGNOSIS — O26.892 RH NEGATIVE STATUS DURING PREGNANCY IN SECOND TRIMESTER: ICD-10-CM

## 2024-04-16 DIAGNOSIS — E28.2 PCOS (POLYCYSTIC OVARIAN SYNDROME): ICD-10-CM

## 2024-04-16 DIAGNOSIS — Z13.89 SCREENING FOR BLOOD OR PROTEIN IN URINE: ICD-10-CM

## 2024-04-16 DIAGNOSIS — Z67.91 RH NEGATIVE STATUS DURING PREGNANCY IN SECOND TRIMESTER: ICD-10-CM

## 2024-04-16 DIAGNOSIS — R73.09 ELEVATED HEMOGLOBIN A1C: ICD-10-CM

## 2024-04-16 DIAGNOSIS — O99.210 OBESITY IN PREGNANCY, ANTEPARTUM: ICD-10-CM

## 2024-04-16 DIAGNOSIS — Z34.82 PRENATAL CARE, SUBSEQUENT PREGNANCY IN SECOND TRIMESTER: Primary | ICD-10-CM

## 2024-04-16 DIAGNOSIS — O99.019 MATERNAL ANEMIA IN PREGNANCY, ANTEPARTUM: ICD-10-CM

## 2024-04-16 DIAGNOSIS — Q51.3 BICORNUATE UTERUS: ICD-10-CM

## 2024-04-16 DIAGNOSIS — O34.219 PREVIOUS CESAREAN DELIVERY AFFECTING PREGNANCY: ICD-10-CM

## 2024-04-16 PROBLEM — Z87.59 HISTORY OF GESTATIONAL HYPERTENSION: Status: ACTIVE | Noted: 2024-04-16

## 2024-04-16 LAB
BILIRUB BLD-MCNC: NEGATIVE MG/DL
GLUCOSE UR STRIP-MCNC: NEGATIVE MG/DL
KETONES UR QL: NEGATIVE
LEUKOCYTE EST, POC: NEGATIVE
NITRITE UR-MCNC: NEGATIVE MG/ML
PH UR: 5.5 [PH] (ref 5–8)
PROT UR STRIP-MCNC: ABNORMAL MG/DL
RBC # UR STRIP: NEGATIVE /UL
SP GR UR: 1.03 (ref 1–1.03)
UROBILINOGEN UR QL: ABNORMAL

## 2024-04-16 PROCEDURE — 0502F SUBSEQUENT PRENATAL CARE: CPT | Performed by: OBSTETRICS & GYNECOLOGY

## 2024-04-16 RX ORDER — FERROUS SULFATE 325(65) MG
325 TABLET ORAL
Qty: 90 TABLET | Refills: 3 | Status: SHIPPED | OUTPATIENT
Start: 2024-04-16

## 2024-04-16 NOTE — PROGRESS NOTES
Chief Complaint   Patient presents with    Routine Prenatal Visit     Cc: ob check with growth Us , feels sore in the pelvic area , denies any vaginal bleeding or cramping        HPI: 29 y.o.  at 24w4d presents  for prenatal care    Last OB US growth         Value Time User    EFW%ILE  70%ile 2024  2:02 PM Yadi Barrera MD    AC%ILE  96%ile 2024  2:02 PM Yadi Barrera MD    FETAL SURVEY  NL/Complete 3/19/2024  9:05 AM Yadi Barrera MD          Vitals:    24 1357   BP: 120/84   Weight: (!) 147 kg (323 lb)     Total weight gain for pregnancy:  10.4 kg (23 lb)    Review of systems:   Gen: negative  CV:     negative  GI: negative  :   good fetal movement noted  and pelvic pressure  MS:    negative  Neuro: negative  Pul: negative    A/P  1. Intrauterine pregnancy at 24w4d   2. Pregnancy Risk:  HIGH RISK        Diagnoses and all orders for this visit:    1. Screening for blood or protein in urine (Primary)  -     POC Urinalysis Dipstick    2. Elevated hemoglobin A1c    3. PCOS (polycystic ovarian syndrome)    4. Bicornuate uterus  -     Case Request    5. Obesity in pregnancy, antepartum  -     Case Request    6. Previous  delivery affecting pregnancy  -     Case Request    7. Rh negative status during pregnancy in second trimester  -     Case Request    8. Maternal anemia in pregnancy, antepartum  -     ferrous sulfate 325 (65 FE) MG tablet; Take 1 tablet by mouth Daily With Breakfast.  Dispense: 90 tablet; Refill: 3  -     Case Request    9. Request for sterilization  -     Case Request         labor was discussed.  Warnings were provided.  Nutrition and weight gain were addressed.  -----------------------  PLAN:   Return in about 3 weeks (around 2024), or ob check/one-hour gtt and growth US.  Repeat section and tubal scheduled  Sign tubal papers today  Rh-- RhoGAM at 28 weeks       Yadi Barrera MD  2024 14:09 EDT

## 2024-04-18 NOTE — TELEPHONE ENCOUNTER
Mom called and is wondering if maybe she needs to go to ortho or sports meds and see if injury to shoulder since she is having a very hard time moving   
did not say

## 2024-05-07 ENCOUNTER — ROUTINE PRENATAL (OUTPATIENT)
Dept: OBSTETRICS AND GYNECOLOGY | Age: 29
End: 2024-05-07
Payer: COMMERCIAL

## 2024-05-07 VITALS — WEIGHT: 293 LBS | BODY MASS INDEX: 48.73 KG/M2 | SYSTOLIC BLOOD PRESSURE: 120 MMHG | DIASTOLIC BLOOD PRESSURE: 80 MMHG

## 2024-05-07 DIAGNOSIS — O26.892 RH NEGATIVE STATUS DURING PREGNANCY IN SECOND TRIMESTER: ICD-10-CM

## 2024-05-07 DIAGNOSIS — O99.210 OBESITY IN PREGNANCY, ANTEPARTUM: ICD-10-CM

## 2024-05-07 DIAGNOSIS — Z13.0 SCREENING FOR IRON DEFICIENCY ANEMIA: ICD-10-CM

## 2024-05-07 DIAGNOSIS — Z87.59 HISTORY OF GESTATIONAL HYPERTENSION: ICD-10-CM

## 2024-05-07 DIAGNOSIS — O34.219 PREVIOUS CESAREAN DELIVERY AFFECTING PREGNANCY: ICD-10-CM

## 2024-05-07 DIAGNOSIS — O99.019 MATERNAL ANEMIA IN PREGNANCY, ANTEPARTUM: ICD-10-CM

## 2024-05-07 DIAGNOSIS — R73.09 ELEVATED HEMOGLOBIN A1C: ICD-10-CM

## 2024-05-07 DIAGNOSIS — Z13.89 SCREENING FOR BLOOD OR PROTEIN IN URINE: ICD-10-CM

## 2024-05-07 DIAGNOSIS — Z3A.27 27 WEEKS GESTATION OF PREGNANCY: ICD-10-CM

## 2024-05-07 DIAGNOSIS — Z13.1 SCREENING FOR DIABETES MELLITUS: ICD-10-CM

## 2024-05-07 DIAGNOSIS — Z34.82 PRENATAL CARE, SUBSEQUENT PREGNANCY IN SECOND TRIMESTER: Primary | ICD-10-CM

## 2024-05-07 DIAGNOSIS — Z30.2 REQUEST FOR STERILIZATION: ICD-10-CM

## 2024-05-07 DIAGNOSIS — Z23 ENCOUNTER FOR ADMINISTRATION OF VACCINE: ICD-10-CM

## 2024-05-07 DIAGNOSIS — Q51.3 BICORNUATE UTERUS: ICD-10-CM

## 2024-05-07 DIAGNOSIS — Z67.91 RH NEGATIVE STATUS DURING PREGNANCY IN SECOND TRIMESTER: ICD-10-CM

## 2024-05-07 PROBLEM — O36.60X0 LGA (LARGE FOR GESTATIONAL AGE) FETUS AFFECTING MANAGEMENT OF MOTHER: Status: ACTIVE | Noted: 2024-05-07

## 2024-05-07 LAB
BILIRUB BLD-MCNC: NEGATIVE MG/DL
GLUCOSE UR STRIP-MCNC: NEGATIVE MG/DL
KETONES UR QL: ABNORMAL
LEUKOCYTE EST, POC: NEGATIVE
NITRITE UR-MCNC: NEGATIVE MG/ML
PH UR: 7 [PH] (ref 5–8)
PROT UR STRIP-MCNC: NEGATIVE MG/DL
RBC # UR STRIP: NEGATIVE /UL
SP GR UR: 1.02 (ref 1–1.03)
UROBILINOGEN UR QL: NORMAL

## 2024-05-08 LAB
BLD GP AB SCN SERPL QL: NEGATIVE
ERYTHROCYTE [DISTWIDTH] IN BLOOD BY AUTOMATED COUNT: 12.8 % (ref 11.7–15.4)
GLUCOSE 1H P 50 G GLC PO SERPL-MCNC: 113 MG/DL (ref 70–139)
HCT VFR BLD AUTO: 36 % (ref 34–46.6)
HGB BLD-MCNC: 11.5 G/DL (ref 11.1–15.9)
MCH RBC QN AUTO: 28.3 PG (ref 26.6–33)
MCHC RBC AUTO-ENTMCNC: 31.9 G/DL (ref 31.5–35.7)
MCV RBC AUTO: 89 FL (ref 79–97)
PLATELET # BLD AUTO: 273 X10E3/UL (ref 150–450)
RBC # BLD AUTO: 4.06 X10E6/UL (ref 3.77–5.28)
RPR SER QL: NON REACTIVE
WBC # BLD AUTO: 11.9 X10E3/UL (ref 3.4–10.8)

## 2024-05-09 ENCOUNTER — TELEPHONE (OUTPATIENT)
Dept: OBSTETRICS AND GYNECOLOGY | Age: 29
End: 2024-05-09
Payer: COMMERCIAL

## 2024-05-14 ENCOUNTER — TELEPHONE (OUTPATIENT)
Dept: OBSTETRICS AND GYNECOLOGY | Age: 29
End: 2024-05-14
Payer: COMMERCIAL

## 2024-05-14 NOTE — TELEPHONE ENCOUNTER
PT 28W 4D states that she has stabbing pain under her bra line between her rib cage. It is not hurting as bad as it was last night. When she pushes on it its the same feeling.

## 2024-05-14 NOTE — TELEPHONE ENCOUNTER
Please let her know that this is likely some inflammation around the ribs from the baby kicking.  If contractions, loss of fluid, vaginal bleeding, or decreased fetal movement please go into labor and delivery.  If abdominal pain becomes severe please report to the hospital.

## 2024-05-14 NOTE — TELEPHONE ENCOUNTER
PT was advised of Dr Barrera message. PT said its not a continuous pain its just every now and them but will continue to monitor

## 2024-05-21 ENCOUNTER — ROUTINE PRENATAL (OUTPATIENT)
Dept: OBSTETRICS AND GYNECOLOGY | Age: 29
End: 2024-05-21
Payer: COMMERCIAL

## 2024-05-21 VITALS — DIASTOLIC BLOOD PRESSURE: 72 MMHG | BODY MASS INDEX: 49.32 KG/M2 | SYSTOLIC BLOOD PRESSURE: 112 MMHG | WEIGHT: 293 LBS

## 2024-05-21 DIAGNOSIS — Q51.3 BICORNUATE UTERUS: ICD-10-CM

## 2024-05-21 DIAGNOSIS — O99.019 MATERNAL ANEMIA IN PREGNANCY, ANTEPARTUM: ICD-10-CM

## 2024-05-21 DIAGNOSIS — O36.63X0 EXCESSIVE FETAL GROWTH AFFECTING MANAGEMENT OF PREGNANCY IN THIRD TRIMESTER, SINGLE OR UNSPECIFIED FETUS: ICD-10-CM

## 2024-05-21 DIAGNOSIS — Z67.91 RH NEGATIVE STATUS DURING PREGNANCY IN THIRD TRIMESTER: ICD-10-CM

## 2024-05-21 DIAGNOSIS — O99.210 OBESITY IN PREGNANCY, ANTEPARTUM: ICD-10-CM

## 2024-05-21 DIAGNOSIS — O34.219 PREVIOUS CESAREAN DELIVERY AFFECTING PREGNANCY: ICD-10-CM

## 2024-05-21 DIAGNOSIS — Z87.59 HISTORY OF GESTATIONAL HYPERTENSION: ICD-10-CM

## 2024-05-21 DIAGNOSIS — Z13.89 SCREENING FOR BLOOD OR PROTEIN IN URINE: Primary | ICD-10-CM

## 2024-05-21 DIAGNOSIS — Z34.83 PRENATAL CARE, SUBSEQUENT PREGNANCY IN THIRD TRIMESTER: ICD-10-CM

## 2024-05-21 DIAGNOSIS — O26.893 RH NEGATIVE STATUS DURING PREGNANCY IN THIRD TRIMESTER: ICD-10-CM

## 2024-05-21 DIAGNOSIS — E28.2 PCOS (POLYCYSTIC OVARIAN SYNDROME): ICD-10-CM

## 2024-05-21 DIAGNOSIS — R73.09 ELEVATED HEMOGLOBIN A1C: ICD-10-CM

## 2024-05-21 DIAGNOSIS — Z30.2 REQUEST FOR STERILIZATION: ICD-10-CM

## 2024-05-21 LAB
BILIRUB BLD-MCNC: NEGATIVE MG/DL
GLUCOSE UR STRIP-MCNC: NEGATIVE MG/DL
KETONES UR QL: NEGATIVE
LEUKOCYTE EST, POC: NEGATIVE
NITRITE UR-MCNC: NEGATIVE MG/ML
PH UR: 5.5 [PH] (ref 5–8)
PROT UR STRIP-MCNC: NEGATIVE MG/DL
RBC # UR STRIP: NEGATIVE /UL
SP GR UR: 1.01 (ref 1–1.03)
UROBILINOGEN UR QL: NORMAL

## 2024-05-21 NOTE — PROGRESS NOTES
Chief Complaint   Patient presents with    Routine Prenatal Visit     Cc: ob check , no ob complaints today , still having some soreness in the pelvic area but no bleeding        HPI: 29 y.o.  at 29w4d presents for prenatal care    Last OB US growth (since 2024)         Value Time User    EFW%ILE  86%ile 2024  9:42 AM Yadi Barrera MD    AC%ILE  >97%ile 2024  9:42 AM Yadi Barrera MD    FETAL SURVEY  NL/Complete 3/19/2024  9:05 AM Yadi Barrera MD          Vitals:    24 1309   BP: 112/72   Weight: (!) 152 kg (334 lb)     Total weight gain for pregnancy:  15.4 kg (34 lb)    Review of systems:   Gen: negative  CV:     negative  GI: negative  :   good fetal movement noted  and pelvic pressure  MS:    negative  Neuro: negative  Pul: negative    Physical Exam  Vitals and nursing note reviewed.   Constitutional:       Appearance: Normal appearance. She is obese.   Pulmonary:      Effort: Pulmonary effort is normal.   Neurological:      Mental Status: She is alert.   Psychiatric:         Mood and Affect: Mood normal.         Thought Content: Thought content normal.         Judgment: Judgment normal.           A/P  1. Intrauterine pregnancy at 29w4d   2. Pregnancy Risk:  HIGH RISK    Diagnoses and all orders for this visit:    1. Screening for blood or protein in urine (Primary)  -     POC Urinalysis Dipstick    2. Elevated hemoglobin A1c    3. PCOS (polycystic ovarian syndrome)    4. Maternal anemia in pregnancy, antepartum    5. Rh negative status during pregnancy in third trimester    6. Previous  delivery affecting pregnancy    7. Obesity in pregnancy, antepartum    8. Excessive fetal growth affecting management of pregnancy in third trimester, single or unspecified fetus    9. Request for sterilization    10. Bicornuate uterus    11. History of gestational hypertension        Nutrition and weight gain were addressed.  -----------------------  PLAN:   Return in  about 2 weeks (around 6/4/2024), or ob check and growth US.  RH negative-status post RhoGAM  Anemia continue iron-  Repeat section and tubal scheduled at 39 weeks  Morbid obesity-check growth next visit    Yadi Barrera MD  5/21/2024 13:33 EDT

## 2024-06-04 ENCOUNTER — ROUTINE PRENATAL (OUTPATIENT)
Dept: OBSTETRICS AND GYNECOLOGY | Age: 29
End: 2024-06-04
Payer: COMMERCIAL

## 2024-06-04 VITALS — DIASTOLIC BLOOD PRESSURE: 84 MMHG | WEIGHT: 293 LBS | SYSTOLIC BLOOD PRESSURE: 120 MMHG | BODY MASS INDEX: 49.32 KG/M2

## 2024-06-04 DIAGNOSIS — Z13.89 SCREENING FOR BLOOD OR PROTEIN IN URINE: ICD-10-CM

## 2024-06-04 DIAGNOSIS — R73.09 ELEVATED HEMOGLOBIN A1C: ICD-10-CM

## 2024-06-04 DIAGNOSIS — Z30.2 REQUEST FOR STERILIZATION: ICD-10-CM

## 2024-06-04 DIAGNOSIS — E28.2 PCOS (POLYCYSTIC OVARIAN SYNDROME): ICD-10-CM

## 2024-06-04 DIAGNOSIS — O99.210 OBESITY IN PREGNANCY, ANTEPARTUM: ICD-10-CM

## 2024-06-04 DIAGNOSIS — Q51.3 BICORNUATE UTERUS: ICD-10-CM

## 2024-06-04 DIAGNOSIS — O34.219 PREVIOUS CESAREAN DELIVERY AFFECTING PREGNANCY: ICD-10-CM

## 2024-06-04 DIAGNOSIS — Z67.91 RH NEGATIVE STATUS DURING PREGNANCY IN THIRD TRIMESTER: ICD-10-CM

## 2024-06-04 DIAGNOSIS — Z34.83 PRENATAL CARE, SUBSEQUENT PREGNANCY IN THIRD TRIMESTER: Primary | ICD-10-CM

## 2024-06-04 DIAGNOSIS — O99.019 MATERNAL ANEMIA IN PREGNANCY, ANTEPARTUM: ICD-10-CM

## 2024-06-04 DIAGNOSIS — Z87.59 HISTORY OF GESTATIONAL HYPERTENSION: ICD-10-CM

## 2024-06-04 DIAGNOSIS — O26.893 RH NEGATIVE STATUS DURING PREGNANCY IN THIRD TRIMESTER: ICD-10-CM

## 2024-06-04 DIAGNOSIS — O36.63X0 EXCESSIVE FETAL GROWTH AFFECTING MANAGEMENT OF PREGNANCY IN THIRD TRIMESTER, SINGLE OR UNSPECIFIED FETUS: ICD-10-CM

## 2024-06-04 LAB
BILIRUB BLD-MCNC: NEGATIVE MG/DL
GLUCOSE UR STRIP-MCNC: NEGATIVE MG/DL
KETONES UR QL: ABNORMAL
LEUKOCYTE EST, POC: NEGATIVE
NITRITE UR-MCNC: NEGATIVE MG/ML
PH UR: 6 [PH] (ref 5–8)
PROT UR STRIP-MCNC: ABNORMAL MG/DL
RBC # UR STRIP: NEGATIVE /UL
SP GR UR: 1.02 (ref 1–1.03)
UROBILINOGEN UR QL: NORMAL

## 2024-06-04 PROCEDURE — 0502F SUBSEQUENT PRENATAL CARE: CPT | Performed by: OBSTETRICS & GYNECOLOGY

## 2024-06-04 NOTE — PROGRESS NOTES
Chief Complaint   Patient presents with    Routine Prenatal Visit     Cc ob check with BPP , had an episode of brown spotting sometime week ago or so ,  she believes  she had painful ctx yesterday , she experiencing some stubbing pains upper middle area close by her stomach , after pains go away she feels sore in that area .does not have a heartburn .        HPI: 29 y.o.  at 31w4d presents for prenatal care    Last OB US growth (since 2024)         Value Time User    EFW%ILE  >90%ile 2024 11:27 AM Yaid Barrera MD    AC%ILE  >97%ile 2024 11:27 AM Yadi Barrera MD    FETAL SURVEY  NL/Complete 3/19/2024  9:05 AM Yadi Barrera MD          Vitals:    24 1119   BP: 120/84   Weight: (!) 152 kg (334 lb)     Total weight gain for pregnancy:  15.4 kg (34 lb)    Review of systems:   Gen: negative  CV:     negative  GI: negative  :   good fetal movement noted  and humble brownlee type contractions  MS:    negative  Neuro: negative  Pul: negative    Physical Exam  Vitals and nursing note reviewed.   Constitutional:       Appearance: Normal appearance. She is obese.   Pulmonary:      Effort: Pulmonary effort is normal.   Neurological:      Mental Status: She is alert.   Psychiatric:         Mood and Affect: Mood normal.         Thought Content: Thought content normal.         Judgment: Judgment normal.           A/P2  1. Intrauterine pregnancy at 31w4d   2. Pregnancy Risk:  HIGH RISK    Diagnoses and all orders for this visit:    1. Prenatal care, subsequent pregnancy in third trimester (Primary)    2. Screening for blood or protein in urine  -     POC Urinalysis Dipstick    3. Elevated hemoglobin A1c  -     Comprehensive Metabolic Panel  -     Hemoglobin A1c    4. PCOS (polycystic ovarian syndrome)    5. Bicornuate uterus    6. Request for sterilization    7. History of gestational hypertension    8. Excessive fetal growth affecting management of pregnancy in third trimester,  single or unspecified fetus    9. Maternal anemia in pregnancy, antepartum  -     CBC (No Diff)    10. Rh negative status during pregnancy in third trimester    11. Previous  delivery affecting pregnancy    12. Obesity in pregnancy, antepartum        Pre-eclampsia symptoms were discussed and warnings were given.   labor was discussed.  Warnings were provided.  Nutrition and weight gain were addressed.  -----------------------  PLAN:   Return in about 2 weeks (around 2024), or ob check and BPP.  LGA-recheck hemoglobin A1c  Serial growth  Anemia-check hemoglobin today  Epigastric pain-CMP   labor warnings given    Yadi Barrera MD  2024 11:32 EDT

## 2024-06-05 LAB
ALBUMIN SERPL-MCNC: 3.8 G/DL (ref 4–5)
ALBUMIN/GLOB SERPL: 1.8 {RATIO} (ref 1.2–2.2)
ALP SERPL-CCNC: 126 IU/L (ref 44–121)
ALT SERPL-CCNC: 17 IU/L (ref 0–32)
AST SERPL-CCNC: 17 IU/L (ref 0–40)
BILIRUB SERPL-MCNC: 0.2 MG/DL (ref 0–1.2)
BUN SERPL-MCNC: 5 MG/DL (ref 6–20)
BUN/CREAT SERPL: 10 (ref 9–23)
CALCIUM SERPL-MCNC: 9.1 MG/DL (ref 8.7–10.2)
CHLORIDE SERPL-SCNC: 104 MMOL/L (ref 96–106)
CO2 SERPL-SCNC: 16 MMOL/L (ref 20–29)
CREAT SERPL-MCNC: 0.48 MG/DL (ref 0.57–1)
EGFRCR SERPLBLD CKD-EPI 2021: 131 ML/MIN/1.73
ERYTHROCYTE [DISTWIDTH] IN BLOOD BY AUTOMATED COUNT: 13 % (ref 11.7–15.4)
GLOBULIN SER CALC-MCNC: 2.1 G/DL (ref 1.5–4.5)
GLUCOSE SERPL-MCNC: 120 MG/DL (ref 70–99)
HBA1C MFR BLD: 5.7 % (ref 4.8–5.6)
HCT VFR BLD AUTO: 33.8 % (ref 34–46.6)
HGB BLD-MCNC: 10.9 G/DL (ref 11.1–15.9)
MCH RBC QN AUTO: 28.2 PG (ref 26.6–33)
MCHC RBC AUTO-ENTMCNC: 32.2 G/DL (ref 31.5–35.7)
MCV RBC AUTO: 88 FL (ref 79–97)
PLATELET # BLD AUTO: 279 X10E3/UL (ref 150–450)
POTASSIUM SERPL-SCNC: 3.7 MMOL/L (ref 3.5–5.2)
PROT SERPL-MCNC: 5.9 G/DL (ref 6–8.5)
RBC # BLD AUTO: 3.86 X10E6/UL (ref 3.77–5.28)
SODIUM SERPL-SCNC: 136 MMOL/L (ref 134–144)
WBC # BLD AUTO: 9.7 X10E3/UL (ref 3.4–10.8)

## 2024-06-05 NOTE — PROGRESS NOTES
Call patient and notify of mild anemia on labs-continue oral iron.  Hemoglobin A1c is actually improved.  Is she taking 2 metformin per day?

## 2024-06-05 NOTE — PROGRESS NOTES
Results to patient  mild anemia on labs-continue oral iron.  Hemoglobin A1c is actually improved. Patient takes 2 metformins a day .

## 2024-06-10 ENCOUNTER — OFFICE VISIT (OUTPATIENT)
Dept: SLEEP MEDICINE | Facility: HOSPITAL | Age: 29
End: 2024-06-10
Payer: COMMERCIAL

## 2024-06-10 VITALS
BODY MASS INDEX: 43.4 KG/M2 | OXYGEN SATURATION: 97 % | HEIGHT: 69 IN | HEART RATE: 120 BPM | SYSTOLIC BLOOD PRESSURE: 134 MMHG | DIASTOLIC BLOOD PRESSURE: 77 MMHG | WEIGHT: 293 LBS

## 2024-06-10 DIAGNOSIS — G47.33 OSA ON CPAP: Primary | ICD-10-CM

## 2024-06-10 PROCEDURE — G0463 HOSPITAL OUTPT CLINIC VISIT: HCPCS

## 2024-06-10 NOTE — PROGRESS NOTES
"[facility] PULMONARY CARE         Dr Joanna Marsh  [unfilled]  Patient Care Team:  Laurie Wheatley APRN as PCP - General (Family Medicine)  Barrera, Yadi Hurtado MD as Obstetrician (Obstetrics and Gynecology)    Chief Complaint:Mild SANDOVAL apnea index 5 events per hour  Sleep-related hypoxemia with some artifacts noted on oxygen saturation monitor possibly not accurate.  Minimal snoring noted    Interval History: Follow-up for compliance visit.  Currently set up on auto CPAP 8 to 14 cm.  Compliance more than 4 hours is 37%.  Total usage days 77%.  Her AHI and leak within normal limits.  She tells me in the middle of the night she takes the mask off.  Goes to bed 9 gets up 630 gets about 9+ hours of sleep and feels tired in the morning.  No tobacco no alcohol no caffeine abuse.  Currently has a nasal mask that fits well.  Supplies been adequate.  [place] 3 out of 24 within normal limits.    REVIEW OF SYSTEMS:   CARDIOVASCULAR: No chest pain, chest pressure or chest discomfort. No palpitations or edema.   RESPIRATORY: No shortness of breath, cough or sputum.   GASTROINTESTINAL: No anorexia, nausea, vomiting or diarrhea. No abdominal pain or blood.   HEMATOLOGIC: No bleeding or bruising.     Ventilator/Non-Invasive Ventilation Settings (From admission, onward)      None              Vital Signs  Heart Rate:  [120] 120  BP: (134)/(77) 134/77  [unfilled]  Flowsheet Rows      Flowsheet Row First Filed Value   Admission Height 175.3 cm (69\") Documented at 06/10/2024 0900   Admission Weight 152 kg (334 lb 3.2 oz) Documented at 06/10/2024 0900            Physical Exam:  Patient is examined using the personal protective equipment as per guidelines from infection control for this particular patient as enacted.  Hand hygiene was performed before and after patient interaction.   General Appearance:    Alert, cooperative, in no acute distress.  Following simple commands  ENT Mallampati between 3 and 4 no nasal " congestion  Neck midline trachea, no thyromegaly   Lungs:     Clear to auscultation, respirations regular, even and                  unlabored    Heart:    Regular rhythm and normal rate, normal S1 and S2, no            murmur, no gallop, no rub, no click   Chest Wall:    No abnormalities observed   Abdomen:     Normal bowel sounds, no masses, no organomegaly, soft        nontender, nondistended, no guarding, no rebound                tenderness   Extremities:   Moves all extremities well, no edema, no cyanosis, no             redness  CNS no focal neurological deficits normal sensory exam  Skin no rashes no nodules  Musculoskeletal no cyanosis no clubbing normal range of motion     Results Review:        Results from last 7 days   Lab Units 06/04/24  1137   SODIUM mmol/L 136   POTASSIUM mmol/L 3.7   CHLORIDE mmol/L 104   CO2 mmol/L 16*   BUN mg/dL 5*   CREATININE mg/dL 0.48*   GLUCOSE mg/dL 120*   CALCIUM mg/dL 9.1         Results from last 7 days   Lab Units 06/04/24  1137   WBC x10E3/uL 9.7   HEMOGLOBIN g/dL 10.9*   HEMATOCRIT % 33.8*   PLATELETS x10E3/uL 279                           I reviewed the patient's new clinical results.  I personally viewed and interpreted the patient's chest x-ray.        Medication Review:       No current facility-administered medications for this visit.      ASSESSMENT:   SANDOVAL on CPAP  Obesity  Pregnancy    PLAN:  Reviewed compliance download with the patient  Compliance still remains below par specially and the fact that she is pregnant.  I discussed the ill effects of poor compliance on pregnancy she understands.  I will go ahead lower her auto CPAP to 5 to 12 cm.  I am hoping that her compliance usage of more than 4 hours will improve  Sleep hygiene measures  Treatment of underlying comorbidities  Correlation between SANDOVAL and pregnancy was also discussed in detail  We will follow-up in 3 months to review download      Joanna Marsh MD  06/10/24  09:50 EDT

## 2024-06-11 ENCOUNTER — HOSPITAL ENCOUNTER (OUTPATIENT)
Facility: HOSPITAL | Age: 29
Discharge: HOME | End: 2024-06-12
Attending: OBSTETRICS & GYNECOLOGY | Admitting: OBSTETRICS & GYNECOLOGY
Payer: COMMERCIAL

## 2024-06-11 VITALS
RESPIRATION RATE: 17 BRPM | HEART RATE: 74 BPM | HEIGHT: 69 IN | OXYGEN SATURATION: 98 % | DIASTOLIC BLOOD PRESSURE: 77 MMHG | SYSTOLIC BLOOD PRESSURE: 132 MMHG | WEIGHT: 293 LBS | BODY MASS INDEX: 43.4 KG/M2 | TEMPERATURE: 98.3 F

## 2024-06-11 PROCEDURE — 99284 EMERGENCY DEPT VISIT MOD MDM: CPT | Performed by: OBSTETRICS & GYNECOLOGY

## 2024-06-11 PROCEDURE — 84112 EVAL AMNIOTIC FLUID PROTEIN: CPT | Performed by: OBSTETRICS & GYNECOLOGY

## 2024-06-11 PROCEDURE — 81001 URINALYSIS AUTO W/SCOPE: CPT | Performed by: OBSTETRICS & GYNECOLOGY

## 2024-06-11 PROCEDURE — 25810000003 LACTATED RINGERS PER 1000 ML: Performed by: OBSTETRICS & GYNECOLOGY

## 2024-06-11 RX ORDER — SODIUM CHLORIDE, SODIUM LACTATE, POTASSIUM CHLORIDE, CALCIUM CHLORIDE 600; 310; 30; 20 MG/100ML; MG/100ML; MG/100ML; MG/100ML
999 INJECTION, SOLUTION INTRAVENOUS CONTINUOUS
Status: DISCONTINUED | OUTPATIENT
Start: 2024-06-12 | End: 2024-06-12 | Stop reason: HOSPADM

## 2024-06-11 RX ADMIN — SODIUM CHLORIDE, POTASSIUM CHLORIDE, SODIUM LACTATE AND CALCIUM CHLORIDE 999 ML/HR: 600; 310; 30; 20 INJECTION, SOLUTION INTRAVENOUS at 23:58

## 2024-06-12 LAB
A1 MICROGLOB PLACENTAL VAG QL: NEGATIVE
BACTERIA UR QL AUTO: ABNORMAL /HPF
BILIRUB UR QL STRIP: NEGATIVE
CLARITY UR: ABNORMAL
COD CRY URNS QL: ABNORMAL /HPF
COLOR UR: ABNORMAL
GLUCOSE UR STRIP-MCNC: NEGATIVE MG/DL
HGB UR QL STRIP.AUTO: NEGATIVE
HYALINE CASTS UR QL AUTO: ABNORMAL /LPF
KETONES UR QL STRIP: ABNORMAL
LEUKOCYTE ESTERASE UR QL STRIP.AUTO: NEGATIVE
MUCOUS THREADS URNS QL MICRO: ABNORMAL /HPF
NITRITE UR QL STRIP: NEGATIVE
PH UR STRIP.AUTO: 6 [PH] (ref 5–8)
PROT UR QL STRIP: ABNORMAL
RBC # UR STRIP: ABNORMAL /HPF
REF LAB TEST METHOD: ABNORMAL
SP GR UR STRIP: 1.02 (ref 1–1.03)
SQUAMOUS #/AREA URNS HPF: ABNORMAL /HPF
UROBILINOGEN UR QL STRIP: ABNORMAL
WBC # UR STRIP: ABNORMAL /HPF

## 2024-06-12 PROCEDURE — 59025 FETAL NON-STRESS TEST: CPT

## 2024-06-12 NOTE — OBED NOTES
[facility] [place] [facility] [place]    Patient Name: Davida Quinonez  YOB: 1995  MRN: [id number]  Admission Date: 2024 10:36 PM  Date of Service: 2024    Chief Complaint: Vomiting and cramping, loss of fluid        Subjective     Davida Quinonez is a 29 y.o. female  at 32w4d with Estimated Date of Delivery: 24 who presents with the chief complaint listed above.  Last night patient had an episode of vomiting up to 14 times.  During 1 of these events of vomiting patient had a loss of fluid and she is not sure if it came from her bladder or her vagina.  She reports that she still having some trickling fluid down below.  She denies really having nausea before and in fact says she has not been nauseated the whole pregnancy so this was peculiar.  She denies diarrhea, fever, or sick contacts.  She is not sure if she might of eaten something that disagreed with her.  Patient reports that she is tolerating fluids and during this interview she was able to sip fluids from home.  Patient reports that she thinks she is dehydrated and she has been cramping as well.  Patient has no history of  labor.     She sees Yadi Barrera MD for her prenatal care. Her pregnancy has been complicated by: Morbid obesity, prior , anemia, elevated hemoglobin A1c, Rh-, PCOS, bicornate uterus, anxiety and depression, sleep apnea, arthritis    She describes fetal movement as normal.  She is not sure if rupture of membranes.  She denies vaginal bleeding. She is not feeling contractions.        Objective   Patient Active Problem List    Diagnosis     *Maternal anemia in pregnancy, antepartum [O99.019]     LGA (large for gestational age) fetus affecting management of mother [O36.60X0]     Request for sterilization [Z30.2]     History of gestational hypertension [Z87.59]     Elevated hemoglobin A1c [R73.09]     Obesity in pregnancy, antepartum [O99.210]     Previous  delivery affecting pregnancy  [O34.219]     Rh negative status during pregnancy [O26.899, Z67.91]     PCOS (polycystic ovarian syndrome) [E28.2]     Bicornuate uterus [Q51.3]         OB History    Para Term  AB Living   4 1 1 0 1 1   SAB IAB Ectopic Molar Multiple Live Births   1 0 0 0 0 1      # Outcome Date GA Lbr Celestine/2nd Weight Sex Type Anes PTL Lv   4 Current            3 Term 21 38w3d  4080 g (8 lb 15.9 oz) M CS-LTranv Spinal N MIRIAM      Birth Comments: Alexei 1      Name: Manuelito       Apgar1: 8  Apgar5: 9   2 SAB 20 7w3d             Birth Comments: with D&C   1                Obstetric Comments   23 - LMP rejected - IUP at 6- 4 weeks - BRANDO 24 - [facility]    3/19/24 - 20-4 weeks -normal completed anatomy, normal cervical length.-[facility]    24 - 24-4 weeks - EFW 70%, AC 96% - [facility]    24 - 27-4 weeks - EFW 86%, AC >97% - [facility]     24 - 31-4 weeks - EFW >90%, AC >97% - [facility]         Past Medical History:   Diagnosis Date    Abnormal Pap smear of cervix     ADHD (attention deficit hyperactivity disorder)     never medicated    Anxiety     not medicated during pregnancy    Arthritis     Clinical diagnosis of COVID-19 2022    HEADACHE ONLY    Depression     not medicated during pregnancy    HPV in female     Impingement syndrome of left shoulder     Miscarriage     Obesity 2008    Sleep apnea     cpap occasionally       Past Surgical History:   Procedure Laterality Date    ADENOIDECTOMY      APPENDECTOMY N/A 2023    Procedure: APPENDECTOMY LAPAROSCOPIC;  Surgeon: Brandt Lemus Jr., MD;  Location: [facility] OR;  Service: General;  Laterality: N/A;     SECTION N/A 2021    Procedure:  SECTION PRIMARY;  Surgeon: Yadi Barrera MD;  Location: [facility] LABOR DELIVERY;  Service: Obstetrics/Gynecology;  Laterality: N/A;    D & C WITH SUCTION N/A 2020    Procedure: DILATATION AND CURETTAGE WITH SUCTION;  Surgeon: Yadi Barrera MD;  Location: [facility] OR;   Service: Obstetrics/Gynecology;  Laterality: N/A;    D & C WITH SUCTION N/A 6/7/2023    Procedure: DILATATION AND CURETTAGE WITH SUCTION;  Surgeon: Yadi Barrera MD;  Location: [facility] OR;  Service: Obstetrics/Gynecology;  Laterality: N/A;    EAR TUBES      JOINT MANIPULATION Left 7/26/2022    Procedure: LEFT SHOULDER MANIPULATION;  Surgeon: Navid Hinds MD;  Location: [facility] OR [facility];  Service: Orthopedics;  Laterality: Left;    LAPAROSCOPIC CHOLECYSTECTOMY      SHOULDER ARTHROSCOPY W/ SUPERIOR LABRAL ANTERIOR POSTERIOR REPAIR Left 03/29/2022    Procedure: LEFT SHOULDER ARTHROSCOPY LBARAL REPAIR WITH DISTAL CLAVICLE EXCISION AND SUBACROMIAL DECOMPRESSION;  Surgeon: Navid Hinds MD;  Location: [facility] OR [facility];  Service: Orthopedics;  Laterality: Left;    TONSILLECTOMY      WISDOM TOOTH EXTRACTION         No current facility-administered medications on file prior to encounter.     Current Outpatient Medications on File Prior to Encounter   Medication Sig Dispense Refill    acetaminophen (TYLENOL) 500 MG tablet Take 1 tablet by mouth Every 6 (Six) Hours As Needed for Mild Pain.      aspirin 81 MG EC tablet Take 1 tablet by mouth Daily. 90 tablet 2    metFORMIN ER (GLUCOPHAGE-XR) 500 MG 24 hr tablet Take 2 tablets by mouth Daily With Breakfast. 60 tablet 12    prenatal vitamin (prenatal, CLASSIC, vitamin) tablet Take 1 tablet by mouth Daily.         No Known Allergies    Family History   Problem Relation Age of Onset    Thyroid disease Mother     Depression Mother     Anxiety disorder Mother     Miscarriages / Stillbirths Mother     Vision loss Mother     Hypertension Father     Hyperlipidemia Father     Cancer Maternal Grandmother     Pancreatic cancer Maternal Grandmother     Pancreatitis Maternal Grandmother     No Known Problems Brother     No Known Problems Sister     Diabetes Maternal Grandfather     Ovarian cancer Other     Malig Hyperthermia Neg Hx     Breast cancer Neg Hx     Uterine  cancer Neg Hx     Colon cancer Neg Hx        Social History     Socioeconomic History    Marital status:      Spouse name: adolfo   Tobacco Use    Smoking status: Never    Smokeless tobacco: Never   Vaping Use    Vaping status: Never Used   Substance and Sexual Activity    Alcohol use: No    Drug use: No    Sexual activity: Yes     Partners: Male     Birth control/protection: None           Review of Systems   Constitutional:  Negative for chills and fever.   HENT: Negative.     Eyes:  Negative for photophobia and visual disturbance.   Respiratory:  Negative for shortness of breath.    Cardiovascular:  Negative for chest pain.   Gastrointestinal:  Positive for abdominal pain and vomiting. Negative for nausea.   Psychiatric/Behavioral:  The patient is not nervous/anxious.           PHYSICAL EXAM:      VITAL SIGNS:  There were no vitals filed for this visit.         FHT'S:    Moderate variability with accelerations                                     PHYSICAL EXAM:      General: well developed; well nourished  no acute distress   Heart: Not performed.   Lungs   breathing is unlabored   Abdomen: Gravid and non tender     Extremities: trace edema, DTRs 1 plus, no clonus       Cervix: Per RN        Contractions:   none                    LABS AND TESTING ORDERED:  Uterine and fetal monitoring  Urinalysis  ROM plus, serial cervical exams    LAB RESULTS:    No results found for this or any previous visit (from the past 24 hour(s)).    Lab Results   Component Value Date    ABO A 12/14/2023    RH Negative 12/14/2023       Lab Results   Component Value Date    STREPGPB Negative 03/22/2021                 External Prenatal Results       Pregnancy Outside Results - Transcribed From Office Records - See Scanned Records For Details       Test Value Date Time    ABO  A  12/14/23 1344    Rh  Negative  12/14/23 1344    Antibody Screen  Negative  05/07/24 0916       Negative  12/14/23 1344    Varicella IgG  2,299 index  23 1344    Rubella  3.31 index 23 1344    Hgb  10.9 g/dL 24 1137       11.5 g/dL 24 0916       11.8 g/dL 24 1819       11.8 g/dL 24 0850       13.4 g/dL 23 1344    Hct  33.8 % 24 1137       36.0 % 24 0916       35.6 % 24 1819       35.8 % 24 0850       41.5 % 23 1344    HgB A1c   5.7 % 24 1137       5.8 % 23 1344    1h GTT  113 mg/dL 24 0916       107 mg/dL 24 0850    3h GTT Fasting       3h GTT 1 hour       3h GTT 2 hour       3h GTT 3 hour        Gonorrhea (discrete)  Negative  23 1453    Chlamydia (discrete)  Negative  23 1453    RPR  Non Reactive  24 0916       Non Reactive  23 1344    Syphilis Antibody       HBsAg  Negative  23 1344    Herpes Simplex Virus PCR       Herpes Simplex VIrus Culture       HIV  Non Reactive  23 1344    Hep C RNA Quant PCR       Hep C Antibody  Non Reactive  23 1344    AFP       NIPT       Cystic Fibroisis        Group B Strep       GBS Susceptibility to Clindamycin       GBS Susceptibility to Erythromycin       Fetal Fibronectin       Genetic Testing, Maternal Blood                 Drug Screening       Test Value Date Time    Urine Drug Screen       Amphetamine Screen       Barbiturate Screen       Benzodiazepine Screen       Methadone Screen       Phencyclidine Screen       Opiates Screen       THC Screen       Cocaine Screen       Propoxyphene Screen       Buprenorphine Screen       Methamphetamine Screen       Oxycodone Screen       Tricyclic Antidepressants Screen                 Legend    ^: Historical                              Impression:   @ 32w4d .  Final Diagnosis: Episode of vomiting, tolerating p.o. liquids now, mild clinical dehydration, rule out  labor, rule out rupture of membrane    Plan:  1.  IV fluids 1 L , continue to support p.o. hydration and antiemetics if patient desires  2. Plan of care has been reviewed with  patient along with risks, benefits of treatment.   All questions have been answered. Call health care provider for any further concerns and keep office appointments.  3.  Hydration precautions,  labor precautions, comfort measures      I discussed the patients findings and my recommendations with patient, family, and nursing staff      Tiaar Altamirano MD  2024  23:01 EDT

## 2024-06-13 ENCOUNTER — OFFICE VISIT (OUTPATIENT)
Dept: FAMILY MEDICINE CLINIC | Facility: CLINIC | Age: 29
End: 2024-06-13
Payer: COMMERCIAL

## 2024-06-13 VITALS
DIASTOLIC BLOOD PRESSURE: 68 MMHG | HEART RATE: 80 BPM | HEIGHT: 69 IN | OXYGEN SATURATION: 96 % | TEMPERATURE: 97.1 F | SYSTOLIC BLOOD PRESSURE: 112 MMHG | BODY MASS INDEX: 43.4 KG/M2 | RESPIRATION RATE: 16 BRPM | WEIGHT: 293 LBS

## 2024-06-13 DIAGNOSIS — J30.1 SEASONAL ALLERGIC RHINITIS DUE TO POLLEN: Primary | ICD-10-CM

## 2024-06-13 DIAGNOSIS — Z33.1 PREGNANCY, INCIDENTAL: ICD-10-CM

## 2024-06-13 PROCEDURE — 99213 OFFICE O/P EST LOW 20 MIN: CPT | Performed by: NURSE PRACTITIONER

## 2024-06-13 PROCEDURE — 87426 SARSCOV CORONAVIRUS AG IA: CPT | Performed by: NURSE PRACTITIONER

## 2024-06-13 NOTE — PROGRESS NOTES
"Chief Complaint  Wheezing, Loss Of Taste, Loss Of Smell, and sneezing    Subjective        Davida Quinonez presents to [facility] PRIMARY CARE  History of Present Illness    History of Present Illness  The patient is a 28-year-old female who presents for evaluation of wheezing, chest pain, sneezing, and congestion.    The patient reports experiencing wheezing, chest pain, and sneezing since yesterday, accompanied by a sensation of congestion. The congestion has improved today, however, she experienced significant mucus production yesterday. She is currently 33 weeks pregnant and experiences difficulty breathing when outdoors, which she attributes to the heat. She denies any pulmonary conditions such as asthma. She also reports a temporary loss of taste and smell, which she attributes to her congestion. She has a history of seasonal allergies, which are exacerbated during her pregnancy. She is not currently on any allergy medication and reports mild ear pain.  Has not been taking any allergy medicine as she prefers not to take any medication when pregnant unless absolutely needed.  Has a little bit of blood tinge in nasal discharge and sputum.       Objective   Vital Signs:  /68   Pulse 80   Temp 97.1 °F (36.2 °C) (Infrared)   Resp 16   Ht 175.3 cm (69\")   Wt (!) 152 kg (335 lb 8 oz)   SpO2 96%   BMI 49.54 kg/m²   Estimated body mass index is 49.54 kg/m² as calculated from the following:    Height as of this encounter: 175.3 cm (69\").    Weight as of this encounter: 152 kg (335 lb 8 oz).             Physical Exam  Vitals and nursing note reviewed.   Constitutional:       General: She is not in acute distress.     Appearance: She is well-developed. She is not ill-appearing or diaphoretic.   HENT:      Head: Normocephalic and atraumatic.      Right Ear: Ear canal and external ear normal.      Left Ear: Ear canal and external ear normal.      Ears:      Comments: Bilateral TMs cloudy, " no erythema     Nose: Congestion present.      Mouth/Throat:      Mouth: Mucous membranes are moist.      Pharynx: No posterior oropharyngeal erythema.   Eyes:      General:         Right eye: No discharge.         Left eye: No discharge.      Conjunctiva/sclera: Conjunctivae normal.   Cardiovascular:      Rate and Rhythm: Normal rate and regular rhythm.      Heart sounds: Normal heart sounds.   Pulmonary:      Effort: Pulmonary effort is normal.      Breath sounds: Normal breath sounds. No wheezing.      Comments: Dry cough noted  Abdominal:      General: Bowel sounds are normal.      Palpations: Abdomen is soft.      Tenderness: There is no abdominal tenderness.   Musculoskeletal:         General: No deformity.      Cervical back: Neck supple.      Comments: Gait smooth and steady   Lymphadenopathy:      Cervical: Cervical adenopathy (Right side) present.   Skin:     General: Skin is warm and dry.   Neurological:      Mental Status: She is alert and oriented to person, place, and time.   Psychiatric:         Mood and Affect: Mood normal.         Behavior: Behavior normal.          Physical Exam       Result Review :            Results                  Assessment and Plan     Diagnoses and all orders for this visit:    1. Seasonal allergic rhinitis due to pollen (Primary)    2. Pregnancy, incidental        Assessment & Plan  1. Congestion.  The patient's loss of taste and smell is likely attributable to her congestion. A nasal spray has been recommended prn or if she prefers, she can use nasal saline prn.  Avoid outside when possible due to [place] air quality.  Follow-up with PCP if not improving.  COVID test is negative.            Follow Up     No follow-ups on file.  Patient was given instructions and counseling regarding her condition or for health maintenance advice. Please see specific information pulled into the AVS if appropriate.    Patient or patient representative verbalized consent for the use of  Ambient Listening during the visit with  SCOTT Holder for chart documentation. 6/14/2024  07:18 EDT

## 2024-06-14 LAB
EXPIRATION DATE: NORMAL
INTERNAL CONTROL: NORMAL
Lab: NORMAL
SARS-COV-2 AG UPPER RESP QL IA.RAPID: NOT DETECTED

## 2024-06-18 ENCOUNTER — ROUTINE PRENATAL (OUTPATIENT)
Dept: OBSTETRICS AND GYNECOLOGY | Age: 29
End: 2024-06-18
Payer: COMMERCIAL

## 2024-06-18 VITALS — DIASTOLIC BLOOD PRESSURE: 80 MMHG | BODY MASS INDEX: 49.03 KG/M2 | SYSTOLIC BLOOD PRESSURE: 112 MMHG | WEIGHT: 293 LBS

## 2024-06-18 DIAGNOSIS — Z13.89 SCREENING FOR BLOOD OR PROTEIN IN URINE: ICD-10-CM

## 2024-06-18 DIAGNOSIS — O36.63X0 EXCESSIVE FETAL GROWTH AFFECTING MANAGEMENT OF PREGNANCY IN THIRD TRIMESTER, SINGLE OR UNSPECIFIED FETUS: ICD-10-CM

## 2024-06-18 DIAGNOSIS — O26.893 RH NEGATIVE STATUS DURING PREGNANCY IN THIRD TRIMESTER: ICD-10-CM

## 2024-06-18 DIAGNOSIS — O99.019 MATERNAL ANEMIA IN PREGNANCY, ANTEPARTUM: ICD-10-CM

## 2024-06-18 DIAGNOSIS — O99.210 OBESITY IN PREGNANCY, ANTEPARTUM: ICD-10-CM

## 2024-06-18 DIAGNOSIS — Q51.3 BICORNUATE UTERUS: ICD-10-CM

## 2024-06-18 DIAGNOSIS — Z67.91 RH NEGATIVE STATUS DURING PREGNANCY IN THIRD TRIMESTER: ICD-10-CM

## 2024-06-18 DIAGNOSIS — O34.219 PREVIOUS CESAREAN DELIVERY AFFECTING PREGNANCY: ICD-10-CM

## 2024-06-18 DIAGNOSIS — Z34.83 PRENATAL CARE, SUBSEQUENT PREGNANCY IN THIRD TRIMESTER: Primary | ICD-10-CM

## 2024-06-18 DIAGNOSIS — R73.09 ELEVATED HEMOGLOBIN A1C: ICD-10-CM

## 2024-06-18 DIAGNOSIS — Z30.2 REQUEST FOR STERILIZATION: ICD-10-CM

## 2024-06-18 LAB
BILIRUB BLD-MCNC: ABNORMAL MG/DL
GLUCOSE UR STRIP-MCNC: NEGATIVE MG/DL
KETONES UR QL: ABNORMAL
LEUKOCYTE EST, POC: NEGATIVE
NITRITE UR-MCNC: NEGATIVE MG/ML
PH UR: 5.5 [PH] (ref 5–8)
PROT UR STRIP-MCNC: ABNORMAL MG/DL
RBC # UR STRIP: NEGATIVE /UL
SP GR UR: 1.03 (ref 1–1.03)
UROBILINOGEN UR QL: ABNORMAL

## 2024-06-18 PROCEDURE — 0502F SUBSEQUENT PRENATAL CARE: CPT | Performed by: OBSTETRICS & GYNECOLOGY

## 2024-06-18 NOTE — PROGRESS NOTES
Chief Complaint   Patient presents with    Routine Prenatal Visit     Cc: ob check with [facility] today , reports baby moving well , no complaints today        HPI: 29 y.o.  at 33w4d notes for prenatal care    Last OB US growth (since 2024)         Value Time User    EFW%ILE  >90%ile 2024 11:27 AM Yadi Barrera MD    AC%ILE  >97%ile 2024 11:27 AM Yadi Barrera MD    FETAL SURVEY  NL/Complete 3/19/2024  9:05 AM Yadi Barrera MD          Vitals:    24 1304   BP: 112/80   Weight: (!) 151 kg (332 lb)     Total weight gain for pregnancy:  14.5 kg (32 lb)    Review of systems:   Gen: negative  CV:     negative  GI: negative  :   negative and good fetal movement noted   MS:    negative  Neuro: negative  Pul: negative    Physical Exam  Vitals and nursing note reviewed.   Constitutional:       Appearance: Normal appearance. She is obese.   Pulmonary:      Effort: Pulmonary effort is normal.   Neurological:      Mental Status: She is alert.   Psychiatric:         Mood and Affect: Mood normal.         Thought Content: Thought content normal.         Judgment: Judgment normal.           A/P  1. Intrauterine pregnancy at 33w4d   2. Pregnancy Risk:  HIGH RISK    Diagnoses and all orders for this visit:    1. Prenatal care, subsequent pregnancy in third trimester (Primary)    2. Screening for blood or protein in urine  -     POC Urinalysis Dipstick    3. Elevated hemoglobin A1c    4. Bicornuate uterus    5. Request for sterilization    6. Excessive fetal growth affecting management of pregnancy in third trimester, single or unspecified fetus    7. Maternal anemia in pregnancy, antepartum    8. Rh negative status during pregnancy in third trimester    9. Previous  delivery affecting pregnancy    10. Obesity in pregnancy, antepartum        Pre-eclampsia symptoms were discussed and warnings were given.   labor was discussed.  Warnings were provided.  Nutrition and weight  gain were addressed.  -----------------------  PLAN:   Return in about 1 week (around 6/25/2024), or ob check and BPP.  Obesity in pregnancy-BPP 8 out of 8  Rh--s/p  RhoGAM  Repeat section and tubal scheduled at 39 weeks  Yadi Barrera MD  6/18/2024 13:08 EDT

## 2024-06-25 ENCOUNTER — ROUTINE PRENATAL (OUTPATIENT)
Dept: OBSTETRICS AND GYNECOLOGY | Age: 29
End: 2024-06-25
Payer: COMMERCIAL

## 2024-06-25 VITALS — WEIGHT: 293 LBS | DIASTOLIC BLOOD PRESSURE: 80 MMHG | SYSTOLIC BLOOD PRESSURE: 118 MMHG | BODY MASS INDEX: 49.77 KG/M2

## 2024-06-25 DIAGNOSIS — Z13.89 SCREENING FOR BLOOD OR PROTEIN IN URINE: ICD-10-CM

## 2024-06-25 DIAGNOSIS — O40.3XX0 POLYHYDRAMNIOS IN THIRD TRIMESTER COMPLICATION, SINGLE OR UNSPECIFIED FETUS: ICD-10-CM

## 2024-06-25 DIAGNOSIS — Q51.3 BICORNUATE UTERUS: ICD-10-CM

## 2024-06-25 DIAGNOSIS — Z34.83 PRENATAL CARE, SUBSEQUENT PREGNANCY IN THIRD TRIMESTER: Primary | ICD-10-CM

## 2024-06-25 DIAGNOSIS — Z30.2 REQUEST FOR STERILIZATION: ICD-10-CM

## 2024-06-25 DIAGNOSIS — O34.219 PREVIOUS CESAREAN DELIVERY AFFECTING PREGNANCY: ICD-10-CM

## 2024-06-25 DIAGNOSIS — O36.63X0 EXCESSIVE FETAL GROWTH AFFECTING MANAGEMENT OF PREGNANCY IN THIRD TRIMESTER, SINGLE OR UNSPECIFIED FETUS: ICD-10-CM

## 2024-06-25 DIAGNOSIS — O99.210 OBESITY IN PREGNANCY, ANTEPARTUM: ICD-10-CM

## 2024-06-25 DIAGNOSIS — O99.019 MATERNAL ANEMIA IN PREGNANCY, ANTEPARTUM: ICD-10-CM

## 2024-06-25 DIAGNOSIS — Z87.59 HISTORY OF GESTATIONAL HYPERTENSION: ICD-10-CM

## 2024-06-25 DIAGNOSIS — O26.893 RH NEGATIVE STATUS DURING PREGNANCY IN THIRD TRIMESTER: ICD-10-CM

## 2024-06-25 DIAGNOSIS — Z67.91 RH NEGATIVE STATUS DURING PREGNANCY IN THIRD TRIMESTER: ICD-10-CM

## 2024-06-25 DIAGNOSIS — R73.09 ELEVATED HEMOGLOBIN A1C: ICD-10-CM

## 2024-06-25 LAB
BILIRUB BLD-MCNC: NEGATIVE MG/DL
GLUCOSE UR STRIP-MCNC: NEGATIVE MG/DL
KETONES UR QL: ABNORMAL
LEUKOCYTE EST, POC: NEGATIVE
NITRITE UR-MCNC: NEGATIVE MG/ML
PH UR: 5.5 [PH] (ref 5–8)
PROT UR STRIP-MCNC: ABNORMAL MG/DL
RBC # UR STRIP: NEGATIVE /UL
SP GR UR: 1.03 (ref 1–1.03)
UROBILINOGEN UR QL: NORMAL

## 2024-06-25 PROCEDURE — 0502F SUBSEQUENT PRENATAL CARE: CPT | Performed by: OBSTETRICS & GYNECOLOGY

## 2024-06-25 NOTE — PROGRESS NOTES
Chief Complaint   Patient presents with    Routine Prenatal Visit     CC: Ob check with BPP us , c/o cramping , pelvic pain with pressure with back pain , denies any VB or spotting, reports good FM        HPI: 29 y.o.  at 34w4d presents for prenatal care      Last OB US growth (since 2024)         Value Time User    EFW%ILE  >90%ile 2024 11:27 AM Yadi Barrera MD    AC%ILE  >97%ile 2024 11:27 AM Yadi Barrera MD    FETAL SURVEY  NL/Complete 3/19/2024  9:05 AM Yadi Barrera MD          Vitals:    24 1527   BP: 118/80   Weight: (!) 153 kg (337 lb)     Total weight gain for pregnancy:  16.8 kg (37 lb)    Review of systems:   Gen: negative  CV:     negative  GI: negative  :   humble brownlee type contractions and pelvic pressure  MS:    negative  Neuro: negative  Pul: negative    Physical Exam  Vitals and nursing note reviewed.   Constitutional:       Appearance: Normal appearance. She is obese.   Pulmonary:      Effort: Pulmonary effort is normal.   Neurological:      Mental Status: She is alert.   Psychiatric:         Mood and Affect: Mood normal.         Thought Content: Thought content normal.         Judgment: Judgment normal.           A/P  1. Intrauterine pregnancy at 34w4d   2. Pregnancy Risk:  HIGH RISK    Diagnoses and all orders for this visit:    1. Prenatal care, subsequent pregnancy in third trimester (Primary)    2. Screening for blood or protein in urine  -     POC Urinalysis Dipstick    3. Elevated hemoglobin A1c    4. Bicornuate uterus    5. Request for sterilization    6. History of gestational hypertension    7. Excessive fetal growth affecting management of pregnancy in third trimester, single or unspecified fetus    8. Polyhydramnios in third trimester complication, single or unspecified fetus    9. Previous  delivery affecting pregnancy    10. Rh negative status during pregnancy in third trimester    11. Maternal anemia in pregnancy,  antepartum    12. Obesity in pregnancy, antepartum        Nutrition and weight gain were addressed.  -----------------------  PLAN:   Return in about 1 week (around 7/2/2024), or ob check and BPP/growth.  Obesity - BPP today 8/8   H/O GHTN - BP wnl   Mild poly   Cont weekly BPP     Yadi Barrera MD  6/25/2024 16:23 EDT

## 2024-06-26 ENCOUNTER — TELEPHONE (OUTPATIENT)
Dept: OBSTETRICS AND GYNECOLOGY | Age: 29
End: 2024-06-26
Payer: COMMERCIAL

## 2024-06-26 RX ORDER — METFORMIN HYDROCHLORIDE 500 MG/1
1000 TABLET, EXTENDED RELEASE ORAL
Qty: 60 TABLET | Refills: 12 | Status: SHIPPED | OUTPATIENT
Start: 2024-06-26

## 2024-06-26 NOTE — TELEPHONE ENCOUNTER
Please inform patient she had mildly elevated fluid yesterday but does not change anything for her or her pregnancy or baby. We are watching it every week and it can go up and down as the baby swallows and urinates.

## 2024-06-26 NOTE — TELEPHONE ENCOUNTER
----- Message from Nida GONG sent at 6/26/2024  8:42 AM EDT -----  Regarding: FW: Question  Contact: [phone]        ----- Message -----  From: Davida Quinonez  Sent: 6/26/2024   7:29 AM EDT  To: [facility]  Subject: Question                                         Good morning , I seen in my ultrasound I have Polyhydramnios. I was wondering what that means for me and baby.   I also spent a hour with period like cramps last night but when I got up to walk around it went away. I am assuming [place] brownlee but wanted to make sure that was normal.

## 2024-07-02 ENCOUNTER — ROUTINE PRENATAL (OUTPATIENT)
Dept: OBSTETRICS AND GYNECOLOGY | Age: 29
End: 2024-07-02
Payer: COMMERCIAL

## 2024-07-02 VITALS — SYSTOLIC BLOOD PRESSURE: 120 MMHG | WEIGHT: 293 LBS | BODY MASS INDEX: 49.91 KG/M2 | DIASTOLIC BLOOD PRESSURE: 76 MMHG

## 2024-07-02 DIAGNOSIS — Z67.91 RH NEGATIVE STATUS DURING PREGNANCY IN THIRD TRIMESTER: ICD-10-CM

## 2024-07-02 DIAGNOSIS — E28.2 PCOS (POLYCYSTIC OVARIAN SYNDROME): ICD-10-CM

## 2024-07-02 DIAGNOSIS — Q51.3 BICORNUATE UTERUS: ICD-10-CM

## 2024-07-02 DIAGNOSIS — Z87.59 HISTORY OF GESTATIONAL HYPERTENSION: ICD-10-CM

## 2024-07-02 DIAGNOSIS — O34.219 PREVIOUS CESAREAN DELIVERY AFFECTING PREGNANCY: ICD-10-CM

## 2024-07-02 DIAGNOSIS — Z36.85 ANTENATAL SCREENING FOR STREPTOCOCCUS B: Primary | ICD-10-CM

## 2024-07-02 DIAGNOSIS — O26.893 RH NEGATIVE STATUS DURING PREGNANCY IN THIRD TRIMESTER: ICD-10-CM

## 2024-07-02 DIAGNOSIS — Z30.2 REQUEST FOR STERILIZATION: ICD-10-CM

## 2024-07-02 DIAGNOSIS — O99.210 OBESITY IN PREGNANCY, ANTEPARTUM: ICD-10-CM

## 2024-07-02 DIAGNOSIS — R73.09 ELEVATED HEMOGLOBIN A1C: ICD-10-CM

## 2024-07-02 DIAGNOSIS — O36.63X0 EXCESSIVE FETAL GROWTH AFFECTING MANAGEMENT OF PREGNANCY IN THIRD TRIMESTER, SINGLE OR UNSPECIFIED FETUS: ICD-10-CM

## 2024-07-02 DIAGNOSIS — O99.019 MATERNAL ANEMIA IN PREGNANCY, ANTEPARTUM: ICD-10-CM

## 2024-07-02 DIAGNOSIS — Z13.89 SCREENING FOR BLOOD OR PROTEIN IN URINE: ICD-10-CM

## 2024-07-02 PROCEDURE — 0502F SUBSEQUENT PRENATAL CARE: CPT | Performed by: OBSTETRICS & GYNECOLOGY

## 2024-07-02 NOTE — PROGRESS NOTES
Chief Complaint   Patient presents with    Routine Prenatal Visit     Cc: ob check with BPP and growth US today , GBS today  , pt said she had ctx yesterday morning pretty painful , today doing well .       HPI: 29 y.o.  at 35w4d Presents for prenatal care    Last OB US growth (since 2024)         Value Time User    EFW%ILE  >90%ile 2024  1:13 PM Yadi Barrera MD    AC%ILE  >97%ile 2024  1:13 PM Yadi Barrera MD    FETAL SURVEY  NL/Complete 3/19/2024  9:05 AM Yadi Barrera MD          Vitals:    24 1305   BP: 120/76   Weight: (!) 153 kg (338 lb)     Total weight gain for pregnancy:  17.2 kg (38 lb)    Review of systems:   Gen: negative  CV:     negative  GI: negative  :   good fetal movement noted  and humble brownlee type contractions  MS:    negative  Neuro: negative  Pul: negative    Physical Exam  Vitals and nursing note reviewed.   Constitutional:       Appearance: Normal appearance. She is obese.   Pulmonary:      Effort: Pulmonary effort is normal.   Neurological:      Mental Status: She is alert.   Psychiatric:         Mood and Affect: Mood normal.         Thought Content: Thought content normal.         Judgment: Judgment normal.           A/P  1. Intrauterine pregnancy at 35w4d   2. Pregnancy Risk:  HIGH RISK    Diagnoses and all orders for this visit:    1.  screening for streptococcus B (Primary)  -     Group B Streptococcus Culture - Swab, Vaginal/Rectum    2. Screening for blood or protein in urine  -     POC Urinalysis Dipstick    3. Elevated hemoglobin A1c    4. PCOS (polycystic ovarian syndrome)    5. Bicornuate uterus    6. Request for sterilization    7. History of gestational hypertension    8. Excessive fetal growth affecting management of pregnancy in third trimester, single or unspecified fetus    9. Maternal anemia in pregnancy, antepartum    10. Rh negative status during pregnancy in third trimester    11. Previous  delivery  affecting pregnancy    12. Obesity in pregnancy, antepartum        Pre-eclampsia symptoms were discussed and warnings were given.   labor was discussed.  Warnings were provided.  Nutrition and weight gain were addressed.  -----------------------  PLAN:   Return in about 1 week (around 2024), or ob check and BPP.  LGA on ultrasound today-estimated fetal weight greater than the 90th percentile  BPP today 8 out of 8  ELIZABETH a 20 cm  GBS collected    Yadi Barrera MD  2024 13:16 EDT

## 2024-07-03 ENCOUNTER — TELEPHONE (OUTPATIENT)
Dept: OBSTETRICS AND GYNECOLOGY | Age: 29
End: 2024-07-03
Payer: COMMERCIAL

## 2024-07-03 NOTE — TELEPHONE ENCOUNTER
"Patient returning call, Patient stating the pain feels like a cramping pain, patient denies lof, vb, or contractions. Patient reports good fetal movement at this time and agrees to monitor it. Patient is not currently using a belly band. Patient stating \"They do not fit\" .   "
OB PT 35W 5     Pt called in concerned about pelvic pain that she is having when she walks or spreads her legs apart. Says that pain isn't to bad when she sits. She states that she is not leaking any fluid. Pt was seen yesterday. Pt wanted to know if this is normal?  
Pt notified   
lvmtcb  
Vaccine status unknown

## 2024-07-06 LAB — B-HEM STREP SPEC QL CULT: NEGATIVE

## 2024-07-09 ENCOUNTER — HOSPITAL ENCOUNTER (EMERGENCY)
Facility: HOSPITAL | Age: 29
Discharge: HOME | End: 2024-07-09
Attending: OBSTETRICS & GYNECOLOGY | Admitting: OBSTETRICS & GYNECOLOGY
Payer: COMMERCIAL

## 2024-07-09 ENCOUNTER — ROUTINE PRENATAL (OUTPATIENT)
Dept: OBSTETRICS AND GYNECOLOGY | Age: 29
End: 2024-07-09
Payer: COMMERCIAL

## 2024-07-09 VITALS
SYSTOLIC BLOOD PRESSURE: 120 MMHG | OXYGEN SATURATION: 98 % | HEART RATE: 115 BPM | BODY MASS INDEX: 43.4 KG/M2 | RESPIRATION RATE: 20 BRPM | HEIGHT: 69 IN | WEIGHT: 293 LBS | TEMPERATURE: 97.9 F | DIASTOLIC BLOOD PRESSURE: 65 MMHG

## 2024-07-09 VITALS — SYSTOLIC BLOOD PRESSURE: 120 MMHG | WEIGHT: 293 LBS | DIASTOLIC BLOOD PRESSURE: 74 MMHG | BODY MASS INDEX: 50.36 KG/M2

## 2024-07-09 DIAGNOSIS — R73.09 ELEVATED HEMOGLOBIN A1C: ICD-10-CM

## 2024-07-09 DIAGNOSIS — Q51.3 BICORNUATE UTERUS: ICD-10-CM

## 2024-07-09 DIAGNOSIS — O36.63X0 EXCESSIVE FETAL GROWTH AFFECTING MANAGEMENT OF PREGNANCY IN THIRD TRIMESTER, SINGLE OR UNSPECIFIED FETUS: ICD-10-CM

## 2024-07-09 DIAGNOSIS — O99.210 OBESITY IN PREGNANCY, ANTEPARTUM: ICD-10-CM

## 2024-07-09 DIAGNOSIS — O99.019 MATERNAL ANEMIA IN PREGNANCY, ANTEPARTUM: ICD-10-CM

## 2024-07-09 DIAGNOSIS — Z34.83 PRENATAL CARE, SUBSEQUENT PREGNANCY IN THIRD TRIMESTER: Primary | ICD-10-CM

## 2024-07-09 DIAGNOSIS — Z13.89 SCREENING FOR BLOOD OR PROTEIN IN URINE: ICD-10-CM

## 2024-07-09 DIAGNOSIS — Z87.59 HISTORY OF GESTATIONAL HYPERTENSION: ICD-10-CM

## 2024-07-09 DIAGNOSIS — Z67.91 RH NEGATIVE STATUS DURING PREGNANCY IN THIRD TRIMESTER: ICD-10-CM

## 2024-07-09 DIAGNOSIS — O26.893 RH NEGATIVE STATUS DURING PREGNANCY IN THIRD TRIMESTER: ICD-10-CM

## 2024-07-09 DIAGNOSIS — Z30.2 REQUEST FOR STERILIZATION: ICD-10-CM

## 2024-07-09 DIAGNOSIS — O34.219 PREVIOUS CESAREAN DELIVERY AFFECTING PREGNANCY: ICD-10-CM

## 2024-07-09 LAB
A1 MICROGLOB PLACENTAL VAG QL: NEGATIVE
BACTERIA UR QL AUTO: ABNORMAL /HPF
BILIRUB BLD-MCNC: NEGATIVE MG/DL
BILIRUB UR QL STRIP: NEGATIVE
CLARITY UR: CLEAR
COLOR UR: YELLOW
GLUCOSE UR STRIP-MCNC: NEGATIVE MG/DL
GLUCOSE UR STRIP-MCNC: NEGATIVE MG/DL
HGB UR QL STRIP.AUTO: NEGATIVE
HYALINE CASTS UR QL AUTO: ABNORMAL /LPF
KETONES UR QL STRIP: ABNORMAL
KETONES UR QL: ABNORMAL
LEUKOCYTE EST, POC: NEGATIVE
LEUKOCYTE ESTERASE UR QL STRIP.AUTO: ABNORMAL
NITRITE UR QL STRIP: NEGATIVE
NITRITE UR-MCNC: NEGATIVE MG/ML
PH UR STRIP.AUTO: 6 [PH] (ref 5–8)
PH UR: 5.5 [PH] (ref 5–8)
PROT UR QL STRIP: ABNORMAL
PROT UR STRIP-MCNC: ABNORMAL MG/DL
RBC # UR STRIP: ABNORMAL /HPF
RBC # UR STRIP: NEGATIVE /UL
REF LAB TEST METHOD: ABNORMAL
SP GR UR STRIP: 1.02 (ref 1–1.03)
SP GR UR: 1.03 (ref 1–1.03)
SQUAMOUS #/AREA URNS HPF: ABNORMAL /HPF
UROBILINOGEN UR QL STRIP: ABNORMAL
UROBILINOGEN UR QL: ABNORMAL
WBC # UR STRIP: ABNORMAL /HPF

## 2024-07-09 PROCEDURE — 59025 FETAL NON-STRESS TEST: CPT

## 2024-07-09 PROCEDURE — 84112 EVAL AMNIOTIC FLUID PROTEIN: CPT | Performed by: OBSTETRICS & GYNECOLOGY

## 2024-07-09 PROCEDURE — 0502F SUBSEQUENT PRENATAL CARE: CPT | Performed by: OBSTETRICS & GYNECOLOGY

## 2024-07-09 PROCEDURE — 81001 URINALYSIS AUTO W/SCOPE: CPT | Performed by: OBSTETRICS & GYNECOLOGY

## 2024-07-09 PROCEDURE — 99284 EMERGENCY DEPT VISIT MOD MDM: CPT | Performed by: OBSTETRICS & GYNECOLOGY

## 2024-07-09 PROCEDURE — 87086 URINE CULTURE/COLONY COUNT: CPT | Performed by: OBSTETRICS & GYNECOLOGY

## 2024-07-09 NOTE — PROGRESS NOTES
Chief Complaint   Patient presents with    Routine Prenatal Visit     Cc: ob check with BPP and growth US today , GBS neg , no ob complaints , no more episodes of fluid loss        HPI: 29 y.o.  at 36w4d presents  for prenatal care    Last OB US growth (since 2024)         Value Time User    EFW%ILE  >90%ile 2024  1:13 PM Yadi Barerra MD    AC%ILE  >97%ile 2024  1:13 PM Yadi Barrera MD    FETAL SURVEY  NL/Complete 3/19/2024  9:05 AM Yadi Barrera MD          Vitals:    24 1533   BP: 120/74   Weight: (!) 155 kg (341 lb)     Total weight gain for pregnancy:  18.6 kg (41 lb)    Review of systems:   Gen: negative  CV:     negative  GI: negative  :   negative and good fetal movement noted   MS:    negative  Neuro: negative  Pul: negative    Physical Exam  Vitals and nursing note reviewed.   Constitutional:       Appearance: Normal appearance. She is obese.   Pulmonary:      Effort: Pulmonary effort is normal.   Neurological:      Mental Status: She is alert.   Psychiatric:         Mood and Affect: Mood normal.         Thought Content: Thought content normal.         Judgment: Judgment normal.           A/P  1. Intrauterine pregnancy at 36w4d   2. Pregnancy Risk:  HIGH RISK    Diagnoses and all orders for this visit:    1. Prenatal care, subsequent pregnancy in third trimester (Primary)    2. Screening for blood or protein in urine  -     POC Urinalysis Dipstick    3. Elevated hemoglobin A1c    4. Bicornuate uterus    5. Request for sterilization    6. History of gestational hypertension    7. Excessive fetal growth affecting management of pregnancy in third trimester, single or unspecified fetus    8. Maternal anemia in pregnancy, antepartum    9. Rh negative status during pregnancy in third trimester    10. Previous  delivery affecting pregnancy    11. Obesity in pregnancy, antepartum        Pre-eclampsia symptoms were discussed and warnings were given.  Routine  labor warnings were discussed and indications for L & D f/u including bleeding, regular contractions, decreased fetal movement or/and rupture of membranes.   -----------------------  PLAN:   Return in about 1 week (around 7/16/2024), or ob check and BPP.  BPP today 8/8   GBS neg     Yadi Barrera MD  7/9/2024 16:02 EDT

## 2024-07-09 NOTE — OBED NOTES
"[facility]  Davida Quinonez  : 1995  MRN: [id number]  CSN: [id number]    OB ED Provider Note    Subjective   Chief Complaint   Patient presents with    Leaking Fluid     CHARLENE with complaints of a gush of fluid last night around 10pm. She states it was clear and unsure if she has continued to leak as she states \"I'm always peeing a little\". Reports mild back cramping and denies VB.     Davida Quinonez is a 29 y.o. year old  with an Estimated Date of Delivery: 24 currently at 36w4d presenting with one episode of fluid loss around 2200 last night with significant further loss. She denies CTX or VB. FM is present.     Prenatal care has been with Dr. Barrera.  It has been complicated by previous C/S - (desires repeat ) and bicornuate uterus, Rh - status, and PCOS on metformin .    OB History    Para Term  AB Living   4 1 1 0 2 1   SAB IAB Ectopic Molar Multiple Live Births   2 0 0 0 0 1      # Outcome Date GA Lbr Celestine/2nd Weight Sex Type Anes PTL Lv   4 Current            3 Term 21 38w3d  4080 g (8 lb 15.9 oz) M CS-LTranv Spinal N MIRIAM      Birth Comments: Alexei Murrieta      Name: Manuelito       Apgar1: 8  Apgar5: 9   2 SAB 20 7w3d             Birth Comments: with D&C   1 SAB               Obstetric Comments   23 - LMP rejected - IUP at 6- 4 weeks - BR[facility]O 24 - F    3/19/24 - 20-4 weeks -normal completed anatomy, normal cervical length.-F    24 - 24-4 weeks - EFW 70%, AC 96% - F    24 - 27-4 weeks - EFW 86%, AC >97% - [facility]     24 - 31-4 weeks - EFW >90%, AC >97% - [facility]    24 - 35-4 weeks - EFW >90 % AC >97% -[facility]      Past Medical History:   Diagnosis Date    Clinical diagnosis of COVID-19 2022    HEADACHE ONLY    Abnormal Pap smear of cervix     ADHD (attention deficit hyperactivity disorder)     never medicated    Anxiety     not medicated during pregnancy    Arthritis     Depression     not medicated during pregnancy    Gestational " hypertension     HPV in female     Impingement syndrome of left shoulder     Miscarriage     Obesity 2008    Sleep apnea     cpap occasionally     Past Surgical History:   Procedure Laterality Date    D & C WITH SUCTION N/A 2020    Procedure: DILATATION AND CURETTAGE WITH SUCTION;  Surgeon: Yadi Barrera MD;  Location: [facility] MAIN OR;  Service: Obstetrics/Gynecology;  Laterality: N/A;     SECTION N/A 2021    Procedure:  SECTION PRIMARY;  Surgeon: Yadi Barrera MD;  Location: [facility] LABOR DELIVERY;  Service: Obstetrics/Gynecology;  Laterality: N/A;    SHOULDER ARTHROSCOPY W/ SUPERIOR LABRAL ANTERIOR POSTERIOR REPAIR Left 2022    Procedure: LEFT SHOULDER ARTHROSCOPY LBARAL REPAIR WITH DISTAL CLAVICLE EXCISION AND SUBACROMIAL DECOMPRESSION;  Surgeon: Navid Hinds MD;  Location: [facility] OR [facility];  Service: Orthopedics;  Laterality: Left;    JOINT MANIPULATION Left 2022    Procedure: LEFT SHOULDER MANIPULATION;  Surgeon: Navid Hinds MD;  Location: [facility] OR [facility];  Service: Orthopedics;  Laterality: Left;    D & C WITH SUCTION N/A 2023    Procedure: DILATATION AND CURETTAGE WITH SUCTION;  Surgeon: Yadi Barrera MD;  Location: [facility] MAIN OR;  Service: Obstetrics/Gynecology;  Laterality: N/A;    APPENDECTOMY N/A 2023    Procedure: APPENDECTOMY LAPAROSCOPIC;  Surgeon: Brandt Lemus Jr., MD;  Location: [facility] MAIN OR;  Service: General;  Laterality: N/A;    ADENOIDECTOMY      EAR TUBES      LAPAROSCOPIC CHOLECYSTECTOMY      TONSILLECTOMY      WISDOM TOOTH EXTRACTION       No current facility-administered medications for this encounter.    No Known Allergies  Social History    Tobacco Use      Smoking status: Never        Passive exposure: Never      Smokeless tobacco: Never    Review of Systems   Genitourinary:  Positive for vaginal discharge.   All other systems reviewed and are negative.        Objective   /65 (BP Location: Right arm,  "Patient Position: Lying)   Pulse (!) 125   Temp 97.9 °F (36.6 °C) (Oral)   Resp 20   Ht 175.3 cm (69\")   Wt (!) 154 kg (340 lb 6.4 oz)   LMP 10/19/2023 (Exact Date)   SpO2 100%   BMI 50.27 kg/m²   General: well developed; well nourished  no acute distress   Abdomen: soft, non-tender; no masses  gravid    FHT's: reactive and category 1      Cervix: was checked (by RN): 1.5 cm / 60 % / -2 unchanged from last check in office   Presentation: cephalic   Contractions: none   Chest: Unlabored respirations    CV:  Tachycardic, RR   Ext:   No C/C/2+ BLE to above ankles   Back: CVA tenderness is deferred bilateral        Prenatal Labs  Lab Results   Component Value Date    HGB 10.9 (L) 06/04/2024    RUBELLAABIGG 3.31 12/14/2023    HEPBSAG Negative 12/14/2023    ABSCRN Negative 05/07/2024    VDA6WXR3 Non Reactive 12/14/2023    HEPCVIRUSABY Non Reactive 12/14/2023     05/07/2024    STREPGPB Negative 07/02/2024    URINECX 50,000 CFU/mL Normal Urogenital Breanna 04/09/2024    CHLAMNAA Negative 12/14/2023    NGONORRHON Negative 12/14/2023       Current Labs Reviewed   UA:    Lab Results   Component Value Date    SQUAMEPIUA 13-20 (A) 07/09/2024    SPECGRAVUR 1.021 07/09/2024    KETONESU 40 mg/dL (2+) (A) 07/09/2024    BLOODU Negative 07/09/2024    LEUKOCYTESUR Small (1+) (A) 07/09/2024    NITRITEU Negative 07/09/2024    RBCUA 0-2 07/09/2024    WBCUA 11-20 (A) 07/09/2024    BACTERIA 1+ (A) 07/09/2024     ROM+ negative     Assessment   IUP at 36w4d  R/O PPROM- no fluid seen on exam, ROM+ negative do not support diagnosis or PPROM  Prev c/s- for repeat     Plan   D/C home with PTL precautions. Return for worsening symptoms, acute changes. Keep scheduled appointments.     Nacho Solano MD  7/9/2024  09:32 EDT     "

## 2024-07-10 LAB — BACTERIA SPEC AEROBE CULT: NORMAL

## 2024-07-16 ENCOUNTER — HOSPITAL ENCOUNTER (INPATIENT)
Facility: HOSPITAL | Age: 29
LOS: 5 days | Discharge: HOME | End: 2024-07-21
Attending: OBSTETRICS & GYNECOLOGY | Admitting: OBSTETRICS & GYNECOLOGY
Payer: COMMERCIAL

## 2024-07-16 ENCOUNTER — ANESTHESIA (OUTPATIENT)
Dept: LABOR AND DELIVERY | Facility: HOSPITAL | Age: 29
End: 2024-07-16
Payer: COMMERCIAL

## 2024-07-16 ENCOUNTER — ANESTHESIA EVENT (OUTPATIENT)
Dept: LABOR AND DELIVERY | Facility: HOSPITAL | Age: 29
End: 2024-07-16
Payer: COMMERCIAL

## 2024-07-16 ENCOUNTER — ROUTINE PRENATAL (OUTPATIENT)
Dept: OBSTETRICS AND GYNECOLOGY | Age: 29
End: 2024-07-16
Payer: COMMERCIAL

## 2024-07-16 VITALS — DIASTOLIC BLOOD PRESSURE: 88 MMHG | BODY MASS INDEX: 50.36 KG/M2 | SYSTOLIC BLOOD PRESSURE: 132 MMHG | WEIGHT: 293 LBS

## 2024-07-16 DIAGNOSIS — R73.09 ELEVATED HEMOGLOBIN A1C: ICD-10-CM

## 2024-07-16 DIAGNOSIS — Z13.89 SCREENING FOR BLOOD OR PROTEIN IN URINE: ICD-10-CM

## 2024-07-16 DIAGNOSIS — Z34.83 PRENATAL CARE, SUBSEQUENT PREGNANCY IN THIRD TRIMESTER: Primary | ICD-10-CM

## 2024-07-16 DIAGNOSIS — O40.3XX0 POLYHYDRAMNIOS IN THIRD TRIMESTER COMPLICATION, SINGLE OR UNSPECIFIED FETUS: ICD-10-CM

## 2024-07-16 DIAGNOSIS — O99.210 OBESITY IN PREGNANCY, ANTEPARTUM: ICD-10-CM

## 2024-07-16 DIAGNOSIS — O26.892 RH NEGATIVE STATUS DURING PREGNANCY IN SECOND TRIMESTER: ICD-10-CM

## 2024-07-16 DIAGNOSIS — Z87.59 HISTORY OF GESTATIONAL HYPERTENSION: ICD-10-CM

## 2024-07-16 DIAGNOSIS — Z30.2 REQUEST FOR STERILIZATION: ICD-10-CM

## 2024-07-16 DIAGNOSIS — O36.63X0 EXCESSIVE FETAL GROWTH AFFECTING MANAGEMENT OF PREGNANCY IN THIRD TRIMESTER, SINGLE OR UNSPECIFIED FETUS: ICD-10-CM

## 2024-07-16 DIAGNOSIS — Q51.3 BICORNUATE UTERUS: ICD-10-CM

## 2024-07-16 DIAGNOSIS — O34.219 PREVIOUS CESAREAN DELIVERY AFFECTING PREGNANCY: ICD-10-CM

## 2024-07-16 DIAGNOSIS — Z67.91 RH NEGATIVE STATUS DURING PREGNANCY IN THIRD TRIMESTER: ICD-10-CM

## 2024-07-16 DIAGNOSIS — O99.019 MATERNAL ANEMIA IN PREGNANCY, ANTEPARTUM: ICD-10-CM

## 2024-07-16 DIAGNOSIS — O26.893 RH NEGATIVE STATUS DURING PREGNANCY IN THIRD TRIMESTER: ICD-10-CM

## 2024-07-16 DIAGNOSIS — Z67.91 RH NEGATIVE STATUS DURING PREGNANCY IN SECOND TRIMESTER: ICD-10-CM

## 2024-07-16 PROBLEM — O28.3 ABNORMAL FETAL ULTRASOUND: Status: ACTIVE | Noted: 2024-07-16

## 2024-07-16 PROBLEM — Z34.90 PREGNANCY: Status: ACTIVE | Noted: 2024-07-16

## 2024-07-16 LAB
ABO GROUP BLD: NORMAL
BASOPHILS # BLD AUTO: 0.02 10*3/MM3 (ref 0–0.2)
BASOPHILS NFR BLD AUTO: 0.2 % (ref 0–1.5)
BLD GP AB SCN SERPL QL: NEGATIVE
DEPRECATED RDW RBC AUTO: 43.3 FL (ref 37–54)
EOSINOPHIL # BLD AUTO: 0.11 10*3/MM3 (ref 0–0.4)
EOSINOPHIL NFR BLD AUTO: 1 % (ref 0.3–6.2)
ERYTHROCYTE [DISTWIDTH] IN BLOOD BY AUTOMATED COUNT: 13.8 % (ref 12.3–15.4)
HCT VFR BLD AUTO: 38.9 % (ref 34–46.6)
HGB BLD-MCNC: 12.8 G/DL (ref 12–15.9)
IMM GRANULOCYTES # BLD AUTO: 0.14 10*3/MM3 (ref 0–0.05)
IMM GRANULOCYTES NFR BLD AUTO: 1.2 % (ref 0–0.5)
LYMPHOCYTES # BLD AUTO: 2.14 10*3/MM3 (ref 0.7–3.1)
LYMPHOCYTES NFR BLD AUTO: 18.9 % (ref 19.6–45.3)
MCH RBC QN AUTO: 28.6 PG (ref 26.6–33)
MCHC RBC AUTO-ENTMCNC: 32.9 G/DL (ref 31.5–35.7)
MCV RBC AUTO: 86.8 FL (ref 79–97)
MONOCYTES # BLD AUTO: 0.9 10*3/MM3 (ref 0.1–0.9)
MONOCYTES NFR BLD AUTO: 7.9 % (ref 5–12)
NEUTROPHILS NFR BLD AUTO: 70.8 % (ref 42.7–76)
NEUTROPHILS NFR BLD AUTO: 8.03 10*3/MM3 (ref 1.7–7)
NRBC BLD AUTO-RTO: 0 /100 WBC (ref 0–0.2)
PLATELET # BLD AUTO: 254 10*3/MM3 (ref 140–450)
PMV BLD AUTO: 8.8 FL (ref 6–12)
RBC # BLD AUTO: 4.48 10*6/MM3 (ref 3.77–5.28)
RH BLD: NEGATIVE
T&S EXPIRATION DATE: NORMAL
TREPONEMA PALLIDUM IGG+IGM AB [PRESENCE] IN SERUM OR PLASMA BY IMMUNOASSAY: NORMAL
WBC NRBC COR # BLD AUTO: 11.34 10*3/MM3 (ref 3.4–10.8)

## 2024-07-16 PROCEDURE — S0260 H&P FOR SURGERY: HCPCS | Performed by: OBSTETRICS & GYNECOLOGY

## 2024-07-16 PROCEDURE — 25810000003 LACTATED RINGERS SOLUTION: Performed by: OBSTETRICS & GYNECOLOGY

## 2024-07-16 PROCEDURE — 85025 COMPLETE CBC W/AUTO DIFF WBC: CPT | Performed by: OBSTETRICS & GYNECOLOGY

## 2024-07-16 PROCEDURE — 86850 RBC ANTIBODY SCREEN: CPT | Performed by: OBSTETRICS & GYNECOLOGY

## 2024-07-16 PROCEDURE — 25810000003 LACTATED RINGERS PER 1000 ML: Performed by: OBSTETRICS & GYNECOLOGY

## 2024-07-16 PROCEDURE — 25810000003 LACTATED RINGERS PER 1000 ML: Performed by: NURSE ANESTHETIST, CERTIFIED REGISTERED

## 2024-07-16 PROCEDURE — 25010000002 ONDANSETRON PER 1 MG: Performed by: STUDENT IN AN ORGANIZED HEALTH CARE EDUCATION/TRAINING PROGRAM

## 2024-07-16 PROCEDURE — 86900 BLOOD TYPING SEROLOGIC ABO: CPT | Performed by: OBSTETRICS & GYNECOLOGY

## 2024-07-16 PROCEDURE — 86780 TREPONEMA PALLIDUM: CPT | Performed by: OBSTETRICS & GYNECOLOGY

## 2024-07-16 PROCEDURE — 86901 BLOOD TYPING SEROLOGIC RH(D): CPT | Performed by: OBSTETRICS & GYNECOLOGY

## 2024-07-16 PROCEDURE — 25010000002 CEFAZOLIN 3 G RECONSTITUTED SOLUTION 1 EACH VIAL: Performed by: OBSTETRICS & GYNECOLOGY

## 2024-07-16 PROCEDURE — 58611 LIGATE OVIDUCT(S) ADD-ON: CPT | Performed by: OBSTETRICS & GYNECOLOGY

## 2024-07-16 RX ORDER — FAMOTIDINE 10 MG/ML
20 INJECTION, SOLUTION INTRAVENOUS ONCE AS NEEDED
Status: COMPLETED | OUTPATIENT
Start: 2024-07-16 | End: 2024-07-16

## 2024-07-16 RX ORDER — SODIUM CHLORIDE 0.9 % (FLUSH) 0.9 %
10 SYRINGE (ML) INJECTION EVERY 12 HOURS SCHEDULED
Status: DISCONTINUED | OUTPATIENT
Start: 2024-07-16 | End: 2024-07-17

## 2024-07-16 RX ORDER — METHYLERGONOVINE MALEATE 0.2 MG/ML
200 INJECTION INTRAVENOUS ONCE AS NEEDED
Status: DISCONTINUED | OUTPATIENT
Start: 2024-07-16 | End: 2024-07-17

## 2024-07-16 RX ORDER — KETOROLAC TROMETHAMINE 30 MG/ML
30 INJECTION, SOLUTION INTRAMUSCULAR; INTRAVENOUS ONCE
Status: COMPLETED | OUTPATIENT
Start: 2024-07-16 | End: 2024-07-17

## 2024-07-16 RX ORDER — LIDOCAINE HYDROCHLORIDE 10 MG/ML
0.5 INJECTION, SOLUTION INFILTRATION; PERINEURAL ONCE AS NEEDED
Status: DISCONTINUED | OUTPATIENT
Start: 2024-07-16 | End: 2024-07-17

## 2024-07-16 RX ORDER — PHYTONADIONE 1 MG/.5ML
INJECTION, EMULSION INTRAMUSCULAR; INTRAVENOUS; SUBCUTANEOUS
Status: ACTIVE
Start: 2024-07-16 | End: 2024-07-17

## 2024-07-16 RX ORDER — FAMOTIDINE 10 MG/ML
20 INJECTION, SOLUTION INTRAVENOUS ONCE AS NEEDED
Status: DISCONTINUED | OUTPATIENT
Start: 2024-07-16 | End: 2024-07-17

## 2024-07-16 RX ORDER — OXYTOCIN/0.9 % SODIUM CHLORIDE 30/500 ML
250 PLASTIC BAG, INJECTION (ML) INTRAVENOUS CONTINUOUS
Status: ACTIVE | OUTPATIENT
Start: 2024-07-16 | End: 2024-07-16

## 2024-07-16 RX ORDER — ONDANSETRON 2 MG/ML
4 INJECTION INTRAMUSCULAR; INTRAVENOUS ONCE AS NEEDED
Status: COMPLETED | OUTPATIENT
Start: 2024-07-16 | End: 2024-07-16

## 2024-07-16 RX ORDER — CARBOPROST TROMETHAMINE 250 UG/ML
250 INJECTION, SOLUTION INTRAMUSCULAR
Status: DISCONTINUED | OUTPATIENT
Start: 2024-07-16 | End: 2024-07-17

## 2024-07-16 RX ORDER — ONDANSETRON 2 MG/ML
4 INJECTION INTRAMUSCULAR; INTRAVENOUS ONCE AS NEEDED
Status: DISCONTINUED | OUTPATIENT
Start: 2024-07-16 | End: 2024-07-17

## 2024-07-16 RX ORDER — OXYTOCIN/0.9 % SODIUM CHLORIDE 30/500 ML
999 PLASTIC BAG, INJECTION (ML) INTRAVENOUS ONCE
Status: COMPLETED | OUTPATIENT
Start: 2024-07-16 | End: 2024-07-17

## 2024-07-16 RX ORDER — CITRIC ACID/SODIUM CITRATE 334-500MG
30 SOLUTION, ORAL ORAL ONCE
Status: COMPLETED | OUTPATIENT
Start: 2024-07-16 | End: 2024-07-16

## 2024-07-16 RX ORDER — CITRIC ACID/SODIUM CITRATE 334-500MG
30 SOLUTION, ORAL ORAL ONCE
Status: DISCONTINUED | OUTPATIENT
Start: 2024-07-16 | End: 2024-07-17

## 2024-07-16 RX ORDER — MISOPROSTOL 200 UG/1
800 TABLET ORAL ONCE AS NEEDED
Status: DISCONTINUED | OUTPATIENT
Start: 2024-07-16 | End: 2024-07-17

## 2024-07-16 RX ORDER — SODIUM CHLORIDE, SODIUM LACTATE, POTASSIUM CHLORIDE, CALCIUM CHLORIDE 600; 310; 30; 20 MG/100ML; MG/100ML; MG/100ML; MG/100ML
125 INJECTION, SOLUTION INTRAVENOUS CONTINUOUS
Status: DISCONTINUED | OUTPATIENT
Start: 2024-07-16 | End: 2024-07-17

## 2024-07-16 RX ORDER — SODIUM CHLORIDE 9 MG/ML
40 INJECTION, SOLUTION INTRAVENOUS AS NEEDED
Status: DISCONTINUED | OUTPATIENT
Start: 2024-07-16 | End: 2024-07-17

## 2024-07-16 RX ORDER — TRANEXAMIC ACID 10 MG/ML
1000 INJECTION, SOLUTION INTRAVENOUS ONCE AS NEEDED
Status: DISCONTINUED | OUTPATIENT
Start: 2024-07-16 | End: 2024-07-17

## 2024-07-16 RX ORDER — ERYTHROMYCIN 5 MG/G
OINTMENT OPHTHALMIC
Status: ACTIVE
Start: 2024-07-16 | End: 2024-07-17

## 2024-07-16 RX ORDER — ACETAMINOPHEN 500 MG
1000 TABLET ORAL ONCE
Status: COMPLETED | OUTPATIENT
Start: 2024-07-16 | End: 2024-07-16

## 2024-07-16 RX ORDER — SODIUM CHLORIDE 0.9 % (FLUSH) 0.9 %
10 SYRINGE (ML) INJECTION AS NEEDED
Status: DISCONTINUED | OUTPATIENT
Start: 2024-07-16 | End: 2024-07-17

## 2024-07-16 RX ADMIN — SODIUM CHLORIDE, POTASSIUM CHLORIDE, SODIUM LACTATE AND CALCIUM CHLORIDE 1000 ML: 600; 310; 30; 20 INJECTION, SOLUTION INTRAVENOUS at 21:05

## 2024-07-16 RX ADMIN — SODIUM CITRATE AND CITRIC ACID MONOHYDRATE 30 ML: 334; 500 SOLUTION ORAL at 23:23

## 2024-07-16 RX ADMIN — SODIUM CHLORIDE, POTASSIUM CHLORIDE, SODIUM LACTATE AND CALCIUM CHLORIDE: 600; 310; 30; 20 INJECTION, SOLUTION INTRAVENOUS at 23:45

## 2024-07-16 RX ADMIN — SODIUM CHLORIDE, POTASSIUM CHLORIDE, SODIUM LACTATE AND CALCIUM CHLORIDE 125 ML/HR: 600; 310; 30; 20 INJECTION, SOLUTION INTRAVENOUS at 21:55

## 2024-07-16 RX ADMIN — ACETAMINOPHEN 1000 MG: 500 TABLET ORAL at 23:23

## 2024-07-16 RX ADMIN — FAMOTIDINE 20 MG: 10 INJECTION INTRAVENOUS at 23:22

## 2024-07-16 RX ADMIN — ONDANSETRON 4 MG: 2 INJECTION INTRAMUSCULAR; INTRAVENOUS at 23:23

## 2024-07-16 RX ADMIN — SODIUM CHLORIDE 3000 MG: 900 INJECTION INTRAVENOUS at 23:32

## 2024-07-16 NOTE — H&P
[facility]  Obstetric History and Physical    Chief complaint- abnormal testing on office ultrasound today with 4 out of 8 BPP and polyhydramnios.    Subjective     Patient is a 29 y.o. female  currently at 37w4d, who was sent from the office due to nonreassuring biophysical profile of 4 out of 8 and polyhydramnios.  Biophysical profile had 2 points off for tone and movement.  ELIZABETH was significantly elevated at 33.  Patient has a history of gestational hypertension.  Her blood pressure in the office today was borderline high at 132/88.  Pregnancy has been seen to be on the left with a bicornate uterus.  Patient ate at about 2 PM.    Her prenatal care is complicated by above along with obesity, anemia, bicornate uterus, Rh- status, large for gestational age and prior  section.  Baby was macrosomic.      Ultrasound at 35 weeks showed fetal weight greater than the 90th percentile and abdominal circumference greater than 97th percentile.  Estimated fetal weight currently is 9 pounds 11 ounces based on ultrasound 2 weeks ago at 8 pounds 11 ounces.  Patient's 1 hour glucose tolerance test was normal.    Her previous obstetric/gynecological history is noted for  history of gestational hypertension. .  Patient has a history of sleep apnea and depression.  Patient has a history of an LGA baby.    The following portions of the patients history were reviewed and updated as appropriate: past medical history, past surgical history, past family history, and problem list .       Prenatal Information:  Prenatal Results       Initial Prenatal Labs       Test Value Reference Range Date Time    Hemoglobin  11.8 g/dL 12.0 - 15.9 24 0850       13.4 g/dL 11.1 - 15.9 23 1344    Hematocrit  35.8 % 34.0 - 46.6 24 0850       41.5 % 34.0 - 46.6 23 1344    Platelets  312 10*3/mm3 140 - 450 24 0850       316 x10E3/uL 150 - 450 23 1344    Rubella IgG  3.31 index Immune >0.99 23 1344     Hepatitis B SAg  Negative  Negative 12/14/23 1344    Hepatitis C Ab  Non Reactive  Non Reactive 12/14/23 1344    RPR  Non Reactive  Non Reactive 05/07/24 0916       Non Reactive  Non Reactive 12/14/23 1344    T. Pallidum Ab         ABO  A   12/14/23 1344    Rh  Negative   12/14/23 1344    Antibody Screen  Negative  Negative 12/14/23 1344    HIV  Non Reactive  Non Reactive 12/14/23 1344    Urine Culture  50,000 CFU/mL Normal Urogenital Breanna   07/09/24 0838       50,000 CFU/mL Normal Urogenital Breanna   04/09/24 0737       50,000 CFU/mL Mixed Breanna Isolated   03/29/24 1452       Final report   12/01/23 1623    Gonorrhea  Negative  Negative 12/14/23 1453    Chlamydia  Negative  Negative 12/14/23 1453    TSH  2.290 uIU/mL 0.450 - 4.500 12/14/23 1344    HgB A1c   5.7 % 4.8 - 5.6 06/04/24 1137       5.8 % 4.8 - 5.6 12/14/23 1344    Varicella IgG  2,299 index Immune >165 12/14/23 1344    Hemoglobinopathy Fractionation  Comment   12/14/23 1344    Hemoglobinopathy (genetic testing)        Cystic fibrosis                   Fetal testing        Test Value Reference Range Date Time    NIPT        MSAFP        AFP-4                  2nd and 3rd Trimester       Test Value Reference Range Date Time    Hemoglobin (repeated)  10.9 g/dL 11.1 - 15.9 06/04/24 1137       11.5 g/dL 11.1 - 15.9 05/07/24 0916       11.8 g/dL 12.0 - 15.9 03/31/24 1819    Hematocrit (repeated)  33.8 % 34.0 - 46.6 06/04/24 1137       36.0 % 34.0 - 46.6 05/07/24 0916       35.6 % 34.0 - 46.6 03/31/24 1819    Platelets   279 x10E3/uL 150 - 450 06/04/24 1137       273 x10E3/uL 150 - 450 05/07/24 0916       310 10*3/mm3 140 - 450 03/31/24 1819       312 10*3/mm3 140 - 450 03/19/24 0850       316 x10E3/uL 150 - 450 12/14/23 1344    1 hour GTT   113 mg/dL 70 - 139 05/07/24 0916       107 mg/dL 65 - 139 03/19/24 0850    Antibody Screen (repeated)  Negative  Negative 05/07/24 0916    3rd TM syphilis scrn (repeated)  RPR   Non Reactive  Non Reactive 05/07/24 0916     3rd TM syphilis scrn (repeated) TP-Ab        3rd TM syphilis screen TB-Ab (FTA)        Syphilis cascade test TP-Ab (EIA)        Syphilis cascade TPPA        GTT Fasting        GTT 1 Hr        GTT 2 Hr        GTT 3 Hr        Group B Strep  Negative  Negative 07/02/24 1431              Other testing        Test Value Reference Range Date Time    Parvo IgG         CMV IgG                   Drug Screening       Test Value Reference Range Date Time    Amphetamine Screen        Barbiturate Screen        Benzodiazepine Screen        Methadone Screen        Phencyclidine Screen        Opiates Screen        THC Screen        Cocaine Screen        Propoxyphene Screen        Buprenorphine Screen        Methamphetamine Screen        Oxycodone Screen        Tricyclic Antidepressants Screen                  Legend    ^: Historical                          External Prenatal Results       Pregnancy Outside Results - Transcribed From Office Records - See Scanned Records For Details       Test Value Date Time    ABO  A  12/14/23 1344    Rh  Negative  12/14/23 1344    Antibody Screen  Negative  05/07/24 0916       Negative  12/14/23 1344    Varicella IgG  2,299 index 12/14/23 1344    Rubella  3.31 index 12/14/23 1344    Hgb  10.9 g/dL 06/04/24 1137       11.5 g/dL 05/07/24 0916       11.8 g/dL 03/31/24 1819       11.8 g/dL 03/19/24 0850       13.4 g/dL 12/14/23 1344    Hct  33.8 % 06/04/24 1137       36.0 % 05/07/24 0916       35.6 % 03/31/24 1819       35.8 % 03/19/24 0850       41.5 % 12/14/23 1344    HgB A1c   5.7 % 06/04/24 1137       5.8 % 12/14/23 1344    1h GTT  113 mg/dL 05/07/24 0916       107 mg/dL 03/19/24 0850    3h GTT Fasting       3h GTT 1 hour       3h GTT 2 hour       3h GTT 3 hour        Gonorrhea (discrete)  Negative  12/14/23 1453    Chlamydia (discrete)  Negative  12/14/23 1453    RPR  Non Reactive  05/07/24 0916       Non Reactive  12/14/23 1344    Syphilis Antibody       HBsAg  Negative  12/14/23 1344    Herpes  Simplex Virus PCR       Herpes Simplex VIrus Culture       HIV  Non Reactive  23 1344    Hep C RNA Quant PCR       Hep C Antibody  Non Reactive  23 1344    AFP       NIPT       Cystic Fibroisis        Group B Strep  Negative  24 1431    GBS Susceptibility to Clindamycin       GBS Susceptibility to Erythromycin       Fetal Fibronectin       Genetic Testing, Maternal Blood                 Drug Screening       Test Value Date Time    Urine Drug Screen       Amphetamine Screen       Barbiturate Screen       Benzodiazepine Screen       Methadone Screen       Phencyclidine Screen       Opiates Screen       THC Screen       Cocaine Screen       Propoxyphene Screen       Buprenorphine Screen       Methamphetamine Screen       Oxycodone Screen       Tricyclic Antidepressants Screen                 Legend    ^: Historical                             Past OB History:     OB History    Para Term  AB Living   4 1 1 0 2 1   SAB IAB Ectopic Molar Multiple Live Births   2 0 0 0 0 1      # Outcome Date GA Lbr Celestine/2nd Weight Sex Type Anes PTL Lv   4 Current            3 Term 20 38w3d  4080 g (8 lb 15.9 oz) M CS-LTranv Spinal N MIRIAM      Birth Comments: Alexei 1      Name: Manuelito       Apgar1: 8  Apgar5: 9   2 SAB 20 7w3d             Birth Comments: with D&C   1 SAB               Obstetric Comments   23 - LMP rejected - IUP at 6- 4 weeks - BRANDO 24 - F    3/19/24 - 20-4 weeks -normal completed anatomy, normal cervical length.-[facility]    24 - 24-4 weeks - EFW 70%, AC 96% - [facility]    24 - 27-4 weeks - EFW 86%, AC >97% - [facility]     24 - 31-4 weeks - EFW >90%, AC >97% - [facility]    24 - 35-4 weeks - EFW >90 % AC >97% -[facility]        Past Medical History: Past Medical History:   Diagnosis Date    Abnormal Pap smear of cervix     ADHD (attention deficit hyperactivity disorder)     never medicated    Anxiety     not medicated during pregnancy    Arthritis     Clinical diagnosis of COVID-19  2022    HEADACHE ONLY    Depression     not medicated during pregnancy    Gestational hypertension     HPV in female     Impingement syndrome of left shoulder     Miscarriage     Obesity 2008    Sleep apnea     cpap occasionally      Past Surgical History Past Surgical History:   Procedure Laterality Date    ADENOIDECTOMY      APPENDECTOMY N/A 2023    Procedure: APPENDECTOMY LAPAROSCOPIC;  Surgeon: Brandt Lemus Jr., MD;  Location: [facility];  Service: General;  Laterality: N/A;     SECTION N/A 2021    Procedure:  SECTION PRIMARY;  Surgeon: Yadi Barrera MD;  Location: [facility];  Service: Obstetrics/Gynecology;  Laterality: N/A;    D & C WITH SUCTION N/A 2020    Procedure: DILATATION AND CURETTAGE WITH SUCTION;  Surgeon: Yadi Barrera MD;  Location: [facility] OR;  Service: Obstetrics/Gynecology;  Laterality: N/A;    D & C WITH SUCTION N/A 2023    Procedure: DILATATION AND CURETTAGE WITH SUCTION;  Surgeon: Yadi Barrera MD;  Location: [facility] OR;  Service: Obstetrics/Gynecology;  Laterality: N/A;    EAR TUBES      JOINT MANIPULATION Left 2022    Procedure: LEFT SHOULDER MANIPULATION;  Surgeon: Navid Hinds MD;  Location: [facility];  Service: Orthopedics;  Laterality: Left;    LAPAROSCOPIC CHOLECYSTECTOMY      SHOULDER ARTHROSCOPY W/ SUPERIOR LABRAL ANTERIOR POSTERIOR REPAIR Left 2022    Procedure: LEFT SHOULDER ARTHROSCOPY LBARAL REPAIR WITH DISTAL CLAVICLE EXCISION AND SUBACROMIAL DECOMPRESSION;  Surgeon: Navid Hinds MD;  Location: [facility];  Service: Orthopedics;  Laterality: Left;    TONSILLECTOMY      WISDOM TOOTH EXTRACTION        Family History: Family History   Problem Relation Age of Onset    Thyroid disease Mother     Depression Mother     Anxiety disorder Mother     Miscarriages / Stillbirths Mother     Vision loss Mother     Hypertension Father     Hyperlipidemia Father     Cancer  Maternal Grandmother     Pancreatic cancer Maternal Grandmother     Pancreatitis Maternal Grandmother     No Known Problems Brother     No Known Problems Sister     Diabetes Maternal Grandfather     Ovarian cancer Other     Malig Hyperthermia Neg Hx     Breast cancer Neg Hx     Uterine cancer Neg Hx     Colon cancer Neg Hx       Social History:  reports that she has never smoked. She has never been exposed to tobacco smoke. She has never used smokeless tobacco.   reports no history of alcohol use.   reports no history of drug use.        General ROS:  Patient notes that she does feel fetal movement but has a difficult time feeling movement unless she is lying down.  She notes it has been this way throughout the pregnancy.  No vaginal bleeding or contractions.    Objective       Vital Signs Range for the last 24 hours  Temperature:     Temp Source:     BP: BP: (132)/(88) 132/88   Pulse:     Respirations:     SPO2:     O2 Amount (l/min):     O2 Devices     Weight: Weight:  [155 kg (341 lb)] 155 kg (341 lb)     Physical Examination: General appearance - alert, well appearing, and in no distress  Mental status - normal mood, behavior, speech, dress, motor activity, and thought processes  Eyes - sclera anicteric  Abdomen -obese with BMI of 50.  Gravid, size greater than dates    Presentation: vertex   Cervix: Exam by:     Dilation:     Effacement:     Station:         Fetal Heart Rate Assessment -category 1  Method:     Beats/min:     Baseline:     Variability:     Accels:     Decels:     Tracing Category:       Uterine Assessment   Method:     Frequency (min):     Ctx Count in 10 min:     Duration:     Intensity:     Intensity by IUPC:     Resting Tone:     Resting Tone by IUPC:     [place] Units:         Assessment & Plan       Polyhydramnios in third trimester    Bicornuate uterus    Rh negative status during pregnancy    Obesity in pregnancy, antepartum    Previous  delivery affecting pregnancy    Request  for sterilization    Maternal anemia in pregnancy, antepartum    History of gestational hypertension    LGA (large for gestational age) fetus affecting management of mother    Pregnancy    Abnormal fetal ultrasound        Assessment:  1.  Intrauterine pregnancy at 37w4d gestation with reactive, reassuring fetal status.    2.  Abnormal fetal testing, BPP is 4 out of 8,  Polyhydramnios, macrosomia, prior  section and desire for repeat  section and desire for tubal ligation.  3.  Obstetrical history significant for  and bicornuate uterus. .  4.  GBS status:   Strep Gp B Culture   Date Value Ref Range Status   2024 Negative Negative Final     Comment:     Centers for Disease Control and Prevention (CDC) and American Congress  of Obstetricians and Gynecologists (ACOG) guidelines for prevention of   group B streptococcal (GBS) disease specify co-collection of  a vaginal and rectal swab specimen to maximize sensitivity of GBS  detection. Per the CDC and ACOG, swabbing both the lower vagina and  rectum substantially increases the yield of detection compared with  sampling the vagina alone.  Penicillin G, ampicillin, or cefazolin are indicated for intrapartum  prophylaxis of  GBS colonization. Reflex susceptibility  testing should be performed prior to use of clindamycin only on GBS  isolates from penicillin-allergic women who are considered a high risk  for anaphylaxis. Treatment with vancomycin without additional testing  is warranted if resistance to clindamycin is noted.         Plan:  1.  with Tubal scheduled  will be done at 10 PM tonight due to the patient eating at 2 PM.  2. Plan of care has been reviewed with patient and family  3.  Risks, benefits of treatment plan have been discussed.  4.  All questions have been answered.        Aryan Myers MD  2024  17:20 EDT

## 2024-07-16 NOTE — PROGRESS NOTES
Chief Complaint   Patient presents with    Routine Prenatal Visit     Cc: ob check with BPP Us today , GBS neg , doing ok  c/o back pain cramping and lots of vaginal  pressure , did not leave a urine sample .       HPI: 29 y.o.  at 37w4d presents for prenatal care   Last OB US growth (since 2024)         Value Time User    EFW%ILE  >90%ile 2024  1:13 PM Yadi Barrera MD    AC%ILE  >97%ile 2024  1:13 PM Yadi Barrera MD    FETAL SURVEY  NL/Complete 3/19/2024  9:05 AM Yadi Barrera MD          Vitals:    24 1543   BP: 132/88   Weight: (!) 155 kg (341 lb)     Total weight gain for pregnancy:  18.6 kg (41 lb)    Review of systems:   Gen: negative  CV:     negative  GI: negative  :   good fetal movement noted , humble brownlee type contractions, and pelvic pressure  MS:    back pain   Neuro: negative  Pul: negative    Physical Exam  Vitals and nursing note reviewed.   Constitutional:       Appearance: Normal appearance. She is obese.   Pulmonary:      Effort: Pulmonary effort is normal.   Neurological:      Mental Status: She is alert.   Psychiatric:         Mood and Affect: Mood normal.         Thought Content: Thought content normal.         Judgment: Judgment normal.           A/P  1. Intrauterine pregnancy at 37w4d   2. Pregnancy Risk:  HIGH RISK  Blood pressure is out of parameters.    Diagnoses and all orders for this visit:    1. Prenatal care, subsequent pregnancy in third trimester (Primary)    2. Screening for blood or protein in urine  -     Cancel: POC Urinalysis Dipstick    3. Elevated hemoglobin A1c    4. Request for sterilization    5. History of gestational hypertension    6. Excessive fetal growth affecting management of pregnancy in third trimester, single or unspecified fetus    7. Maternal anemia in pregnancy, antepartum    8. Rh negative status during pregnancy in third trimester    9. Polyhydramnios in third trimester complication, single or unspecified  fetus    10. Previous  delivery affecting pregnancy    11. Obesity in pregnancy, antepartum          -----------------------  PLAN:   Return in about 3 weeks (around 2024), or PP incision check.  PPD today 4 out of 8 with ELIZABETH of 33 cm    To labor and delivery for evaluation and likely delivery  Asked with on-call physician, Dr. Lucia Barrera MD  2024 16:33 EDT

## 2024-07-17 LAB
BASOPHILS # BLD AUTO: 0.03 10*3/MM3 (ref 0–0.2)
BASOPHILS NFR BLD AUTO: 0.2 % (ref 0–1.5)
DEPRECATED RDW RBC AUTO: 42.9 FL (ref 37–54)
EOSINOPHIL # BLD AUTO: 0.05 10*3/MM3 (ref 0–0.4)
EOSINOPHIL NFR BLD AUTO: 0.3 % (ref 0.3–6.2)
ERYTHROCYTE [DISTWIDTH] IN BLOOD BY AUTOMATED COUNT: 13.6 % (ref 12.3–15.4)
HCT VFR BLD AUTO: 35.7 % (ref 34–46.6)
HGB BLD-MCNC: 11.6 G/DL (ref 12–15.9)
IMM GRANULOCYTES # BLD AUTO: 0.15 10*3/MM3 (ref 0–0.05)
IMM GRANULOCYTES NFR BLD AUTO: 0.9 % (ref 0–0.5)
LYMPHOCYTES # BLD AUTO: 2.11 10*3/MM3 (ref 0.7–3.1)
LYMPHOCYTES NFR BLD AUTO: 12.9 % (ref 19.6–45.3)
MCH RBC QN AUTO: 28.2 PG (ref 26.6–33)
MCHC RBC AUTO-ENTMCNC: 32.5 G/DL (ref 31.5–35.7)
MCV RBC AUTO: 86.7 FL (ref 79–97)
MONOCYTES # BLD AUTO: 0.87 10*3/MM3 (ref 0.1–0.9)
MONOCYTES NFR BLD AUTO: 5.3 % (ref 5–12)
NEUTROPHILS NFR BLD AUTO: 13.15 10*3/MM3 (ref 1.7–7)
NEUTROPHILS NFR BLD AUTO: 80.4 % (ref 42.7–76)
NRBC BLD AUTO-RTO: 0 /100 WBC (ref 0–0.2)
PLATELET # BLD AUTO: 230 10*3/MM3 (ref 140–450)
PMV BLD AUTO: 9.1 FL (ref 6–12)
RBC # BLD AUTO: 4.12 10*6/MM3 (ref 3.77–5.28)
WBC NRBC COR # BLD AUTO: 16.36 10*3/MM3 (ref 3.4–10.8)

## 2024-07-17 PROCEDURE — 25010000002 KETOROLAC TROMETHAMINE PER 15 MG: Performed by: OBSTETRICS & GYNECOLOGY

## 2024-07-17 PROCEDURE — 25810000003 SODIUM CHLORIDE 0.9 % SOLUTION: Performed by: OBSTETRICS & GYNECOLOGY

## 2024-07-17 PROCEDURE — 25010000002 AZITHROMYCIN PER 500 MG: Performed by: OBSTETRICS & GYNECOLOGY

## 2024-07-17 PROCEDURE — 25010000002 CLONIDINE PER 1 MG: Performed by: OBSTETRICS & GYNECOLOGY

## 2024-07-17 PROCEDURE — 88302 TISSUE EXAM BY PATHOLOGIST: CPT | Performed by: OBSTETRICS & GYNECOLOGY

## 2024-07-17 PROCEDURE — 85025 COMPLETE CBC W/AUTO DIFF WBC: CPT | Performed by: OBSTETRICS & GYNECOLOGY

## 2024-07-17 PROCEDURE — 88307 TISSUE EXAM BY PATHOLOGIST: CPT

## 2024-07-17 PROCEDURE — 25810000003 SODIUM CHLORIDE 0.9 % SOLUTION 250 ML FLEX CONT: Performed by: OBSTETRICS & GYNECOLOGY

## 2024-07-17 PROCEDURE — 59510 CESAREAN DELIVERY: CPT | Performed by: OBSTETRICS & GYNECOLOGY

## 2024-07-17 PROCEDURE — 25010000002 MORPHINE PER 10 MG: Performed by: ANESTHESIOLOGY

## 2024-07-17 PROCEDURE — 25010000002 BUPIVACAINE PF 0.75 % SOLUTION: Performed by: ANESTHESIOLOGY

## 2024-07-17 PROCEDURE — 25010000002 OXYTOCIN PER 10 UNITS: Performed by: OBSTETRICS & GYNECOLOGY

## 2024-07-17 PROCEDURE — 0UB70ZZ EXCISION OF BILATERAL FALLOPIAN TUBES, OPEN APPROACH: ICD-10-PCS | Performed by: OBSTETRICS & GYNECOLOGY

## 2024-07-17 PROCEDURE — 25010000002 EPINEPHRINE 1 MG/ML SOLUTION 30 ML VIAL: Performed by: OBSTETRICS & GYNECOLOGY

## 2024-07-17 PROCEDURE — 25010000002 FENTANYL CITRATE (PF) 50 MCG/ML SOLUTION: Performed by: ANESTHESIOLOGY

## 2024-07-17 PROCEDURE — 25010000002 ROPIVACAINE PER 1 MG: Performed by: OBSTETRICS & GYNECOLOGY

## 2024-07-17 RX ORDER — OXYCODONE HYDROCHLORIDE 5 MG/1
5 TABLET ORAL EVERY 4 HOURS PRN
Status: DISCONTINUED | OUTPATIENT
Start: 2024-07-17 | End: 2024-07-21 | Stop reason: HOSPADM

## 2024-07-17 RX ORDER — EPHEDRINE SULFATE 50 MG/ML
INJECTION INTRAVENOUS AS NEEDED
Status: DISCONTINUED | OUTPATIENT
Start: 2024-07-17 | End: 2024-07-17 | Stop reason: SURG

## 2024-07-17 RX ORDER — MORPHINE SULFATE 2 MG/ML
2 INJECTION, SOLUTION INTRAMUSCULAR; INTRAVENOUS
Status: ACTIVE | OUTPATIENT
Start: 2024-07-17 | End: 2024-07-17

## 2024-07-17 RX ORDER — DIPHENHYDRAMINE HYDROCHLORIDE 50 MG/ML
25 INJECTION INTRAMUSCULAR; INTRAVENOUS EVERY 4 HOURS PRN
Status: DISCONTINUED | OUTPATIENT
Start: 2024-07-17 | End: 2024-07-21 | Stop reason: HOSPADM

## 2024-07-17 RX ORDER — DOCUSATE SODIUM 100 MG/1
100 CAPSULE, LIQUID FILLED ORAL 2 TIMES DAILY
Status: DISCONTINUED | OUTPATIENT
Start: 2024-07-17 | End: 2024-07-21 | Stop reason: HOSPADM

## 2024-07-17 RX ORDER — MORPHINE SULFATE 4 MG/ML
INJECTION, SOLUTION INTRAMUSCULAR; INTRAVENOUS
Status: COMPLETED | OUTPATIENT
Start: 2024-07-17 | End: 2024-07-17

## 2024-07-17 RX ORDER — HYDROMORPHONE HYDROCHLORIDE 1 MG/ML
0.5 INJECTION, SOLUTION INTRAMUSCULAR; INTRAVENOUS; SUBCUTANEOUS
Status: CANCELLED | OUTPATIENT
Start: 2024-07-17 | End: 2024-07-17

## 2024-07-17 RX ORDER — IBUPROFEN 600 MG/1
600 TABLET ORAL EVERY 6 HOURS
Status: DISCONTINUED | OUTPATIENT
Start: 2024-07-18 | End: 2024-07-21 | Stop reason: HOSPADM

## 2024-07-17 RX ORDER — SIMETHICONE 80 MG
80 TABLET,CHEWABLE ORAL
Status: DISCONTINUED | OUTPATIENT
Start: 2024-07-17 | End: 2024-07-21 | Stop reason: HOSPADM

## 2024-07-17 RX ORDER — NALOXONE HCL 0.4 MG/ML
0.2 VIAL (ML) INJECTION
Status: CANCELLED | OUTPATIENT
Start: 2024-07-17

## 2024-07-17 RX ORDER — OXYTOCIN/0.9 % SODIUM CHLORIDE 30/500 ML
125 PLASTIC BAG, INJECTION (ML) INTRAVENOUS ONCE AS NEEDED
Status: COMPLETED | OUTPATIENT
Start: 2024-07-17 | End: 2024-07-17

## 2024-07-17 RX ORDER — OXYCODONE HYDROCHLORIDE 10 MG/1
10 TABLET ORAL EVERY 4 HOURS PRN
Status: DISCONTINUED | OUTPATIENT
Start: 2024-07-17 | End: 2024-07-21 | Stop reason: HOSPADM

## 2024-07-17 RX ORDER — HYDROCORTISONE 25 MG/G
CREAM TOPICAL 3 TIMES DAILY PRN
Status: DISCONTINUED | OUTPATIENT
Start: 2024-07-17 | End: 2024-07-21 | Stop reason: HOSPADM

## 2024-07-17 RX ORDER — ACETAMINOPHEN 325 MG/1
650 TABLET ORAL EVERY 6 HOURS
Status: DISCONTINUED | OUTPATIENT
Start: 2024-07-18 | End: 2024-07-21 | Stop reason: HOSPADM

## 2024-07-17 RX ORDER — FENTANYL CITRATE 50 UG/ML
INJECTION, SOLUTION INTRAMUSCULAR; INTRAVENOUS
Status: COMPLETED | OUTPATIENT
Start: 2024-07-17 | End: 2024-07-17

## 2024-07-17 RX ORDER — KETOROLAC TROMETHAMINE 15 MG/ML
15 INJECTION, SOLUTION INTRAMUSCULAR; INTRAVENOUS EVERY 6 HOURS
Status: COMPLETED | OUTPATIENT
Start: 2024-07-17 | End: 2024-07-18

## 2024-07-17 RX ORDER — SODIUM CHLORIDE, SODIUM LACTATE, POTASSIUM CHLORIDE, CALCIUM CHLORIDE 600; 310; 30; 20 MG/100ML; MG/100ML; MG/100ML; MG/100ML
INJECTION, SOLUTION INTRAVENOUS CONTINUOUS PRN
Status: DISCONTINUED | OUTPATIENT
Start: 2024-07-16 | End: 2024-07-17 | Stop reason: SURG

## 2024-07-17 RX ORDER — ACETAMINOPHEN 500 MG
1000 TABLET ORAL EVERY 6 HOURS
Status: COMPLETED | OUTPATIENT
Start: 2024-07-17 | End: 2024-07-18

## 2024-07-17 RX ORDER — PHENYLEPHRINE HCL IN 0.9% NACL 1 MG/10 ML
SYRINGE (ML) INTRAVENOUS AS NEEDED
Status: DISCONTINUED | OUTPATIENT
Start: 2024-07-17 | End: 2024-07-17 | Stop reason: SURG

## 2024-07-17 RX ORDER — DROPERIDOL 2.5 MG/ML
0.62 INJECTION, SOLUTION INTRAMUSCULAR; INTRAVENOUS
Status: DISCONTINUED | OUTPATIENT
Start: 2024-07-17 | End: 2024-07-21 | Stop reason: HOSPADM

## 2024-07-17 RX ORDER — ONDANSETRON 2 MG/ML
4 INJECTION INTRAMUSCULAR; INTRAVENOUS ONCE AS NEEDED
Status: DISCONTINUED | OUTPATIENT
Start: 2024-07-17 | End: 2024-07-21 | Stop reason: HOSPADM

## 2024-07-17 RX ORDER — ENOXAPARIN SODIUM 100 MG/ML
40 INJECTION SUBCUTANEOUS EVERY 12 HOURS
Status: DISCONTINUED | OUTPATIENT
Start: 2024-07-18 | End: 2024-07-21 | Stop reason: HOSPADM

## 2024-07-17 RX ORDER — BUPIVACAINE HYDROCHLORIDE 7.5 MG/ML
INJECTION, SOLUTION EPIDURAL; RETROBULBAR
Status: COMPLETED | OUTPATIENT
Start: 2024-07-17 | End: 2024-07-17

## 2024-07-17 RX ORDER — ONDANSETRON 2 MG/ML
4 INJECTION INTRAMUSCULAR; INTRAVENOUS EVERY 6 HOURS PRN
Status: DISCONTINUED | OUTPATIENT
Start: 2024-07-17 | End: 2024-07-21 | Stop reason: HOSPADM

## 2024-07-17 RX ORDER — HYDROXYZINE 50 MG/1
50 TABLET, FILM COATED ORAL EVERY 6 HOURS PRN
Status: DISCONTINUED | OUTPATIENT
Start: 2024-07-17 | End: 2024-07-21 | Stop reason: HOSPADM

## 2024-07-17 RX ORDER — METFORMIN HYDROCHLORIDE 500 MG/1
1000 TABLET, EXTENDED RELEASE ORAL
Status: DISCONTINUED | OUTPATIENT
Start: 2024-07-17 | End: 2024-07-21 | Stop reason: HOSPADM

## 2024-07-17 RX ORDER — DIPHENHYDRAMINE HCL 25 MG
25 CAPSULE ORAL EVERY 4 HOURS PRN
Status: DISCONTINUED | OUTPATIENT
Start: 2024-07-17 | End: 2024-07-21 | Stop reason: HOSPADM

## 2024-07-17 RX ADMIN — SIMETHICONE 80 MG: 80 TABLET, CHEWABLE ORAL at 11:52

## 2024-07-17 RX ADMIN — CLONIDINE HYDROCHLORIDE 100 ML: 0.1 INJECTION, SOLUTION EPIDURAL at 01:04

## 2024-07-17 RX ADMIN — MORPHINE SULFATE 100 MCG: 4 INJECTION, SOLUTION INTRAMUSCULAR; INTRAVENOUS at 00:02

## 2024-07-17 RX ADMIN — Medication 100 MCG: at 01:04

## 2024-07-17 RX ADMIN — AZITHROMYCIN MONOHYDRATE 500 MG: 500 INJECTION, POWDER, LYOPHILIZED, FOR SOLUTION INTRAVENOUS at 00:14

## 2024-07-17 RX ADMIN — ACETAMINOPHEN 1000 MG: 500 TABLET ORAL at 12:48

## 2024-07-17 RX ADMIN — BUPIVACAINE HYDROCHLORIDE 1.8 ML: 7.5 INJECTION, SOLUTION EPIDURAL; RETROBULBAR at 00:02

## 2024-07-17 RX ADMIN — SERTRALINE 50 MG: 50 TABLET, FILM COATED ORAL at 08:22

## 2024-07-17 RX ADMIN — FENTANYL CITRATE 15 MCG: 50 INJECTION, SOLUTION INTRAMUSCULAR; INTRAVENOUS at 00:02

## 2024-07-17 RX ADMIN — Medication 100 MCG: at 00:19

## 2024-07-17 RX ADMIN — KETOROLAC TROMETHAMINE 30 MG: 30 INJECTION, SOLUTION INTRAMUSCULAR at 01:04

## 2024-07-17 RX ADMIN — SIMETHICONE 80 MG: 80 TABLET, CHEWABLE ORAL at 18:25

## 2024-07-17 RX ADMIN — SIMETHICONE 80 MG: 80 TABLET, CHEWABLE ORAL at 08:22

## 2024-07-17 RX ADMIN — METFORMIN HYDROCHLORIDE 1000 MG: 500 TABLET, EXTENDED RELEASE ORAL at 08:23

## 2024-07-17 RX ADMIN — Medication 999 ML/HR: at 00:27

## 2024-07-17 RX ADMIN — KETOROLAC TROMETHAMINE 15 MG: 15 INJECTION, SOLUTION INTRAMUSCULAR; INTRAVENOUS at 10:07

## 2024-07-17 RX ADMIN — DOCUSATE SODIUM 100 MG: 100 CAPSULE, LIQUID FILLED ORAL at 08:23

## 2024-07-17 RX ADMIN — Medication 100 MCG: at 00:22

## 2024-07-17 RX ADMIN — EPHEDRINE SULFATE 5 MG: 50 INJECTION INTRAVENOUS at 00:22

## 2024-07-17 RX ADMIN — DOCUSATE SODIUM 100 MG: 100 CAPSULE, LIQUID FILLED ORAL at 22:11

## 2024-07-17 RX ADMIN — KETOROLAC TROMETHAMINE 15 MG: 15 INJECTION, SOLUTION INTRAMUSCULAR; INTRAVENOUS at 16:35

## 2024-07-17 RX ADMIN — KETOROLAC TROMETHAMINE 15 MG: 15 INJECTION, SOLUTION INTRAMUSCULAR; INTRAVENOUS at 22:11

## 2024-07-17 RX ADMIN — Medication 100 MCG: at 00:10

## 2024-07-17 RX ADMIN — OXYTOCIN 125 ML/HR: 10 INJECTION INTRAVENOUS at 02:31

## 2024-07-17 RX ADMIN — ACETAMINOPHEN 1000 MG: 500 TABLET ORAL at 18:25

## 2024-07-17 RX ADMIN — SIMETHICONE 80 MG: 80 TABLET, CHEWABLE ORAL at 22:11

## 2024-07-17 RX ADMIN — Medication 100 MCG: at 00:04

## 2024-07-17 RX ADMIN — ACETAMINOPHEN 1000 MG: 500 TABLET ORAL at 07:07

## 2024-07-17 NOTE — ANESTHESIA PROCEDURE NOTES
Spinal Block      Patient location during procedure: OB  Start Time: 7/17/2024 11:55 PM  Stop Time: 7/17/2024 12:02 AM  Indication:at surgeon's request, post-op pain management and procedure for pain  Performed By  Anesthesiologist: Nandini Giles MD  CRNA/CAA: Norris Duran CRNA  Preanesthetic Checklist  Completed: patient identified, IV checked, site marked, risks and benefits discussed, surgical consent, monitors and equipment checked, pre-op evaluation and timeout performed  Spinal Block Prep:  Patient Position:sitting  Sterile Tech:cap, gloves, gown, mask and sterile barriers  Prep:Chloraprep  Patient Monitoring:blood pressure monitoring, continuous pulse oximetry and EKG    Spinal Block Procedure  Approach:midline  Guidance:landmark technique and palpation technique  Location:L3-L4  Needle Type:Pencan  Needle Gauge:24 G  Placement of Spinal needle event:cerebrospinal fluid aspirated  Paresthesia: no  Fluid Appearance:clear  Medications: fentaNYL citrate (PF) (SUBLIMAZE) injection - Intrathecal   15 mcg - 7/17/2024 12:02:00 AM  Morphine sulfate (PF) injection - Spinal   100 mcg - 7/17/2024 12:02:00 AM  bupivacaine PF (MARCAINE) 0.75 % injection - Spinal   1.8 mL - 7/17/2024 12:02:00 AM   Post Assessment  Patient Tolerance:patient tolerated the procedure well with no apparent complications  Complications no

## 2024-07-17 NOTE — ANESTHESIA POSTPROCEDURE EVALUATION
"Patient: Davida Quinonez    Procedure Summary       Date: 24 Room / Location:  [facility]    Anesthesia Start:  Anesthesia Stop: 24    Procedure:  SECTION REPEAT WITH TUBAL (Bilateral: Abdomen) Diagnosis:       Bicornuate uterus      Obesity in pregnancy, antepartum      Previous  delivery affecting pregnancy      Rh negative status during pregnancy in second trimester      Maternal anemia in pregnancy, antepartum      Request for sterilization      (Bicornuate uterus [Q51.3])      (Obesity in pregnancy, antepartum [O99.210])      (Previous  delivery affecting pregnancy [O34.219])      (Rh negative status during pregnancy in second trimester [O26.892, Z67.91])      (Maternal anemia in pregnancy, antepartum [O99.019])      (Request for sterilization [Z30.2])    Surgeons: Aryan Myers MD Provider: Nandini Giles MD    Anesthesia Type: spinal ASA Status: 3            Anesthesia Type: spinal    Vitals  Vitals Value Taken Time   /59 24 0215   Temp 36.5 °C (97.7 °F) 24 0145   Pulse 102 24 0224   Resp 16 24 0215   SpO2 99 % 24   Vitals shown include unfiled device data.        Post Anesthesia Care and Evaluation    Patient location during evaluation: bedside  Patient participation: complete - patient participated  Level of consciousness: awake  Pain management: adequate    Airway patency: patent  Anesthetic complications: No anesthetic complications  PONV Status: controlled  Cardiovascular status: acceptable  Respiratory status: acceptable  Hydration status: acceptable    Comments: /59   Pulse 90   Temp 36.5 °C (97.7 °F)   Resp 16   Ht 175.3 cm (69\")   Wt (!) 155 kg (341 lb)   LMP 10/19/2023 (Exact Date)   SpO2 99%   Breastfeeding No   BMI 50.36 kg/m²       "

## 2024-07-17 NOTE — ANESTHESIA PREPROCEDURE EVALUATION
" Anesthesia Evaluation     Patient summary reviewed and Nursing notes reviewed   no history of anesthetic complications:   NPO Solid Status: > 8 hours  NPO Liquid Status: > 2 hours           Airway   Mallampati: II  Dental      Pulmonary    (+) ,sleep apnea  Cardiovascular   Exercise tolerance: good (4-7 METS)    (+) hypertension      Neuro/Psych  (+) psychiatric history  GI/Hepatic/Renal/Endo    (+) obesity, morbid obesity    ROS Comment: pcos    Musculoskeletal     Abdominal   (+) obese   Substance History - negative use     OB/GYN    (+) Pregnant, pregnancy induced hypertension        Other   arthritis,                 Anesthesia Plan    ASA 3     spinal     (  37w4d    /77   Pulse 105   Temp 36.7 °C (98 °F) (Oral)   Resp 16   Ht 175.3 cm (69\")   Wt (!) 155 kg (341 lb)   LMP 10/19/2023 (Exact Date)   SpO2 99%   Breastfeeding No   BMI 50.36 kg/m²     I have reviewed the patient's history with the patient and the chart, including all pertinent laboratory results and imaging. I have explained the risks of anesthesia including but not limited to dental damage, corneal abrasion, nerve injury, MI, stroke, and death.    )    Anesthetic plan, risks, benefits, and alternatives have been provided, discussed and informed consent has been obtained with: patient.    CODE STATUS:    Level Of Support Discussed With: Patient  Code Status (Patient has no pulse and is not breathing): CPR (Attempt to Resuscitate)  Medical Interventions (Patient has pulse or is breathing): Full Support      "

## 2024-07-17 NOTE — OP NOTE
Operative Note    Preoperative diagnosis: 29-year-old  4 para 1-0-2-1 at 37-4/7 weeks with polyhydramnios, nonreassuring biophysical profile of 4 out of 8, prior  section and desire for repeat  section and desire for permanent sterilization.    Postoperative diagnosis: Same    Procedure: Repeat  section and bilateral salpingectomy    Surgeon: Dr. Myers    Assistant: Dr. Badillo    Anesthesia: Spinal    Findings: Viable male infant weighing  10 Pounds 0 ounces, Apgar of  8 and 9 , amniotic fluid was clear.  Placenta was complete with a three-vessel cord.  Ovaries and fallopian tubes appear normal    Quantitative blood loss:   Please see epic record    Specimens: placenta     Medications: Preoperative antibiotics were given-3 g of Ancef and 500 mg of Zithromax IV    Description of the procedure: Patient was taken to the to the operating room where spinal anesthesia was administered.  The patient was then placed in the dorsal supine position.  Traxi was used to elevate the patient's panniculus.  The patient was prepped and draped in the normal sterile fashion and a Bates catheter was sterilely placed.  Timeout was completed.  Skin was checked for adequate anesthesia.    A Pfannenstiel skin incision was made with the knife and carried down to the level of the fascia.  The fascial incision was extended bilaterally with the Solano scissors.  The fascia was then grasped superiorly and inferiorly and both bluntly and sharply dissected away from the underlying rectus muscles.  The rectus muscles were  in the midline and the peritoneum was tented up and entered bluntly.  Peritoneal incision was extended superiorly and inferiorly with good visualization of the bladder.  The bladder blade was placed and the bladder flap was created.  The bladder blade was placed behind the bladder flap.  A low transverse uterine incision was made and extended with the bandage scissors.  The fetal head was  gently delivered into the incision.  The baby shoulders and body were gently delivered.  The nares and mouth were bulb suctioned.  After 30 seconds of cord pulsations the cord was clamped and cut.  Baby was taken to the warmer for evaluation.  Cord blood was collected.  The placenta was delivered and noted to be complete.    Uterus was then cleared of all clots.  The corners and lower edge of the uterine incision were grasped with the Allis clamps.  The uterus was closed in 2 layers of 0 Vicryl.  The first layer was a running locking layer.  The second layer was an imbricating layer.  Excellent hemostasis was noted.  Both ovaries were inspected and noted to be normal.  The left fallopian tube was elevated with a Belmont clamp at the fimbriated end and the Enseal device was used to coagulate and transect under the fallopian tube.  Dissection was taken down to proximately the midportion of the fallopian tube due to large vessels and the mesosalpinx.  The tube was then excised and sent to pathology.  No similar fashion the right fallopian tube was elevated and the Enseal device was used to coagulate and transect underneath the fallopian tube.  The tube was excised and sent to pathology.  The uterine incision was reexamined and no bleeding was seen.  Peritoneum was then closed with 3-0 Vicryl.  The rectus muscles were inspected and small areas of bleeding were bovied.  The fascia was closed with 0 Vicryl.  Subcutaneous tissues were irrigated and small areas of bleeding were bovied.  Subcutaneous tissues were closed with 3-0 Vicryl.  The skin was closed with 4-0 Monocryl.  Dermabond was placed over the incision.  Sterile dressing was placed.  All lap and instrument counts were correct.    Surgical assistant was responsible for performing the following activities: Retraction, suction, irrigation, and aiding with delivery of the baby. This skilled assistance was necessary for the success of this case.    Aryan Myers M.D.

## 2024-07-17 NOTE — PROGRESS NOTES
[facility]   PROGRESS NOTE    Post-Op Day 1 S/P   Subjective     Patient reports:  Pain is well controlled.She is ambulating. Tolerating diet. Tolerating po -- normal.  Intake -- c/o of tolerating po solids. Vaginal bleeding is as much as expected. Her baby is currently stable but in NICU.     ROS:  Neuro: neg for severe headache  Pulm: neg for soa  CV: neg for chest pain  : neg for heavy bleeding  Musculoskeletal: neg for leg pain    Objective      Vitals: Vital Signs Range for the last 24 hours  Temperature: Temp:  [97.7 °F (36.5 °C)-98.2 °F (36.8 °C)] 97.9 °F (36.6 °C)   Temp Source: Temp src: Oral   BP: BP: ()/(45-88) 114/74   Pulse: Heart Rate:  [] 84   Respirations: Resp:  [16-20] 16   SPO2: SpO2:  [96 %-100 %] 100 %   O2 Amount (l/min):     O2 Devices Device (Oxygen Therapy): room air   Weight: Weight:  [155 kg (341 lb)] 155 kg (341 lb)            Physical Exam    Lungs clear to auscultation bilaterally   Abdomen Soft, fundus firm at U-1   Incision  no drainage, no erythema, no swelling, well approximated   Extremities Bilateral lower ext with trace edema; no cords or tenderness; SCD's in place     Labs:  Lab Results   Component Value Date    WBC 16.36 (H) 2024    HGB 11.6 (L) 2024    HCT 35.7 2024    MCV 86.7 2024     2024     Results from last 7 days   Lab Units 24  1757   ABO TYPING  A   RH TYPING  Negative       Assessment & Plan        Polyhydramnios in third trimester    Bicornuate uterus    Rh negative status during pregnancy    Obesity in pregnancy, antepartum    Previous  delivery affecting pregnancy    Request for sterilization    Maternal anemia in pregnancy, antepartum    History of gestational hypertension    LGA (large for gestational age) fetus affecting management of mother    Pregnancy    Abnormal fetal ultrasound     delivery delivered      Assessment:    Davida Quinonez is Day 1  post-partum   , Low Transverse  : pt is stable this AM    Rh neg: baby is Rh neg     Increased BMI: ambulation encouraged, SCD's in place currently, lovenox will start early AM tomorrow     Plan:  remove urine catheter, continue post op care, and ambulation encouraged .        Rosemarie Hernandez MD  2024  10:56 EDT

## 2024-07-18 LAB
LAB AP CASE REPORT: NORMAL
PATH REPORT.FINAL DX SPEC: NORMAL
PATH REPORT.GROSS SPEC: NORMAL

## 2024-07-18 PROCEDURE — 25010000002 ENOXAPARIN PER 10 MG: Performed by: OBSTETRICS & GYNECOLOGY

## 2024-07-18 PROCEDURE — 25010000002 KETOROLAC TROMETHAMINE PER 15 MG: Performed by: OBSTETRICS & GYNECOLOGY

## 2024-07-18 RX ADMIN — IBUPROFEN 600 MG: 600 TABLET, FILM COATED ORAL at 17:24

## 2024-07-18 RX ADMIN — ENOXAPARIN SODIUM 40 MG: 100 INJECTION SUBCUTANEOUS at 14:01

## 2024-07-18 RX ADMIN — METFORMIN HYDROCHLORIDE 1000 MG: 500 TABLET, EXTENDED RELEASE ORAL at 08:05

## 2024-07-18 RX ADMIN — ACETAMINOPHEN 325MG 650 MG: 325 TABLET ORAL at 14:01

## 2024-07-18 RX ADMIN — SIMETHICONE 80 MG: 80 TABLET, CHEWABLE ORAL at 08:05

## 2024-07-18 RX ADMIN — ENOXAPARIN SODIUM 40 MG: 100 INJECTION SUBCUTANEOUS at 02:05

## 2024-07-18 RX ADMIN — SIMETHICONE 80 MG: 80 TABLET, CHEWABLE ORAL at 11:05

## 2024-07-18 RX ADMIN — ACETAMINOPHEN 325MG 650 MG: 325 TABLET ORAL at 21:28

## 2024-07-18 RX ADMIN — IBUPROFEN 600 MG: 600 TABLET, FILM COATED ORAL at 11:06

## 2024-07-18 RX ADMIN — SIMETHICONE 80 MG: 80 TABLET, CHEWABLE ORAL at 17:24

## 2024-07-18 RX ADMIN — KETOROLAC TROMETHAMINE 15 MG: 15 INJECTION, SOLUTION INTRAMUSCULAR; INTRAVENOUS at 04:53

## 2024-07-18 RX ADMIN — ACETAMINOPHEN 1000 MG: 500 TABLET ORAL at 00:20

## 2024-07-18 RX ADMIN — ACETAMINOPHEN 325MG 650 MG: 325 TABLET ORAL at 08:05

## 2024-07-18 RX ADMIN — SERTRALINE 50 MG: 50 TABLET, FILM COATED ORAL at 08:05

## 2024-07-18 NOTE — PLAN OF CARE
Problem: Adult Inpatient Plan of Care  Goal: Plan of Care Review  Outcome: Ongoing, Progressing  Flowsheets (Taken 7/18/2024 0647)  Progress: improving  Plan of Care Reviewed With:   patient   spouse  Outcome Evaluation: VSS, assessment WNL, lochia and fundus WNL, pain well controlled with eras meds, visits infant in NICU.  Goal: Patient-Specific Goal (Individualized)  Outcome: Ongoing, Progressing  Goal: Absence of Hospital-Acquired Illness or Injury  Outcome: Ongoing, Progressing  Intervention: Identify and Manage Fall Risk  Recent Flowsheet Documentation  Taken 7/18/2024 0600 by Tod Jernigan RN  Safety Promotion/Fall Prevention: safety round/check completed  Taken 7/18/2024 0553 by Tod Jernigan RN  Safety Promotion/Fall Prevention: safety round/check completed  Taken 7/18/2024 0453 by Tod Jernigan RN  Safety Promotion/Fall Prevention: safety round/check completed  Taken 7/18/2024 0204 by Tod Jernigan RN  Safety Promotion/Fall Prevention: safety round/check completed  Taken 7/18/2024 0120 by Tod Jernigan RN  Safety Promotion/Fall Prevention: safety round/check completed  Taken 7/18/2024 0020 by Tod Jernigan RN  Safety Promotion/Fall Prevention: safety round/check completed  Taken 7/17/2024 2311 by Tod Jernigan RN  Safety Promotion/Fall Prevention: safety round/check completed  Taken 7/17/2024 2211 by Tod Jernigan RN  Safety Promotion/Fall Prevention: safety round/check completed  Taken 7/17/2024 2020 by Tod Jernigan RN  Safety Promotion/Fall Prevention: safety round/check completed  Intervention: Prevent Skin Injury  Recent Flowsheet Documentation  Taken 7/17/2024 2020 by Tod Jernigan RN  Body Position: position changed independently  Intervention: Prevent and Manage VTE (Venous Thromboembolism) Risk  Recent Flowsheet Documentation  Taken 7/17/2024 2211 by Tod Jernigan RN  VTE Prevention/Management:   bilateral   sequential compression devices on  Taken  2024 by Tod Jernigan RN  Activity Management: up ad zenia  Goal: Optimal Comfort and Wellbeing  Outcome: Ongoing, Progressing  Intervention: Monitor Pain and Promote Comfort  Recent Flowsheet Documentation  Taken 2024 0453 by Tod Jernigan RN  Pain Management Interventions: see MAR  Taken 2024 0020 by Tod Jernigan RN  Pain Management Interventions: see MAR  Taken 2024 2211 by Tod Jernigan RN  Pain Management Interventions: see MAR  Intervention: Provide Person-Centered Care  Recent Flowsheet Documentation  Taken 2024 by Tod Jernigan RN  Trust Relationship/Rapport:   care explained   choices provided   thoughts/feelings acknowledged   reassurance provided   questions encouraged   questions answered  Goal: Readiness for Transition of Care  Outcome: Ongoing, Progressing     Problem: Bleeding ( Delivery)  Goal: Bleeding is Controlled  Outcome: Ongoing, Progressing     Problem: Change in Fetal Wellbeing ( Delivery)  Goal: Stable Fetal Wellbeing  Outcome: Ongoing, Progressing  Intervention: Promote and Monitor Fetal Wellbeing  Recent Flowsheet Documentation  Taken 2024 by Tod Jernigan RN  Body Position: position changed independently     Problem: Infection ( Delivery)  Goal: Absence of Infection Signs and Symptoms  Outcome: Ongoing, Progressing     Problem: Respiratory Compromise ( Delivery)  Goal: Effective Oxygenation and Ventilation  Outcome: Ongoing, Progressing     Problem:  Fall Injury Risk  Goal: Absence of Fall, Infant Drop and Related Injury  Outcome: Ongoing, Progressing  Intervention: Identify and Manage Contributors  Recent Flowsheet Documentation  Taken 2024 by Tod Jernigan RN  Medication Review/Management: medications reviewed  Intervention: Promote Injury-Free Environment  Recent Flowsheet Documentation  Taken 2024 0600 by Tod Jernigan RN  Safety Promotion/Fall  Prevention: safety round/check completed  Taken 7/18/2024 0553 by Tod Jernigan RN  Safety Promotion/Fall Prevention: safety round/check completed  Taken 7/18/2024 0453 by Tod Jernigan RN  Safety Promotion/Fall Prevention: safety round/check completed  Taken 7/18/2024 0204 by Tod Jernigan RN  Safety Promotion/Fall Prevention: safety round/check completed  Taken 7/18/2024 0120 by Tod Jernigan RN  Safety Promotion/Fall Prevention: safety round/check completed  Taken 7/18/2024 0020 by Tod Jernigan RN  Safety Promotion/Fall Prevention: safety round/check completed  Taken 7/17/2024 2311 by Tod Jernigan RN  Safety Promotion/Fall Prevention: safety round/check completed  Taken 7/17/2024 2211 by Tod Jernigan RN  Safety Promotion/Fall Prevention: safety round/check completed  Taken 7/17/2024 2020 by Tod Jernigan RN  Safety Promotion/Fall Prevention: safety round/check completed     Problem: Device-Related Complication Risk (Anesthesia/Analgesia, Neuraxial)  Goal: Safe Infusion Delivery Completion  Outcome: Ongoing, Progressing     Problem: Infection (Anesthesia/Analgesia, Neuraxial)  Goal: Absence of Infection Signs and Symptoms  Outcome: Ongoing, Progressing     Problem: Nausea and Vomiting (Anesthesia/Analgesia, Neuraxial)  Goal: Nausea and Vomiting Relief  Outcome: Ongoing, Progressing     Problem: Pain (Anesthesia/Analgesia, Neuraxial)  Goal: Effective Pain Control  Outcome: Ongoing, Progressing  Intervention: Prevent or Manage Pain  Recent Flowsheet Documentation  Taken 7/18/2024 0453 by Tod Jernigan RN  Pain Management Interventions: see MAR  Taken 7/18/2024 0020 by Tod Jernigan RN  Pain Management Interventions: see MAR  Taken 7/17/2024 2211 by Tod Jernigan RN  Pain Management Interventions: see MAR  Taken 7/17/2024 2020 by Tod Jernigan RN  Diversional Activities:   smartphone   television     Problem: Respiratory Compromise (Anesthesia/Analgesia,  Neuraxial)  Goal: Effective Oxygenation and Ventilation  Outcome: Ongoing, Progressing  Intervention: Optimize Oxygenation and Ventilation  Recent Flowsheet Documentation  Taken 2024 by Tod Jernigan RN  Head of Bed (HOB) Positioning: HOB elevated     Problem: Sensorimotor Impairment (Anesthesia/Analgesia, Neuraxial)  Goal: Baseline Motor Function  Outcome: Ongoing, Progressing  Intervention: Optimize Sensorimotor Function  Recent Flowsheet Documentation  Taken 2024 0600 by Tod Jernigan RN  Safety Promotion/Fall Prevention: safety round/check completed  Taken 2024 0553 by Tod Jernigan RN  Safety Promotion/Fall Prevention: safety round/check completed  Taken 2024 0453 by Tod Jernigan RN  Safety Promotion/Fall Prevention: safety round/check completed  Taken 2024 0204 by Tod Jernigan RN  Safety Promotion/Fall Prevention: safety round/check completed  Taken 2024 0120 by Tod Jernigan RN  Safety Promotion/Fall Prevention: safety round/check completed  Taken 2024 0020 by Tod Jernigan RN  Safety Promotion/Fall Prevention: safety round/check completed  Taken 2024 2311 by Tod Jernigan RN  Safety Promotion/Fall Prevention: safety round/check completed  Taken 2024 2211 by Tod Jernigan RN  Safety Promotion/Fall Prevention: safety round/check completed  Taken 2024 2020 by Tod Jernigan RN  Safety Promotion/Fall Prevention: safety round/check completed     Problem: Urinary Retention (Anesthesia/Analgesia, Neuraxial)  Goal: Effective Urinary Elimination  Outcome: Ongoing, Progressing     Problem: Skin Injury Risk Increased  Goal: Skin Health and Integrity  Outcome: Ongoing, Progressing  Intervention: Optimize Skin Protection  Recent Flowsheet Documentation  Taken 2024 by Tod Jernigan RN  Head of Bed (HOB) Positioning: HOB elevated     Problem: Adjustment to Role Transition (Postpartum  Delivery)  Goal:  Successful Maternal Role Transition  Outcome: Ongoing, Progressing     Problem: Bleeding (Postpartum  Delivery)  Goal: Hemostasis  Outcome: Ongoing, Progressing     Problem: Infection (Postpartum  Delivery)  Goal: Absence of Infection Signs and Symptoms  Outcome: Ongoing, Progressing     Problem: Pain (Postpartum  Delivery)  Goal: Acceptable Pain Control  Outcome: Ongoing, Progressing  Intervention: Prevent or Manage Pain  Recent Flowsheet Documentation  Taken 2024 0453 by Tod Jernigan RN  Pain Management Interventions: see MAR  Taken 2024 0020 by Tod Jernigan RN  Pain Management Interventions: see MAR  Taken 2024 2211 by Tod Jernigan RN  Pain Management Interventions: see MAR     Problem: Postoperative Nausea and Vomiting (Postpartum  Delivery)  Goal: Nausea and Vomiting Relief  Outcome: Ongoing, Progressing     Problem: Postoperative Urinary Retention (Postpartum  Delivery)  Goal: Effective Urinary Elimination  Outcome: Ongoing, Progressing   Goal Outcome Evaluation:  Plan of Care Reviewed With: patient, spouse        Progress: improving  Outcome Evaluation: VSS, assessment WNL, lochia and fundus WNL, pain well controlled with eras meds, visits infant in NICU.

## 2024-07-18 NOTE — PROGRESS NOTES
" POSTPARTUM ROUNDS    Chief complaint: post C/S concerns  SUBJECTIVE:  Patient appears comfortable, and pain seems to be controlled with by mouth medicines.  She is ambulating. .  Discussed advancing activity and diet.       ROS:  Pulm: neg for soa  GI: neg for heavy bleeding  Musculoskel: neg for leg pain      OBJECTIVE:  Vital Signs: /78 (BP Location: Right arm, Patient Position: Sitting)   Pulse 89   Temp 97.7 °F (36.5 °C) (Oral)   Resp 16   Ht 175.3 cm (69\")   Wt (!) 155 kg (341 lb)   LMP 10/19/2023 (Exact Date)   SpO2 96%   Breastfeeding No   BMI 50.36 kg/m²     Intake/Output Summary (Last 24 hours) at 2024 1131  Last data filed at 2024  Gross per 24 hour   Intake --   Output 750 ml   Net -750 ml       Constitutional:  The patient is well nourished.  Cardiovascular:  RRR  Resp:  nonlabored breathing  The incision is normal  Gastrointestinal:  fundus firm below umbilicus  Extremities:  no edema,no evidence dvt    LABS / IMAGING:  Lab Results (last 24 hours)       ** No results found for the last 24 hours. **              OB History    Para Term  AB Living   4 2 2 0 2 2   SAB IAB Ectopic Molar Multiple Live Births   2 0 0 0 0 2      # Outcome Date GA Lbr Celestine/2nd Weight Sex Type Anes PTL Lv   4 Term 24 37w5d  4530 g (9 lb 15.8 oz) M CS-LTranv Spinal N MIRIAM      Birth Comments: OR 1 Panda   3 Term 21 38w3d  4080 g (8 lb 15.9 oz) M CS-LTranv Spinal N MIRIAM      Birth Comments: Panda 1   2 SAB 20 7w3d             Birth Comments: with D&C   1 SAB               Obstetric Comments   23 - LMP rejected - IUP at 6- 4 weeks - BRANDO 24 - [facility]    3/19/24 - 20-4 weeks -normal completed anatomy, normal cervical length.-[facility]    24 - 24-4 weeks - EFW 70%, AC 96% - [facility]    24 - 27-4 weeks - EFW 86%, AC >97% - [facility]     24 - 31-4 weeks - EFW >90%, AC >97% - [facility]    24 - 35-4 weeks - EFW >90 % AC >97% -F           ASSESSMENT AND " PLAN:    Principal Problem:    Polyhydramnios in third trimester  Active Problems:    Bicornuate uterus    Rh negative status during pregnancy    Obesity in pregnancy, antepartum    Previous  delivery affecting pregnancy    Request for sterilization    Maternal anemia in pregnancy, antepartum    History of gestational hypertension    LGA (large for gestational age) fetus affecting management of mother    Pregnancy    Abnormal fetal ultrasound      Overview: BPP is 4 out of 8.     delivery delivered         Patient is postop day 2 from LTCS  Patient is doing well  Advance diet as tolerated  Infant is in NICU      Regular diet   Encourage ambulation     Navid Nunez MD    2024  11:31 EDT

## 2024-07-19 PROCEDURE — 25010000002 ENOXAPARIN PER 10 MG: Performed by: OBSTETRICS & GYNECOLOGY

## 2024-07-19 RX ADMIN — ENOXAPARIN SODIUM 40 MG: 100 INJECTION SUBCUTANEOUS at 03:33

## 2024-07-19 RX ADMIN — IBUPROFEN 600 MG: 600 TABLET, FILM COATED ORAL at 23:25

## 2024-07-19 RX ADMIN — IBUPROFEN 600 MG: 600 TABLET, FILM COATED ORAL at 00:15

## 2024-07-19 RX ADMIN — ACETAMINOPHEN 325MG 650 MG: 325 TABLET ORAL at 20:26

## 2024-07-19 NOTE — PROGRESS NOTES
2024    Name:Davida Quinonez    MR#:[id number]     Progress Note:  Post-Op Day 2 S/P    HD:3    Subjective   29 y.o. yo Female  s/p CS at 37w5d doing well. Pain well controlled. Tolerating regular diet and having flatus. Lochia normal. Infant still in NICU, but weaned off oxygen today.     Patient Active Problem List   Diagnosis    Bicornuate uterus    PCOS (polycystic ovarian syndrome)    Rh negative status during pregnancy    Elevated hemoglobin A1c    Obesity in pregnancy, antepartum    Previous  delivery affecting pregnancy    Request for sterilization    Maternal anemia in pregnancy, antepartum    History of gestational hypertension    LGA (large for gestational age) fetus affecting management of mother    Polyhydramnios in third trimester    Pregnancy    Abnormal fetal ultrasound     delivery delivered        Objective    Vitals  Temp:  Temp:  [97.5 °F (36.4 °C)-98.4 °F (36.9 °C)] 98.4 °F (36.9 °C)  Temp src: Oral  BP:  BP: (100-123)/(67-73) 123/73  Pulse:  Heart Rate:  [60-81] 60  RR:   Resp:  [18] 18  Weight: (!) 155 kg (341 lb)  BMI:  Body mass index is 50.36 kg/m².      General Awake, alert, no distress  Abdomen Soft, non-distended, fundus firm,  below umbilicus, appropriately tender  Incision  Intact, no erythema or exudate  Extremities Calves NT bilaterally         I/O last 3 completed shifts:  In: -   Out: 1100 [Urine:1100]    LABS:   Lab Results   Component Value Date    WBC 16.36 (H) 2024    HGB 11.6 (L) 2024    HCT 35.7 2024    MCV 86.7 2024     2024       Infant: male       Assessment   1.  POD 2 Patient is ambulating around nurses station and to NICU. Baby improving in NICU. Baby also Rh neg.     Plan: Doing well.  Routine postoperative care      Polyhydramnios in third trimester    Bicornuate uterus    Rh negative status during pregnancy    Obesity in pregnancy, antepartum    Previous  delivery affecting  pregnancy    Request for sterilization    Maternal anemia in pregnancy, antepartum    History of gestational hypertension    LGA (large for gestational age) fetus affecting management of mother    Pregnancy    Abnormal fetal ultrasound     delivery delivered      Madeleine Hummel MD  2024 17:44 EDT

## 2024-07-20 VITALS
TEMPERATURE: 97.8 F | HEIGHT: 69 IN | SYSTOLIC BLOOD PRESSURE: 120 MMHG | HEART RATE: 71 BPM | WEIGHT: 293 LBS | OXYGEN SATURATION: 96 % | BODY MASS INDEX: 43.4 KG/M2 | DIASTOLIC BLOOD PRESSURE: 79 MMHG | RESPIRATION RATE: 16 BRPM

## 2024-07-20 PROCEDURE — 25010000002 ENOXAPARIN PER 10 MG: Performed by: OBSTETRICS & GYNECOLOGY

## 2024-07-20 RX ADMIN — ENOXAPARIN SODIUM 40 MG: 100 INJECTION SUBCUTANEOUS at 06:00

## 2024-07-20 RX ADMIN — ACETAMINOPHEN 325MG 650 MG: 325 TABLET ORAL at 03:17

## 2024-07-20 RX ADMIN — ACETAMINOPHEN 325MG 650 MG: 325 TABLET ORAL at 22:08

## 2024-07-20 RX ADMIN — IBUPROFEN 600 MG: 600 TABLET, FILM COATED ORAL at 06:00

## 2024-07-20 RX ADMIN — SERTRALINE 50 MG: 50 TABLET, FILM COATED ORAL at 22:09

## 2024-07-20 RX ADMIN — METFORMIN HYDROCHLORIDE 1000 MG: 500 TABLET, EXTENDED RELEASE ORAL at 10:11

## 2024-07-20 RX ADMIN — ACETAMINOPHEN 325MG 650 MG: 325 TABLET ORAL at 10:11

## 2024-07-20 RX ADMIN — ACETAMINOPHEN 325MG 650 MG: 325 TABLET ORAL at 16:59

## 2024-07-20 RX ADMIN — ENOXAPARIN SODIUM 40 MG: 100 INJECTION SUBCUTANEOUS at 18:06

## 2024-07-20 RX ADMIN — IBUPROFEN 600 MG: 600 TABLET, FILM COATED ORAL at 18:05

## 2024-07-20 NOTE — PROGRESS NOTES
"Discharge Planning Assessment  [facility]     Patient Name: Davida Quinonez  MRN: [id number]  Today's Date: 7/20/2024    Admit Date: 7/16/2024    Plan: Infant may discharge to mother when medically ready. TWAN Sarabia   Discharge Needs Assessment    No documentation.                  Discharge Plan       Row Name 07/20/24 1415       Plan    Plan Infant may discharge to mother when medically ready. TWAN Sarabia    Plan Comments Mother: Davida Quinonez, MRN [id number]; Infant: Pepper \"Pierce\" Cristiano, MRN [id number]. CSW was consulted for \"NICU admission\". Of note, no toxicology screens were ordered for mother or infant as need was not indicated. CSW met with mother and father of infant/spouse at infant's bedside. Mother verified her address, phone number, and insurance. Infant has been added to parent's insurance plan. Mother reports having a car seat, crib/bassinet, clothes, and diapers for infant. This is mother and father's second child; they also have a 3 year old son who is being cared for by maternal and paternal grandparents of infant during mother's hospital stay. Mother's support system includes FOB/spouse, maternal grandparents, paternal grandparents of infant, and maternal aunt of infant. Infant [facility] follow up with [facility], mother is comfortable scheduling appointments for infant, and has reliable transportation. Mother is not current with [facility], and shared she does not qualify due to income. CSW spent time building rapport with mother and father and offered validation, support, and encouragement to them throughout the assessment. CSW provided a packet of resources to mother including: WIC, HANDS, transportation, infant supplies, counseling, online support groups, postpartum mood and anxiety resources, NICU parent resources, and general community resources. Mother and father were polite and cooperative throughout the assessment, and denied having unmet needs at this " time. CSW will remain available for psychosocial needs during infant's NICU stay. TWAN Sarabia                  Continued Care and Services - Admitted Since 7/16/2024    No active coordination exists for this encounter.          Demographic Summary       Row Name 07/20/24 1414       General Information    Admission Type inpatient    Arrived From home    Referral Source nursing    Reason for Consult psychosocial concerns;community resources    General Information Comments NICU admit, resources/support                   Functional Status       Row Name 07/20/24 1414       Mental Status    General Appearance WDL WDL                   Psychosocial       Row Name 07/20/24 1414       Behavior WDL    Behavior WDL WDL       Emotion Mood WDL    Emotion/Mood/Affect WDL WDL       Speech WDL    Speech WDL WDL       Perceptual State WDL    Perceptual State WDL WDL       Thought Process WDL    Thought Process WDL WDL       Intellectual Performance WDL    Intellectual Performance WDL WDL       Coping/Stress    Major Change/Loss/Stressor birth    Patient Personal Strengths able to adapt;expressive of needs;future/goal oriented;positive attitude;strong support system    Sources of Support parent(s);other family members;sibling(s);spouse                   Abuse/Neglect       Row Name 07/20/24 1414       Personal Safety    Physical Signs of Abuse Present no                   Legal    No documentation.                  Substance Abuse    No documentation.                  Patient Forms    No documentation.                     JARAD Millan

## 2024-07-20 NOTE — PLAN OF CARE
Goal Outcome Evaluation:  Plan of Care Reviewed With: patient, spouse        Progress: improving  Outcome Evaluation: VSS, assessment WDL, pain controlled

## 2024-07-20 NOTE — PROGRESS NOTES
[facility]   Obstetrics and Gynecology     2024    Name:Davida Quinonez    MR#:[id number]     Progress Note:  Post-Op    HD:4    Subjective   29 y.o. yo Female  s/p CS at 37w5d doing well. Pain well controlled. Tolerating regular diet and having flatus. Lochia normal.     Patient Active Problem List   Diagnosis    Bicornuate uterus    PCOS (polycystic ovarian syndrome)    Rh negative status during pregnancy    Elevated hemoglobin A1c    Obesity in pregnancy, antepartum    Previous  delivery affecting pregnancy    Request for sterilization    Maternal anemia in pregnancy, antepartum    History of gestational hypertension    LGA (large for gestational age) fetus affecting management of mother    Polyhydramnios in third trimester    Pregnancy    Abnormal fetal ultrasound     delivery delivered        Objective    Vitals  Temp:  Temp:  [98.1 °F (36.7 °C)-98.2 °F (36.8 °C)] 98.2 °F (36.8 °C)  Temp src: Oral  BP:  BP: (122-127)/(68-77) 122/77  Pulse:  Heart Rate:  [77-90] 77  RR:   Resp:  [16-18] 18  Weight: (!) 155 kg (341 lb)  BMI:  Body mass index is 50.36 kg/m².    Physical Exam  Vitals and nursing note reviewed.   Constitutional:       Appearance: Normal appearance. She is obese.   Pulmonary:      Effort: Pulmonary effort is normal.   Neurological:      Mental Status: She is alert and oriented to person, place, and time.   Psychiatric:         Mood and Affect: Mood normal.         Behavior: Behavior normal.         I/O last 3 completed shifts:  In: 1050 [P.O.:1050]  Out: 1300 [Urine:1300]    LABS:  Results from last 7 days   Lab Units 24  0750 24  1757   WBC 10*3/mm3 16.36* 11.34*   HEMOGLOBIN g/dL 11.6* 12.8   HEMATOCRIT % 35.7 38.9   PLATELETS 10*3/mm3 230 254           Infant: male       Assessment    1.  POD#3     Polyhydramnios in third trimester    Bicornuate uterus    Rh negative status during pregnancy    Obesity in pregnancy,  antepartum    Previous  delivery affecting pregnancy    Request for sterilization    Maternal anemia in pregnancy, antepartum    History of gestational hypertension    LGA (large for gestational age) fetus affecting management of mother    Pregnancy    Abnormal fetal ultrasound     delivery delivered      Plan:  Continue routine postoperative care   Plan discharge tomorrow  Continue Lovenox for DVT prophylaxis  Yadi Barrera MD  2024 12:41 EDT

## 2024-07-21 PROCEDURE — 25010000002 ENOXAPARIN PER 10 MG: Performed by: OBSTETRICS & GYNECOLOGY

## 2024-07-21 PROCEDURE — 0503F POSTPARTUM CARE VISIT: CPT | Performed by: OBSTETRICS & GYNECOLOGY

## 2024-07-21 RX ORDER — IBUPROFEN 600 MG/1
600 TABLET ORAL EVERY 6 HOURS
Qty: 24 TABLET | Refills: 0 | Status: SHIPPED | OUTPATIENT
Start: 2024-07-21

## 2024-07-21 RX ADMIN — ENOXAPARIN SODIUM 40 MG: 100 INJECTION SUBCUTANEOUS at 06:36

## 2024-07-21 RX ADMIN — IBUPROFEN 600 MG: 600 TABLET, FILM COATED ORAL at 00:35

## 2024-07-21 RX ADMIN — METFORMIN HYDROCHLORIDE 1000 MG: 500 TABLET, EXTENDED RELEASE ORAL at 09:25

## 2024-07-21 RX ADMIN — ACETAMINOPHEN 325MG 650 MG: 325 TABLET ORAL at 13:28

## 2024-07-21 RX ADMIN — ACETAMINOPHEN 325MG 650 MG: 325 TABLET ORAL at 04:47

## 2024-07-21 RX ADMIN — IBUPROFEN 600 MG: 600 TABLET, FILM COATED ORAL at 06:36

## 2024-07-21 NOTE — DISCHARGE SUMMARY
"OB Discharge Summary C/S    This  female, was admitted on 2024 and underwent a , Low Transverse  on 2024 , resulting in the birth of the following:  Infant         Weight:   Birth Information  YOB: 2025   Time of birth: 12:25 AM   Delivering clinician: Aryan Myers   Sex: male   Delivery type: , Low Transverse   Breech type (if applicable):     Observed anomalies/comments: OR 1 Panda         Observations/Comments:  OR 1 Panda      Apgars: 8  @ 1 minute /    9  @ 5 minutes     LABS:   Lab Results (last 72 hours)       Procedure Component Value Units Date/Time    Tissue Pathology Exam [[id number]] Collected: 24 0038    Specimen: Tissue from Fallopian Tube, Left; Tissue from Fallopian Tube, Right Updated: 24 1256     Case Report --     Surgical Pathology Report                         Case: [id number]                                  Authorizing Provider:  Aryan Myers MD             Collected:           2024 12:38 AM          Ordering Location:     [facility]  Received:            2024 09:21 AM                                 LABOR DELIVERY                                                               Pathologist:           Billy Lizama MD                                                         Specimens:   1) - Fallopian Tube, Left                                                                           2) - Fallopian Tube, Right                                                                  Final Diagnosis --     1.  Left fallopian tube segment, elective sterilization:   A.  Complete cross-section of fimbriated fallopian tube segment.    2.  Right fallopian tube segment, elective sterilization:   A.  Complete cross-section of fimbriated fallopian tube segment and small paratubal cyst.       Gross Description --     1. Fallopian Tube, Left.  Received in formalin labeled \"left fallopian tube\" is a 7.0 x 0.7 cm continuous " "segment of fimbriated fallopian tube with a smooth tan-red intact serosa, pinpoint lumen and wall thickness averaging 0.3 cm.  Representative sections are submitted as follows:    1A-longitudinal section to be cut and embedded on end by histology  1B-fimbriae serially sectioned    jap/uso/jat    2. Fallopian Tube, Right.  Received in formalin labeled \"right fallopian tube\" is a 6.0 x 0.8 cm continuous segment of fimbriated fallopian tube with a smooth tan-red intact serosa, pinpoint lumen and wall thickness averaging 0.3 cm.  Representative sections are submitted as follows:    2A- longitudinal section to be cut and embedded on end by histology  2B- fimbriae serially sectioned    jap/uso/jat                ROS:  Pulm: neg for soa  GI: neg for heavy bleeding              Musculoskel: neg for leg pain        OB History    Para Term  AB Living   4 2 2 0 2 2   SAB IAB Ectopic Molar Multiple Live Births   2 0 0 0 0 2      # Outcome Date GA Lbr Celestine/2nd Weight Sex Type Anes PTL Lv   4 Term 24 37w5d  4530 g (9 lb 15.8 oz) M CS-LTranv Spinal N MIRIAM      Birth Comments: OR 1 Panda   3 Term 21 38w3d  4080 g (8 lb 15.9 oz) M CS-LTranv Spinal N MIRIAM      Birth Comments: Panda 1   2 SAB 20 7w3d             Birth Comments: with D&C   1 SAB               Obstetric Comments   23 - LMP rejected - IUP at 6- 4 weeks - BRANDO 24 - [facility]    3/19/24 - 20-4 weeks -normal completed anatomy, normal cervical length.-[facility]    24 - 24-4 weeks - EFW 70%, AC 96% - [facility]    24 - 27-4 weeks - EFW 86%, AC >97% - [facility]     24 - 31-4 weeks - EFW >90%, AC >97% - [facility]    24 - 35-4 weeks - EFW >90 % AC >97% -[facility]         Discharge diagnosis:     Medical Problems       Hospital Problem List       * (Principal) Polyhydramnios in third trimester    Bicornuate uterus    Rh negative status during pregnancy    Obesity in pregnancy, antepartum    Previous  delivery affecting pregnancy    Request for " sterilization    Maternal anemia in pregnancy, antepartum    History of gestational hypertension    LGA (large for gestational age) fetus affecting management of mother    Pregnancy    Abnormal fetal ultrasound    Overview Signed 2024  5:16 PM by Aryan Myers MD     BPP is 4 out of 8.          delivery delivered                                                        Bicornuate uterus [Q51.3]  Obesity in pregnancy, antepartum [O99.210]  Previous  delivery affecting pregnancy [O34.219]  Rh negative status during pregnancy in second trimester [O26.892, Z67.91]  Maternal anemia in pregnancy, antepartum [O99.019]  Request for sterilization [Z30.2]  Pregnancy [Z34.90]                                                   section at 37w5d, uncomplicated recovery      Post operatively the patient did well. She was tolerating a regular diet, ambulating, voiding and passing flatus. Post op hemoglobin was   Patient was hoping to room and is her infant will be in NICU 1 more day  Lab Results   Component Value Date    HGB 11.6 (L) 2024   .     On day of discharge, uterus was firm, incision was clean, dry and intact, extremities were non tender with no erythema or masses.     Pt was given prescriptions for Zoloft and Motrin PRN for pain.    Instructed to call the office to make an appointment in 2  and 6  Weeks and to    please call the office, or the OB/GYN on-call if after-hours, with any questions,concerns or  any of the followin) Fever - a temperature greater than 100.4  2) Increased pain  3) Heavy vaginal bleeding (soaking more than 1 pad in an hour)  4) Foul-smelling discharge from the vagina  5) Red, painful incision or foul-smelling discharge from incision.    She was instructed to  not place anything in the vagina -  Including  tampons, douches or having sex - until after her  6 week postpartum visit to prevent infection.    Wash your incision everyday with warm water and gentle soap.  You may pat it dry.

## 2024-07-23 ENCOUNTER — POSTPARTUM VISIT (OUTPATIENT)
Dept: OBSTETRICS AND GYNECOLOGY | Age: 29
End: 2024-07-23
Payer: COMMERCIAL

## 2024-07-23 VITALS
BODY MASS INDEX: 43.4 KG/M2 | WEIGHT: 293 LBS | SYSTOLIC BLOOD PRESSURE: 110 MMHG | HEIGHT: 69 IN | DIASTOLIC BLOOD PRESSURE: 80 MMHG

## 2024-07-23 DIAGNOSIS — Z90.79 STATUS POST BILATERAL SALPINGECTOMY: ICD-10-CM

## 2024-07-23 PROBLEM — Z67.91 RH NEGATIVE STATUS DURING PREGNANCY: Status: RESOLVED | Noted: 2023-06-05 | Resolved: 2024-07-23

## 2024-07-23 PROBLEM — O36.60X0 LGA (LARGE FOR GESTATIONAL AGE) FETUS AFFECTING MANAGEMENT OF MOTHER: Status: RESOLVED | Noted: 2024-05-07 | Resolved: 2024-07-23

## 2024-07-23 PROBLEM — O28.3 ABNORMAL FETAL ULTRASOUND: Status: RESOLVED | Noted: 2024-07-16 | Resolved: 2024-07-23

## 2024-07-23 PROBLEM — O26.899 RH NEGATIVE STATUS DURING PREGNANCY: Status: RESOLVED | Noted: 2023-06-05 | Resolved: 2024-07-23

## 2024-07-23 PROBLEM — O40.3XX0 POLYHYDRAMNIOS IN THIRD TRIMESTER: Status: RESOLVED | Noted: 2024-06-25 | Resolved: 2024-07-23

## 2024-07-23 PROBLEM — O99.019 MATERNAL ANEMIA IN PREGNANCY, ANTEPARTUM: Status: RESOLVED | Noted: 2024-04-16 | Resolved: 2024-07-23

## 2024-07-23 PROBLEM — Z87.59 HISTORY OF GESTATIONAL HYPERTENSION: Status: RESOLVED | Noted: 2024-04-16 | Resolved: 2024-07-23

## 2024-07-23 PROBLEM — Z34.90 PREGNANCY: Status: RESOLVED | Noted: 2024-07-16 | Resolved: 2024-07-23

## 2024-07-23 PROBLEM — Z30.2 REQUEST FOR STERILIZATION: Status: RESOLVED | Noted: 2024-04-16 | Resolved: 2024-07-23

## 2024-07-23 PROBLEM — O99.210 OBESITY IN PREGNANCY, ANTEPARTUM: Status: RESOLVED | Noted: 2024-01-02 | Resolved: 2024-07-23

## 2024-07-23 PROBLEM — O34.219 PREVIOUS CESAREAN DELIVERY AFFECTING PREGNANCY: Status: RESOLVED | Noted: 2024-01-02 | Resolved: 2024-07-23

## 2024-07-23 PROCEDURE — 0503F POSTPARTUM CARE VISIT: CPT | Performed by: OBSTETRICS & GYNECOLOGY

## 2024-07-23 NOTE — PROGRESS NOTES
"Subjective   Davida Quinonez is a 29 y.o. female who presents for a postpartum visit. She is 6 days postpartum following a low cervical transverse  section. I have fully reviewed the prenatal and intrapartum course. The delivery was at 37-5 gestational weeks. Outcome: repeat  section, low transverse incision. Anesthesia: spinal. Postpartum course has been uncomplicated. Baby's course has been c/b NICU stay . Baby is feeding by bottle. Bleeding thin lochia. Bowel function is normal. Bladder function is normal. Patient is not sexually active. Contraception method is tubal ligation. Postpartum depression screening: negative.    The following portions of the patient's history were reviewed and updated as appropriate: allergies, current medications, past family history, past medical history, past social history, past surgical history, and problem list.    Review of Systems  Pertinent items are noted in HPI.    Objective   /80   Ht 175.3 cm (69\")   Wt (!) 147 kg (324 lb)   LMP 10/19/2023 (Exact Date)   Breastfeeding No   BMI 47.85 kg/m²    General:  alert, appears stated age, and cooperative   Abdomen: soft, non-tender; bowel sounds normal; no masses,  no organomegaly and Inc C/D/I      Assessment & Plan   postpartum exam. Pap smear not done at today's visit.    1. Contraception: tubal ligation  .2. Follow up in: 5 weeks or as needed.           "

## 2024-07-26 ENCOUNTER — HOSPITAL ENCOUNTER (EMERGENCY)
Facility: HOSPITAL | Age: 29
Discharge: HOME | End: 2024-07-26
Attending: OBSTETRICS & GYNECOLOGY | Admitting: OBSTETRICS & GYNECOLOGY
Payer: COMMERCIAL

## 2024-07-26 ENCOUNTER — TELEPHONE (OUTPATIENT)
Dept: OBSTETRICS AND GYNECOLOGY | Age: 29
End: 2024-07-26
Payer: COMMERCIAL

## 2024-07-26 VITALS
RESPIRATION RATE: 18 BRPM | HEART RATE: 71 BPM | BODY MASS INDEX: 43.4 KG/M2 | WEIGHT: 293 LBS | SYSTOLIC BLOOD PRESSURE: 129 MMHG | DIASTOLIC BLOOD PRESSURE: 67 MMHG | HEIGHT: 69 IN | OXYGEN SATURATION: 97 %

## 2024-07-26 PROBLEM — R79.89 ELEVATED LIVER FUNCTION TESTS: Status: ACTIVE | Noted: 2024-07-26

## 2024-07-26 LAB
ALBUMIN SERPL-MCNC: 3.9 G/DL (ref 3.5–5.2)
ALBUMIN/GLOB SERPL: 1.4 G/DL
ALP SERPL-CCNC: 124 U/L (ref 39–117)
ALT SERPL W P-5'-P-CCNC: 47 U/L (ref 1–33)
ANION GAP SERPL CALCULATED.3IONS-SCNC: 14.7 MMOL/L (ref 5–15)
AST SERPL-CCNC: 38 U/L (ref 1–32)
BASOPHILS # BLD AUTO: 0.12 10*3/MM3 (ref 0–0.2)
BASOPHILS NFR BLD AUTO: 1.2 % (ref 0–1.5)
BILIRUB SERPL-MCNC: <0.2 MG/DL (ref 0–1.2)
BUN SERPL-MCNC: 15 MG/DL (ref 6–20)
BUN/CREAT SERPL: 16 (ref 7–25)
CALCIUM SPEC-SCNC: 9.1 MG/DL (ref 8.6–10.5)
CHLORIDE SERPL-SCNC: 104 MMOL/L (ref 98–107)
CO2 SERPL-SCNC: 20.3 MMOL/L (ref 22–29)
CREAT SERPL-MCNC: 0.94 MG/DL (ref 0.57–1)
CREAT UR-MCNC: 99 MG/DL
DEPRECATED RDW RBC AUTO: 42.2 FL (ref 37–54)
EGFRCR SERPLBLD CKD-EPI 2021: 83.9 ML/MIN/1.73
EOSINOPHIL # BLD AUTO: 0.89 10*3/MM3 (ref 0–0.4)
EOSINOPHIL NFR BLD AUTO: 9.2 % (ref 0.3–6.2)
ERYTHROCYTE [DISTWIDTH] IN BLOOD BY AUTOMATED COUNT: 13.5 % (ref 12.3–15.4)
GLOBULIN UR ELPH-MCNC: 2.7 GM/DL
GLUCOSE SERPL-MCNC: 87 MG/DL (ref 65–99)
HCT VFR BLD AUTO: 36.9 % (ref 34–46.6)
HGB BLD-MCNC: 12 G/DL (ref 12–15.9)
IMM GRANULOCYTES # BLD AUTO: 0.05 10*3/MM3 (ref 0–0.05)
IMM GRANULOCYTES NFR BLD AUTO: 0.5 % (ref 0–0.5)
LYMPHOCYTES # BLD AUTO: 2.88 10*3/MM3 (ref 0.7–3.1)
LYMPHOCYTES NFR BLD AUTO: 29.7 % (ref 19.6–45.3)
MCH RBC QN AUTO: 28.4 PG (ref 26.6–33)
MCHC RBC AUTO-ENTMCNC: 32.5 G/DL (ref 31.5–35.7)
MCV RBC AUTO: 87.4 FL (ref 79–97)
MONOCYTES # BLD AUTO: 0.75 10*3/MM3 (ref 0.1–0.9)
MONOCYTES NFR BLD AUTO: 7.7 % (ref 5–12)
NEUTROPHILS NFR BLD AUTO: 5 10*3/MM3 (ref 1.7–7)
NEUTROPHILS NFR BLD AUTO: 51.7 % (ref 42.7–76)
NRBC BLD AUTO-RTO: 0 /100 WBC (ref 0–0.2)
PLATELET # BLD AUTO: 399 10*3/MM3 (ref 140–450)
PMV BLD AUTO: 8.5 FL (ref 6–12)
POTASSIUM SERPL-SCNC: 4 MMOL/L (ref 3.5–5.2)
PROT ?TM UR-MCNC: 5.4 MG/DL
PROT SERPL-MCNC: 6.6 G/DL (ref 6–8.5)
PROT/CREAT UR: 54.5 MG/G CREA (ref 0–200)
RBC # BLD AUTO: 4.22 10*6/MM3 (ref 3.77–5.28)
SODIUM SERPL-SCNC: 139 MMOL/L (ref 136–145)
WBC NRBC COR # BLD AUTO: 9.69 10*3/MM3 (ref 3.4–10.8)

## 2024-07-26 PROCEDURE — 84156 ASSAY OF PROTEIN URINE: CPT | Performed by: OBSTETRICS & GYNECOLOGY

## 2024-07-26 PROCEDURE — 85025 COMPLETE CBC W/AUTO DIFF WBC: CPT | Performed by: OBSTETRICS & GYNECOLOGY

## 2024-07-26 PROCEDURE — 82570 ASSAY OF URINE CREATININE: CPT | Performed by: OBSTETRICS & GYNECOLOGY

## 2024-07-26 PROCEDURE — 80053 COMPREHEN METABOLIC PANEL: CPT | Performed by: OBSTETRICS & GYNECOLOGY

## 2024-07-26 PROCEDURE — 99283 EMERGENCY DEPT VISIT LOW MDM: CPT | Performed by: OBSTETRICS & GYNECOLOGY

## 2024-07-26 RX ORDER — SODIUM CHLORIDE 0.9 % (FLUSH) 0.9 %
10 SYRINGE (ML) INJECTION EVERY 12 HOURS SCHEDULED
Status: DISCONTINUED | OUTPATIENT
Start: 2024-07-26 | End: 2024-07-27 | Stop reason: HOSPADM

## 2024-07-26 RX ORDER — IBUPROFEN 800 MG/1
800 TABLET ORAL ONCE
Status: COMPLETED | OUTPATIENT
Start: 2024-07-26 | End: 2024-07-26

## 2024-07-26 RX ORDER — ACETAMINOPHEN 500 MG
1000 TABLET ORAL EVERY 6 HOURS PRN
COMMUNITY

## 2024-07-26 RX ORDER — FERROUS SULFATE 325(65) MG
325 TABLET ORAL
COMMUNITY

## 2024-07-26 RX ORDER — SODIUM CHLORIDE 0.9 % (FLUSH) 0.9 %
10 SYRINGE (ML) INJECTION AS NEEDED
Status: DISCONTINUED | OUTPATIENT
Start: 2024-07-26 | End: 2024-07-27 | Stop reason: HOSPADM

## 2024-07-26 RX ORDER — SODIUM CHLORIDE 9 MG/ML
40 INJECTION, SOLUTION INTRAVENOUS AS NEEDED
Status: DISCONTINUED | OUTPATIENT
Start: 2024-07-26 | End: 2024-07-27 | Stop reason: HOSPADM

## 2024-07-26 RX ADMIN — IBUPROFEN 800 MG: 800 TABLET, FILM COATED ORAL at 20:35

## 2024-07-26 NOTE — TELEPHONE ENCOUNTER
Pt called MD on call reporting headache and elevated BP. She is s/p c/s on 7/17. Advised pt to proceed to L&D for evaluation.

## 2024-07-27 NOTE — DISCHARGE INSTRUCTIONS
Please call OB/GYN office first thing Monday morning 7/29/24 to schedule a follow-up appointment for Monday or Tuesday.

## 2024-07-27 NOTE — OBED NOTES
[facility] Note [facility]        Patient Name: Davida Quinonez  YOB: 1995  MRN: [id number]  Admission Date: 2024  7:51 PM  Date of Service: 2024    Chief Complaint: Headache and Elevated Blood Pressure (Patient to [facility] complaint of headache that started this morning at 0700. Patient stated she rested all day other than caring for children. Delivered on  and has history of HTN with previous pregnancy. Took two extra strength tylenol at 1845 and checked blood pressure at home. States it was 159/89. Denies vision changes.)      Subjective     Davida Quinonez is a 29 y.o. female  who is POD#9 s/p repeat  of  second baby.   She presents with  headache and report of elevated BP at home.  She saw Rosemarie Hernandez MD for her prenatal care. Her pregnancy was complicated by:   history of GHTN, bicornuate uterus, previous , polyhydramnios .    Patient reports headache at home not improved with tylenol.  She also took BP and states it was 159/89.  She denies RUQ/epigastric pain, SOB, or visual changes.            Objective   Patient Active Problem List    Diagnosis     Status post bilateral salpingectomy [Z90.79]     Elevated hemoglobin A1c [R73.09]     PCOS (polycystic ovarian syndrome) [E28.2]     Bicornuate uterus [Q51.3]         OB History    Para Term  AB Living   5 2 2 0 2 2   SAB IAB Ectopic Molar Multiple Live Births   2 0 0 0 0 2      # Outcome Date GA Lbr Celestine/2nd Weight Sex Type Anes PTL Lv   5 Current            4 Term 24 37w5d  4530 g (9 lb 15.8 oz) M CS-LTranv Spinal N MIRIAM      Birth Comments: OR 1 Alexei      Name: Rigo      Apgar1: 8  Apgar5: 9   3 Term 21 38w3d  4080 g (8 lb 15.9 oz) M CS-LTranv Spinal N MIRIAM      Birth Comments: Carlitosdanny 1      Name: Manuelito       Apgar1: 8  Apgar5: 9   2 SAB 20 7w3d             Birth Comments: with D&C   1 SAB                 Past Medical History:   Diagnosis Date    Abnormal Pap smear of  cervix     ADHD (attention deficit hyperactivity disorder)     never medicated    Anxiety     not medicated during pregnancy    Arthritis     Clinical diagnosis of COVID-19 2022    HEADACHE ONLY    Depression     not medicated during pregnancy    Gestational hypertension     HPV in female     Impingement syndrome of left shoulder     Miscarriage     Obesity 2008    Sleep apnea     cpap occasionally       Past Surgical History:   Procedure Laterality Date    ADENOIDECTOMY      APPENDECTOMY N/A 2023    Procedure: APPENDECTOMY LAPAROSCOPIC;  Surgeon: Brandt Lemus Jr., MD;  Location: [facility] OR;  Service: General;  Laterality: N/A;     SECTION N/A 2021    Procedure:  SECTION PRIMARY;  Surgeon: Yadi Barrera MD;  Location: [facility] LABOR DELIVERY;  Service: Obstetrics/Gynecology;  Laterality: N/A;    D & C WITH SUCTION N/A 2020    Procedure: DILATATION AND CURETTAGE WITH SUCTION;  Surgeon: Yadi Barrera MD;  Location: [facility] MAIN OR;  Service: Obstetrics/Gynecology;  Laterality: N/A;    D & C WITH SUCTION N/A 2023    Procedure: DILATATION AND CURETTAGE WITH SUCTION;  Surgeon: Yadi Barrera MD;  Location: [facility] MAIN OR;  Service: Obstetrics/Gynecology;  Laterality: N/A;    EAR TUBES      JOINT MANIPULATION Left 2022    Procedure: LEFT SHOULDER MANIPULATION;  Surgeon: Navid Hinds MD;  Location: [facility] OR [facility];  Service: Orthopedics;  Laterality: Left;    LAPAROSCOPIC CHOLECYSTECTOMY      SHOULDER ARTHROSCOPY W/ SUPERIOR LABRAL ANTERIOR POSTERIOR REPAIR Left 2022    Procedure: LEFT SHOULDER ARTHROSCOPY LBARAL REPAIR WITH DISTAL CLAVICLE EXCISION AND SUBACROMIAL DECOMPRESSION;  Surgeon: Navid Hinds MD;  Location: [facility] OR [facility];  Service: Orthopedics;  Laterality: Left;    TONSILLECTOMY      WISDOM TOOTH EXTRACTION         No current facility-administered medications on file prior to encounter.     Current Outpatient Medications on  File Prior to Encounter   Medication Sig Dispense Refill    acetaminophen (TYLENOL) 500 MG tablet Take 2 tablets by mouth Every 6 (Six) Hours As Needed for Mild Pain.      ferrous sulfate 325 (65 FE) MG tablet Take 1 tablet by mouth Daily With Breakfast.      ibuprofen (ADVIL,MOTRIN) 600 MG tablet Take 1 tablet by mouth Every 6 (Six) Hours. 24 tablet 0    prenatal vitamin (prenatal, CLASSIC, vitamin) tablet Take 1 tablet by mouth Daily.      sertraline (ZOLOFT) 50 MG tablet Take 1 tablet by mouth Every Night. 30 tablet 1    metFORMIN (GLUCOPHAGE) 500 MG tablet Take 1 tablet by mouth 2 (Two) Times a Day With Meals.         No Known Allergies    Family History   Problem Relation Age of Onset    Thyroid disease Mother     Depression Mother     Anxiety disorder Mother     Miscarriages / Stillbirths Mother     Vision loss Mother     Hypertension Father     Hyperlipidemia Father     Cancer Maternal Grandmother     Pancreatic cancer Maternal Grandmother     Pancreatitis Maternal Grandmother     No Known Problems Brother     No Known Problems Sister     Diabetes Maternal Grandfather     Ovarian cancer Other     Malig Hyperthermia Neg Hx     Breast cancer Neg Hx     Uterine cancer Neg Hx     Colon cancer Neg Hx        Social History     Socioeconomic History    Marital status:      Spouse name: adolfo    Number of children: 1   Tobacco Use    Smoking status: Never     Passive exposure: Never    Smokeless tobacco: Never   Vaping Use    Vaping status: Never Used   Substance and Sexual Activity    Alcohol use: No    Drug use: No    Sexual activity: Yes     Partners: Male     Birth control/protection: Tubal ligation           Review of Systems   Constitutional:  Negative for chills, fatigue and fever.   HENT:  Negative for congestion, rhinorrhea and sore throat.    Eyes:  Negative for visual disturbance.   Respiratory: Negative.     Cardiovascular: Negative.    Gastrointestinal:  Negative for abdominal pain, constipation,  "diarrhea, nausea and vomiting.   Genitourinary:  Negative for difficulty urinating, dyspareunia, dysuria, flank pain, frequency, genital sores, hematuria, pelvic pain, urgency, vaginal bleeding, vaginal discharge and vaginal pain.   Neurological:  Positive for headaches. Negative for dizziness, seizures and light-headedness.   Psychiatric/Behavioral:  Negative for sleep disturbance. The patient is not nervous/anxious.           PHYSICAL EXAM:      VITAL SIGNS:  Vitals:    24   BP:  123/67 114/63 112/66   BP Location:  Right arm     Patient Position:  Lying     Pulse:  65 63 72   Resp:  18     SpO2:  97%     Weight: (!) 143 kg (316 lb 3.2 oz)      Height:  175.3 cm (69\")            PHYSICAL EXAM:       General:     well developed; well nourished  no acute distress        Neuro:     Alert and oriented x 3, gait normal., reflexes normal and symmetric, strength and  sensation grossly normal        Psych:     alert,oriented, in NAD with a full range of affect, normal behavior and no psychotic features         Heart:     Not performed.         Head:     normocephalic        Lungs:     breathing is unlabored           Abdomen:     soft, non-tender; no masses  no umbilical or inguinal hernias are present  no hepato-splenomegaly  incision is clean, dry, intact, and without drainage                       Extremities:       peripheral pulses normal, no pedal edema, no clubbing or cyanosis             LABS AND TESTING ORDERED:  CBC  CMP  Urine p/c    LAB RESULTS:    No results found for this or any previous visit (from the past 24 hour(s)).    Lab Results   Component Value Date    ABO A 2024    RH Negative 2024               Assessment & Plan     ASSESSMENT/PLAN:    Davida Quinonez is a 29 y.o. female  is POD# 9 s/p repeat  who presented with: elevated BP at home and headache.  Blood pressure all normal here on serial readings.  Pre-eclampsia " "labs sent and normal except for mild elevation in LFTs.  Patient given ibuprofen for headache and had improvement.  I discussed her with Dr. Hernandez who agrees that patient does not meet pre-eclampsia or HELLP criteria.  Plan for close office follow-up with repeat labs and BP check early next week.  Patient given instruction to call office Monday morning, and Dr. Hernandez also going to alert her office staff.  Patient discharged home.          Vital signs were reviewed and were unremarkable                   Vitals:    07/26/24 1952 07/26/24 2006 07/26/24 2010 07/26/24 2016   BP:  123/67 114/63 112/66   BP Location:  Right arm     Patient Position:  Lying     Pulse:  65 63 72   Resp:  18     SpO2:  97%     Weight: (!) 143 kg (316 lb 3.2 oz)      Height:  175.3 cm (69\")         She will be discharged to home         She will continue her home meds       Your medication list        ASK your doctor about these medications        Instructions Last Dose Given Next Dose Due   acetaminophen 500 MG tablet  Commonly known as: TYLENOL      Take 2 tablets by mouth Every 6 (Six) Hours As Needed for Mild Pain.       ferrous sulfate 325 (65 FE) MG tablet      Take 1 tablet by mouth Daily With Breakfast.       ibuprofen 600 MG tablet  Commonly known as: ADVIL,MOTRIN      Take 1 tablet by mouth Every 6 (Six) Hours.       metFORMIN 500 MG tablet  Commonly known as: GLUCOPHAGE      Take 1 tablet by mouth 2 (Two) Times a Day With Meals.       prenatal (CLASSIC) vitamin 28-0.8 MG tablet tablet  Generic drug: prenatal vitamin      Take 1 tablet by mouth Daily.       sertraline 50 MG tablet  Commonly known as: ZOLOFT      Take 1 tablet by mouth Every Night.                  She will follow up with Rosemarie Hernandez MD as scheduled and as needed  She has been instructed to return for worsening symptoms, persistent elevation in BP.  She may call the office or [facility] with questions.           I have spent 45 minutes including face to face time " with the patient, greater than 50% in discussion of the diagnosis (counseling) and/or coordination of care.         Jennifer Brandt MD  7/26/2024  20:19 EDT  OB Hospitalist  Phone:  [phone]

## 2024-07-29 ENCOUNTER — CLINICAL SUPPORT (OUTPATIENT)
Dept: OBSTETRICS AND GYNECOLOGY | Age: 29
End: 2024-07-29
Payer: COMMERCIAL

## 2024-07-29 VITALS — SYSTOLIC BLOOD PRESSURE: 116 MMHG | DIASTOLIC BLOOD PRESSURE: 72 MMHG

## 2024-07-29 NOTE — TELEPHONE ENCOUNTER
Appointment or Doppler ? [place] patient .   
Regarding: Spotting   Contact: [phone]  ----- Message from Sarah Herrera sent at 2/19/2024  9:00 AM EST -----       ----- Message from Davida Quinonez to Yadi Long APRN sent at 2/17/2024 10:08 AM -----   Hello, I started spotting again today. It is dark brown and when I wipe but a little was on my underwear.   
Spotting stopped , denies any cramping or VB  . She will keep us posted if symptoms worsens . Thank you Yadi .   
recommended every 1-2 years to screen for glaucoma; cataracts, macular degeneration, and other eye disorders.  A preventive dental visit is recommended every 6 months.  Try to get at least 150 minutes of exercise per week or 10,000 steps per day on a pedometer .  Order or download the FREE \"Exercise & Physical Activity: Your Everyday Guide\" from [employer]. Call 1-389.743.9040 or search [employer] online.  You need 8651-5123 mg of calcium and 7192-3244 IU of vitamin D per day. It is possible to meet your calcium requirement with diet alone, but a vitamin D supplement is usually necessary to meet this goal.  When exposed to the sun, use a sunscreen that protects against both UVA and UVB radiation with an SPF of 30 or greater. Reapply every 2 to 3 hours or after sweating, drying off with a towel, or swimming.  Always wear a seat belt when traveling in a car. Always wear a helmet when riding a bicycle or motorcycle.  
DISCHARGE

## 2024-08-27 ENCOUNTER — POSTPARTUM VISIT (OUTPATIENT)
Dept: OBSTETRICS AND GYNECOLOGY | Age: 29
End: 2024-08-27
Payer: COMMERCIAL

## 2024-08-27 VITALS
HEIGHT: 69 IN | BODY MASS INDEX: 43.4 KG/M2 | WEIGHT: 293 LBS | DIASTOLIC BLOOD PRESSURE: 78 MMHG | SYSTOLIC BLOOD PRESSURE: 120 MMHG

## 2024-08-27 DIAGNOSIS — Z90.79 STATUS POST BILATERAL SALPINGECTOMY: ICD-10-CM

## 2024-08-27 DIAGNOSIS — R79.89 ELEVATED LIVER FUNCTION TESTS: ICD-10-CM

## 2024-08-27 PROCEDURE — 0503F POSTPARTUM CARE VISIT: CPT | Performed by: OBSTETRICS & GYNECOLOGY

## 2024-08-27 NOTE — PROGRESS NOTES
"Subjective   Davida Quinonez is a 29 y.o. female who presents for a postpartum visit. She is 6 weeks postpartum following a low cervical transverse  section. I have fully reviewed the prenatal and intrapartum course. The delivery was at 37-5 gestational weeks. Outcome: repeat  section, low transverse incision. Anesthesia: spinal. Postpartum course has been uncomplicated. Baby's course has been uncomplicated. Baby is feeding by bottle. Bleeding thin lochia. Bowel function is normal. Bladder function is normal. Patient is not sexually active. Contraception method is tubal ligation. Postpartum depression screening: negative.    The following portions of the patient's history were reviewed and updated as appropriate: allergies, current medications, past family history, past medical history, past social history, past surgical history, and problem list.    Review of Systems  Pertinent items are noted in HPI.    Objective   /78   Ht 175.3 cm (69\")   Wt (!) 141 kg (311 lb)   LMP 2024 (Approximate)   Breastfeeding No   BMI 45.93 kg/m²    General:  alert, appears stated age, and cooperative   Abdomen: soft, non-tender; bowel sounds normal; no masses,  no organomegaly and Inc healed well      Assessment & Plan    postpartum exam. Pap smear not done at today's visit.    1. Contraception: tubal ligation  2. Elevated LFTs - recheck today   3. Follow up in: 8 months or as needed.           "

## 2024-08-28 LAB
ALBUMIN SERPL-MCNC: 4.6 G/DL (ref 4–5)
ALP SERPL-CCNC: 152 IU/L (ref 44–121)
ALT SERPL-CCNC: 102 IU/L (ref 0–32)
AST SERPL-CCNC: 47 IU/L (ref 0–40)
BILIRUB SERPL-MCNC: 0.3 MG/DL (ref 0–1.2)
BUN SERPL-MCNC: 8 MG/DL (ref 6–20)
BUN/CREAT SERPL: 10 (ref 9–23)
CALCIUM SERPL-MCNC: 9.6 MG/DL (ref 8.7–10.2)
CHLORIDE SERPL-SCNC: 104 MMOL/L (ref 96–106)
CO2 SERPL-SCNC: 24 MMOL/L (ref 20–29)
CREAT SERPL-MCNC: 0.77 MG/DL (ref 0.57–1)
EGFRCR SERPLBLD CKD-EPI 2021: 106 ML/MIN/1.73
GLOBULIN SER CALC-MCNC: 2.2 G/DL (ref 1.5–4.5)
GLUCOSE SERPL-MCNC: 92 MG/DL (ref 70–99)
POTASSIUM SERPL-SCNC: 4.3 MMOL/L (ref 3.5–5.2)
PROT SERPL-MCNC: 6.8 G/DL (ref 6–8.5)
SODIUM SERPL-SCNC: 143 MMOL/L (ref 134–144)

## 2024-08-28 NOTE — PROGRESS NOTES
Call patient and notify of elevated LFTs-increased from 1 month ago-advise her to get in in the next 2 weeks with her primary care doctor.  He is let me know of any further questions.

## 2024-08-29 NOTE — PROGRESS NOTES
Pt notified and agrees to follow up with her primary care doctor. Pt has no further questions at this time

## 2024-08-30 ENCOUNTER — APPOINTMENT (OUTPATIENT)
Dept: CT IMAGING | Facility: HOSPITAL | Age: 29
End: 2024-08-30
Payer: COMMERCIAL

## 2024-08-30 ENCOUNTER — APPOINTMENT (OUTPATIENT)
Dept: GENERAL RADIOLOGY | Facility: HOSPITAL | Age: 29
End: 2024-08-30
Payer: COMMERCIAL

## 2024-08-30 ENCOUNTER — HOSPITAL ENCOUNTER (EMERGENCY)
Facility: HOSPITAL | Age: 29
Discharge: HOME | End: 2024-08-30
Attending: EMERGENCY MEDICINE
Payer: COMMERCIAL

## 2024-08-30 VITALS
DIASTOLIC BLOOD PRESSURE: 53 MMHG | SYSTOLIC BLOOD PRESSURE: 109 MMHG | HEART RATE: 56 BPM | OXYGEN SATURATION: 96 % | TEMPERATURE: 98.4 F | RESPIRATION RATE: 18 BRPM

## 2024-08-30 DIAGNOSIS — R07.81 PLEURITIC CHEST PAIN: Primary | ICD-10-CM

## 2024-08-30 LAB
ALBUMIN SERPL-MCNC: 4.5 G/DL (ref 3.5–5.2)
ALBUMIN/GLOB SERPL: 1.8 G/DL
ALP SERPL-CCNC: 131 U/L (ref 39–117)
ALT SERPL W P-5'-P-CCNC: 70 U/L (ref 1–33)
ANION GAP SERPL CALCULATED.3IONS-SCNC: 13 MMOL/L (ref 5–15)
APTT PPP: 26.9 SECONDS (ref 22.7–35.4)
AST SERPL-CCNC: 38 U/L (ref 1–32)
BASOPHILS # BLD AUTO: 0.05 10*3/MM3 (ref 0–0.2)
BASOPHILS NFR BLD AUTO: 0.5 % (ref 0–1.5)
BILIRUB SERPL-MCNC: 0.3 MG/DL (ref 0–1.2)
BUN SERPL-MCNC: 13 MG/DL (ref 6–20)
BUN/CREAT SERPL: 17.6 (ref 7–25)
CALCIUM SPEC-SCNC: 9.9 MG/DL (ref 8.6–10.5)
CHLORIDE SERPL-SCNC: 104 MMOL/L (ref 98–107)
CO2 SERPL-SCNC: 23 MMOL/L (ref 22–29)
CREAT SERPL-MCNC: 0.74 MG/DL (ref 0.57–1)
D DIMER PPP FEU-MCNC: 1.05 MCGFEU/ML (ref 0–0.5)
DEPRECATED RDW RBC AUTO: 42.8 FL (ref 37–54)
EGFRCR SERPLBLD CKD-EPI 2021: 111.8 ML/MIN/1.73
EOSINOPHIL # BLD AUTO: 0.22 10*3/MM3 (ref 0–0.4)
EOSINOPHIL NFR BLD AUTO: 2.3 % (ref 0.3–6.2)
ERYTHROCYTE [DISTWIDTH] IN BLOOD BY AUTOMATED COUNT: 13.7 % (ref 12.3–15.4)
GEN 5 2HR TROPONIN T REFLEX: <6 NG/L
GLOBULIN UR ELPH-MCNC: 2.5 GM/DL
GLUCOSE SERPL-MCNC: 116 MG/DL (ref 65–99)
HCT VFR BLD AUTO: 40.1 % (ref 34–46.6)
HGB BLD-MCNC: 13.1 G/DL (ref 12–15.9)
HOLD SPECIMEN: NORMAL
HOLD SPECIMEN: NORMAL
IMM GRANULOCYTES # BLD AUTO: 0.02 10*3/MM3 (ref 0–0.05)
IMM GRANULOCYTES NFR BLD AUTO: 0.2 % (ref 0–0.5)
INR PPP: 0.96 (ref 0.9–1.1)
LYMPHOCYTES # BLD AUTO: 3.09 10*3/MM3 (ref 0.7–3.1)
LYMPHOCYTES NFR BLD AUTO: 31.8 % (ref 19.6–45.3)
MCH RBC QN AUTO: 28.5 PG (ref 26.6–33)
MCHC RBC AUTO-ENTMCNC: 32.7 G/DL (ref 31.5–35.7)
MCV RBC AUTO: 87.2 FL (ref 79–97)
MONOCYTES # BLD AUTO: 0.73 10*3/MM3 (ref 0.1–0.9)
MONOCYTES NFR BLD AUTO: 7.5 % (ref 5–12)
NEUTROPHILS NFR BLD AUTO: 5.62 10*3/MM3 (ref 1.7–7)
NEUTROPHILS NFR BLD AUTO: 57.7 % (ref 42.7–76)
NRBC BLD AUTO-RTO: 0 /100 WBC (ref 0–0.2)
NT-PROBNP SERPL-MCNC: <36 PG/ML (ref 0–450)
PLATELET # BLD AUTO: 270 10*3/MM3 (ref 140–450)
PMV BLD AUTO: 8.8 FL (ref 6–12)
POTASSIUM SERPL-SCNC: 4.2 MMOL/L (ref 3.5–5.2)
PROT SERPL-MCNC: 7 G/DL (ref 6–8.5)
PROTHROMBIN TIME: 13 SECONDS (ref 11.7–14.2)
QT INTERVAL: 367 MS
QTC INTERVAL: 439 MS
RBC # BLD AUTO: 4.6 10*6/MM3 (ref 3.77–5.28)
SODIUM SERPL-SCNC: 140 MMOL/L (ref 136–145)
TROPONIN T DELTA: NORMAL
TROPONIN T SERPL HS-MCNC: <6 NG/L
WBC NRBC COR # BLD AUTO: 9.73 10*3/MM3 (ref 3.4–10.8)
WHOLE BLOOD HOLD COAG: NORMAL
WHOLE BLOOD HOLD SPECIMEN: NORMAL

## 2024-08-30 PROCEDURE — 83880 ASSAY OF NATRIURETIC PEPTIDE: CPT | Performed by: EMERGENCY MEDICINE

## 2024-08-30 PROCEDURE — 84484 ASSAY OF TROPONIN QUANT: CPT | Performed by: EMERGENCY MEDICINE

## 2024-08-30 PROCEDURE — 85379 FIBRIN DEGRADATION QUANT: CPT | Performed by: EMERGENCY MEDICINE

## 2024-08-30 PROCEDURE — 84484 ASSAY OF TROPONIN QUANT: CPT

## 2024-08-30 PROCEDURE — 93005 ELECTROCARDIOGRAM TRACING: CPT

## 2024-08-30 PROCEDURE — 93010 ELECTROCARDIOGRAM REPORT: CPT | Performed by: INTERNAL MEDICINE

## 2024-08-30 PROCEDURE — 99285 EMERGENCY DEPT VISIT HI MDM: CPT

## 2024-08-30 PROCEDURE — 85730 THROMBOPLASTIN TIME PARTIAL: CPT | Performed by: EMERGENCY MEDICINE

## 2024-08-30 PROCEDURE — 85025 COMPLETE CBC W/AUTO DIFF WBC: CPT

## 2024-08-30 PROCEDURE — 36415 COLL VENOUS BLD VENIPUNCTURE: CPT

## 2024-08-30 PROCEDURE — 93005 ELECTROCARDIOGRAM TRACING: CPT | Performed by: EMERGENCY MEDICINE

## 2024-08-30 PROCEDURE — 71045 X-RAY EXAM CHEST 1 VIEW: CPT

## 2024-08-30 PROCEDURE — 85610 PROTHROMBIN TIME: CPT | Performed by: EMERGENCY MEDICINE

## 2024-08-30 PROCEDURE — 71275 CT ANGIOGRAPHY CHEST: CPT

## 2024-08-30 PROCEDURE — 25510000001 IOPAMIDOL PER 1 ML: Performed by: STUDENT IN AN ORGANIZED HEALTH CARE EDUCATION/TRAINING PROGRAM

## 2024-08-30 PROCEDURE — 80053 COMPREHEN METABOLIC PANEL: CPT

## 2024-08-30 RX ORDER — KETOROLAC TROMETHAMINE 15 MG/ML
15 INJECTION, SOLUTION INTRAMUSCULAR; INTRAVENOUS ONCE
Status: DISCONTINUED | OUTPATIENT
Start: 2024-08-30 | End: 2024-08-31 | Stop reason: HOSPADM

## 2024-08-30 RX ORDER — SODIUM CHLORIDE 0.9 % (FLUSH) 0.9 %
10 SYRINGE (ML) INJECTION AS NEEDED
Status: DISCONTINUED | OUTPATIENT
Start: 2024-08-30 | End: 2024-08-31 | Stop reason: HOSPADM

## 2024-08-30 RX ORDER — DICLOFENAC SODIUM 75 MG/1
75 TABLET, DELAYED RELEASE ORAL 2 TIMES DAILY PRN
Qty: 30 TABLET | Refills: 0 | Status: SHIPPED | OUTPATIENT
Start: 2024-08-30 | End: 2024-09-03

## 2024-08-30 RX ORDER — ASPIRIN 325 MG
325 TABLET ORAL ONCE
Status: COMPLETED | OUTPATIENT
Start: 2024-08-30 | End: 2024-08-30

## 2024-08-30 RX ORDER — IOPAMIDOL 755 MG/ML
95 INJECTION, SOLUTION INTRAVASCULAR
Status: COMPLETED | OUTPATIENT
Start: 2024-08-30 | End: 2024-08-30

## 2024-08-30 RX ADMIN — IOPAMIDOL 95 ML: 755 INJECTION, SOLUTION INTRAVENOUS at 21:25

## 2024-08-30 RX ADMIN — ASPIRIN 325 MG: 325 TABLET ORAL at 19:14

## 2024-08-31 NOTE — ED PROVIDER NOTES
EMERGENCY DEPARTMENT ENCOUNTER    Room Number:    PCP: Laurie Wheatley APRN  History obtained from: Patient      HPI:  Chief Complaint: Chest pain  A complete HPI/ROS/PMH/PSH/SH/FH are unobtainable due to:   Context: Davida Quinonez is a 29 y.o. female who presents to the ED c/o chest pain.  Ongoing for last several days, worse with bending over.  No shortness of breath.  No nausea or vomiting.  Recently delivered via .  No cough.            PAST MEDICAL HISTORY  Active Ambulatory Problems     Diagnosis Date Noted    Bicornuate uterus 2020    PCOS (polycystic ovarian syndrome) 10/24/2022    Elevated hemoglobin A1c 2024    Status post bilateral salpingectomy 2024    Elevated liver function tests 2024     Resolved Ambulatory Problems     Diagnosis Date Noted    Depression 2016    Sleepiness 2017    Cough 2017    Missed  2020    Rh negative, antepartum 2020    Retained products of conception after miscarriage 2020    Spotting affecting pregnancy in first trimester 2020    Nausea and vomiting during pregnancy prior to 22 weeks gestation 2020    Evaluate anatomy not seen on prior sonogram 2020    Excessive weight gain during pregnancy 2020    Rh negative status during pregnancy 2021    Premature cervical dilation in third trimester 03/15/2021    Pregnancy 03/15/2021    Breech presentation of fetus 2021    Breech presentation, no version 2021    Gestational hypertension without significant proteinuria 2021    Obesity in pregnancy, antepartum 2021    Postpartum depression 2021    Rh negative status during pregnancy 2023    Missed  2023    S/P D&C (status post dilation and curettage) 2023    Acute appendicitis 2023    Obesity in pregnancy, antepartum 2024    Previous  delivery affecting pregnancy 2024    Request for  sterilization 2024    Maternal anemia in pregnancy, antepartum 2024    History of gestational hypertension 2024    LGA (large for gestational age) fetus affecting management of mother 2024    Polyhydramnios in third trimester 2024    Pregnancy 2024    Abnormal fetal ultrasound 2024     delivery delivered 2024     Past Medical History:   Diagnosis Date    Abnormal Pap smear of cervix     ADHD (attention deficit hyperactivity disorder)     Anxiety     Arthritis     Clinical diagnosis of COVID-19 2022    Gestational hypertension     HPV in female     Impingement syndrome of left shoulder     Miscarriage     Obesity 2008    Sleep apnea          PAST SURGICAL HISTORY  Past Surgical History:   Procedure Laterality Date    ADENOIDECTOMY      APPENDECTOMY N/A 2023    Procedure: APPENDECTOMY LAPAROSCOPIC;  Surgeon: Brandt Lemus Jr., MD;  Location: [facility];  Service: General;  Laterality: N/A;     SECTION N/A 2021    Procedure:  SECTION PRIMARY;  Surgeon: Yadi Barrera MD;  Location: [facility] LABOR DELIVERY;  Service: Obstetrics/Gynecology;  Laterality: N/A;     SECTION WITH TUBAL Bilateral 2024    Procedure:  SECTION REPEAT WITH TUBAL;  Surgeon: Aryan Myers MD;  Location: [facility] LABOR DELIVERY;  Service: Obstetrics/Gynecology;  Laterality: Bilateral;    D & C WITH SUCTION N/A 2020    Procedure: DILATATION AND CURETTAGE WITH SUCTION;  Surgeon: Yadi Barrera MD;  Location: [facility] OR;  Service: Obstetrics/Gynecology;  Laterality: N/A;    D & C WITH SUCTION N/A 2023    Procedure: DILATATION AND CURETTAGE WITH SUCTION;  Surgeon: Yadi Barrera MD;  Location: [facility] OR;  Service: Obstetrics/Gynecology;  Laterality: N/A;    EAR TUBES      JOINT MANIPULATION Left 2022    Procedure: LEFT SHOULDER MANIPULATION;  Surgeon: Navid Hinds MD;  Location: [facility];   Service: Orthopedics;  Laterality: Left;    LAPAROSCOPIC CHOLECYSTECTOMY      SHOULDER ARTHROSCOPY W/ SUPERIOR LABRAL ANTERIOR POSTERIOR REPAIR Left 03/29/2022    Procedure: LEFT SHOULDER ARTHROSCOPY LBARAL REPAIR WITH DISTAL CLAVICLE EXCISION AND SUBACROMIAL DECOMPRESSION;  Surgeon: Navid Hinds MD;  [place] [facility] [place] [facility] [place]  Service: Orthopedics;  Laterality: Left;    TONSILLECTOMY      WISDOM TOOTH EXTRACTION           FAMILY HISTORY  Family History   Problem Relation Age of Onset    Thyroid disease Mother     Depression Mother     Anxiety disorder Mother     Miscarriages / Stillbirths Mother     Vision loss Mother     Hypertension Father     Hyperlipidemia Father     Cancer Maternal Grandmother     Pancreatic cancer Maternal Grandmother     Pancreatitis Maternal Grandmother     No Known Problems Brother     No Known Problems Sister     Diabetes Maternal Grandfather     Ovarian cancer Other     Malig Hyperthermia Neg Hx     Breast cancer Neg Hx     Uterine cancer Neg Hx     Colon cancer Neg Hx          SOCIAL HISTORY  Social History     Socioeconomic History    Marital status:      Spouse name: adolfo    Number of children: 1   Tobacco Use    Smoking status: Never     Passive exposure: Never    Smokeless tobacco: Never   Vaping Use    Vaping status: Never Used   Substance and Sexual Activity    Alcohol use: No    Drug use: No    Sexual activity: Yes     Partners: Male     Birth control/protection: Tubal ligation         ALLERGIES  Trintellix [vortioxetine]        REVIEW OF SYSTEMS    As per HPI      PHYSICAL EXAM  ED Triage Vitals   Temp Heart Rate Resp BP SpO2   08/30/24 1727 08/30/24 1727 08/30/24 1727 08/30/24 1736 08/30/24 1727   98.4 °F (36.9 °C) 90 18 132/92 97 %      Temp src Heart Rate Source Patient Position BP Location FiO2 (%)   08/30/24 1727 08/30/24 1727 08/30/24 1736 08/30/24 1736 --   Tympanic Monitor Lying Right arm        Physical Exam  Constitutional:       General: She is not in  acute distress.  HENT:      Head: Normocephalic and atraumatic.   Cardiovascular:      Rate and Rhythm: Normal rate and regular rhythm.   Pulmonary:      Effort: Pulmonary effort is normal. No respiratory distress.   Abdominal:      General: There is no distension.      Palpations: Abdomen is soft.      Tenderness: There is no abdominal tenderness.   Musculoskeletal:         General: No swelling or deformity.   Skin:     General: Skin is warm and dry.   Neurological:      Mental Status: She is alert. Mental status is at baseline.           Vital signs and nursing notes reviewed.          LAB RESULTS  Recent Results (from the past 24 hour(s))   ECG 12 Lead ED Triage Standing Order; Chest Pain    Collection Time: 08/30/24  5:31 PM   Result Value Ref Range    QT Interval 367 ms    QTC Interval 439 ms   Comprehensive Metabolic Panel    Collection Time: 08/30/24  5:51 PM    Specimen: Blood   Result Value Ref Range    Glucose 116 (H) 65 - 99 mg/dL    BUN 13 6 - 20 mg/dL    Creatinine 0.74 0.57 - 1.00 mg/dL    Sodium 140 136 - 145 mmol/L    Potassium 4.2 3.5 - 5.2 mmol/L    Chloride 104 98 - 107 mmol/L    CO2 23.0 22.0 - 29.0 mmol/L    Calcium 9.9 8.6 - 10.5 mg/dL    Total Protein 7.0 6.0 - 8.5 g/dL    Albumin 4.5 3.5 - 5.2 g/dL    ALT (SGPT) 70 (H) 1 - 33 U/L    AST (SGOT) 38 (H) 1 - 32 U/L    Alkaline Phosphatase 131 (H) 39 - 117 U/L    Total Bilirubin 0.3 0.0 - 1.2 mg/dL    Globulin 2.5 gm/dL    A/G Ratio 1.8 g/dL    BUN/Creatinine Ratio 17.6 7.0 - 25.0    Anion Gap 13.0 5.0 - 15.0 mmol/L    eGFR 111.8 >60.0 mL/min/1.73   High Sensitivity Troponin T    Collection Time: 08/30/24  5:51 PM    Specimen: Blood   Result Value Ref Range    HS Troponin T <6 <14 ng/L   Green Top (Gel)    Collection Time: 08/30/24  5:51 PM   Result Value Ref Range    Extra Tube Hold for add-ons.    Lavender Top    Collection Time: 08/30/24  5:51 PM   Result Value Ref Range    Extra Tube hold for add-on    Gold Top - SST    Collection Time:  08/30/24  5:51 PM   Result Value Ref Range    Extra Tube Hold for add-ons.    Light Blue Top    Collection Time: 08/30/24  5:51 PM   Result Value Ref Range    Extra Tube Hold for add-ons.    CBC Auto Differential    Collection Time: 08/30/24  5:51 PM    Specimen: Blood   Result Value Ref Range    WBC 9.73 3.40 - 10.80 10*3/mm3    RBC 4.60 3.77 - 5.28 10*6/mm3    Hemoglobin 13.1 12.0 - 15.9 g/dL    Hematocrit 40.1 34.0 - 46.6 %    MCV 87.2 79.0 - 97.0 fL    MCH 28.5 26.6 - 33.0 pg    MCHC 32.7 31.5 - 35.7 g/dL    RDW 13.7 12.3 - 15.4 %    RDW-SD 42.8 37.0 - 54.0 fl    MPV 8.8 6.0 - 12.0 fL    Platelets 270 140 - 450 10*3/mm3    Neutrophil % 57.7 42.7 - 76.0 %    Lymphocyte % 31.8 19.6 - 45.3 %    Monocyte % 7.5 5.0 - 12.0 %    Eosinophil % 2.3 0.3 - 6.2 %    Basophil % 0.5 0.0 - 1.5 %    Immature Grans % 0.2 0.0 - 0.5 %    Neutrophils, Absolute 5.62 1.70 - 7.00 10*3/mm3    Lymphocytes, Absolute 3.09 0.70 - 3.10 10*3/mm3    Monocytes, Absolute 0.73 0.10 - 0.90 10*3/mm3    Eosinophils, Absolute 0.22 0.00 - 0.40 10*3/mm3    Basophils, Absolute 0.05 0.00 - 0.20 10*3/mm3    Immature Grans, Absolute 0.02 0.00 - 0.05 10*3/mm3    nRBC 0.0 0.0 - 0.2 /100 WBC   D-dimer, Quantitative    Collection Time: 08/30/24  5:51 PM    Specimen: Blood   Result Value Ref Range    D-Dimer, Quantitative 1.05 (H) 0.00 - 0.50 MCGFEU/mL   Protime-INR    Collection Time: 08/30/24  5:51 PM    Specimen: Blood   Result Value Ref Range    Protime 13.0 11.7 - 14.2 Seconds    INR 0.96 0.90 - 1.10   aPTT    Collection Time: 08/30/24  5:51 PM    Specimen: Blood   Result Value Ref Range    PTT 26.9 22.7 - 35.4 seconds   BNP    Collection Time: 08/30/24  5:51 PM    Specimen: Blood   Result Value Ref Range    proBNP <36.0 0.0 - 450.0 pg/mL   High Sensitivity Troponin T 2Hr    Collection Time: 08/30/24  7:16 PM    Specimen: Blood   Result Value Ref Range    HS Troponin T <6 <14 ng/L    Troponin T Delta         Ordered the above labs and reviewed the  results.        RADIOLOGY  CT Angiogram Chest Pulmonary Embolism    Result Date: 8/30/2024  CT ANGIOGRAM OF THE CHEST  HISTORY: Chest pain  COMPARISON: None available.  TECHNIQUE: Axial CT imaging was obtained through the thorax. IV contrast was administered. Three-D reformatted images were obtained.  FINDINGS: Exam is degraded by poor timing of the contrast bolus. No large central pulmonary thromboembolus is seen. Thoracic aorta is normal in caliber. No dissection is seen. Thyroid gland, trachea, and esophagus appear unremarkable. Mediastinal lymph nodes do not appear pathologically enlarged. No coronary artery calcifications are seen. No acute infiltrates are identified. No acute osseous abnormalities are seen. Images through the upper abdomen do not demonstrate any acute abnormalities. Gallbladder is absent.      Poor timing of the contrast bolus. No large central pulmonary thromboembolus is seen.    Radiation dose reduction techniques were utilized, including automated exposure control and exposure modulation based on body size.   This report was finalized on 8/30/2024 10:01 PM by Dr. China Trammell M.D on Workstation: [id number]      XR Chest 1 View    Result Date: 8/30/2024  XR CHEST 1 VW-  DATE OF EXAM: 8/30/2024 6:12 PM  INDICATION: Chest Pain Triage Protocol.  COMPARISON: Chest radiograph 3/15/2022. CT abdomen and pelvis 7/16/2023.  TECHNIQUE: A single portable AP view of the chest was obtained.  FINDINGS: Lordotic positioning. The lungs are clear. No pneumothorax. Cardiomediastinal contours within normal limits. No acute osseous abnormality is identified.      No active disease.  This report was finalized on 8/30/2024 6:56 PM by Jorge Maher MD on Workstation: [id number]       Ordered the above noted radiological studies. Reviewed by me in PACS.              MEDICATIONS GIVEN IN ER  Medications   sodium chloride 0.9 % flush 10 mL (has no administration in time range)   ketorolac (TORADOL) injection  15 mg (has no administration in time range)   aspirin tablet 325 mg (325 mg Oral Given 8/30/24 1914)   iopamidol (ISOVUE-370) 76 % injection 95 mL (95 mL Intravenous Given by Other 8/30/24 2125)               MEDICAL DECISION MAKING, PROGRESS, and CONSULTS    MDM: Patient presented emergency department with chest pain, unclear etiology.  Otherwise well-appearing, vitals otherwise stable.  Labs significant for slight elevation in D-dimer, proceeded to CT PE for further evaluation of the pulmonary vasculature.  No evidence of pulmonary embolism.  Patient heart rate improved at reevaluation.  Will treat with anti-inflammatories and discharge with outpatient follow-up.  Given return precautions and discharged home.    All labs have been independently reviewed by me.  All radiology studies have been reviewed by me and I have also reviewed the radiology report.   EKG's independently viewed and interpreted by me.  Discussion below represents my analysis of pertinent findings related to patient's condition, differential diagnosis, treatment plan and final disposition.      Additional sources:  - Discussed/ obtained information from independent historians:      - External (non-ED) record review:     - Chronic or social conditions impacting care:     - Shared decision making:        Orders placed during this visit:  Orders Placed This Encounter   Procedures    XR Chest 1 View    CT Angiogram Chest Pulmonary Embolism    [place] Draw    Comprehensive Metabolic Panel    High Sensitivity Troponin T    CBC Auto Differential    High Sensitivity Troponin T 2Hr    D-dimer, Quantitative    Protime-INR    aPTT    BNP    NPO Diet NPO Type: Strict NPO    Undress & Gown    Continuous Pulse Oximetry    Oxygen Therapy- Nasal Cannula; Titrate 1-6 LPM Per SpO2; 90 - 95%    ECG 12 Lead ED Triage Standing Order; Chest Pain    ECG 12 Lead ED Triage Standing Order; Chest Pain    Insert Peripheral IV    CBC & Differential    Green Top (Gel)     Lavender Top    Gold Top - SST    Light Blue Top         Additional orders considered but not ordered:  Considered lower extremity Dopplers however no signs or symptoms to suggest DVT.        Differential diagnosis includes but is not limited to:    Pulmonary embolism, pleuritis, pericarditis, pneumonia, viral syndrome, chest wall pain, postpartum cardiomyopathy, acute coronary syndrome, vasospasm      Independent interpretation of labs, radiology studies, and discussions with consultants:  ED Course as of 08/30/24 2241   Fri Aug 30, 2024   1844 First look: 28 yo F s/p C section 6 weeks ago here with central chest pain sharp and without shortness of breath.  No cardiac history, no pe, no dvt history.  Plan for basic labs incl bnp and ddimer. Pt states that she knows her LFTs are elevated.  No gi symptoms today.  EKG and cxr reviewed.  Likely destined for CTA chest but care will be assumed by oncoming provider. [AR]   1904 Chest x-ray interpreted myself:  No infiltrate or pneumothorax [FS]   1905 EKG interpreted myself:  1731, sinus rhythm at rate of 86, no acute ST segment changes or T wave inversions. [FS]   1944 HS Troponin T: <6 [FS]   2127 CTA chest interpreted myself:  No PE or infiltrate [FS]      ED Course User Index  [AR] Shawna Lozano MD  [FS] Bubba Stapleton MD           DIAGNOSIS  Final diagnoses:   Pleuritic chest pain         DISPOSITION  Discharged home        Latest Documented Vital Signs:  As of 22:41 EDT  BP- 104/64 HR- 56 Temp- 98.4 °F (36.9 °C) (Tympanic) O2 sat- 98%              --    Please note that portions of this were completed with a voice recognition program.       Note Disclaimer: At [facility], we believe that sharing information builds trust and better relationships. You are receiving this note because you are receiving care at [facility] or recently visited. It is possible you will see health information before a provider has talked with you about it. This kind of  information can be easy to misunderstand. To help you fully understand what it means for your health, we urge you to discuss this note with your provider.             Bubba Stapleton MD  08/30/24 0857

## 2024-09-03 ENCOUNTER — OFFICE VISIT (OUTPATIENT)
Dept: FAMILY MEDICINE CLINIC | Facility: CLINIC | Age: 29
End: 2024-09-03
Payer: COMMERCIAL

## 2024-09-03 VITALS
HEIGHT: 69 IN | HEART RATE: 67 BPM | OXYGEN SATURATION: 98 % | BODY MASS INDEX: 43.4 KG/M2 | WEIGHT: 293 LBS | SYSTOLIC BLOOD PRESSURE: 130 MMHG | RESPIRATION RATE: 18 BRPM | DIASTOLIC BLOOD PRESSURE: 90 MMHG

## 2024-09-03 DIAGNOSIS — R74.8 ELEVATED LIVER ENZYMES: Primary | ICD-10-CM

## 2024-09-03 PROCEDURE — 99214 OFFICE O/P EST MOD 30 MIN: CPT | Performed by: NURSE PRACTITIONER

## 2024-09-03 RX ORDER — PANTOPRAZOLE SODIUM 40 MG/1
40 TABLET, DELAYED RELEASE ORAL DAILY
Qty: 30 TABLET | Refills: 0 | Status: SHIPPED | OUTPATIENT
Start: 2024-09-03

## 2024-09-03 NOTE — PROGRESS NOTES
"Chief Complaint  Labs Only (Pt here to discuss lab results)    Subjective        Davida Quinonez presents to [facility]  History of Present Illness  History of Present Illness  The patient presents for evaluation of multiple medical concerns.    She reports elevated liver enzymes, as indicated by her recent blood work conducted last Tuesday. She does not consume alcohol and does not have a gallbladder.    She experienced chest pain on Friday, which led to a hospital visit. She suspects the pain might be due to anxiety or acid reflux, as it intensifies with caffeine consumption. The pain is described as a pressure sensation, similar to the feeling after an intense workout. She is currently on Zoloft for anxiety management. She has not had any colds recently and is not experiencing shortness of breath. A CT scan was performed during her hospital visit, which did not reveal any abnormalities.    Objective   Vital Signs:  /90   Pulse 67   Resp 18   Ht 175.3 cm (69\")   Wt (!) 141 kg (311 lb)   SpO2 98%   BMI 45.93 kg/m²   Estimated body mass index is 45.93 kg/m² as calculated from the following:    Height as of this encounter: 175.3 cm (69\").    Weight as of this encounter: 141 kg (311 lb).             Physical Exam  Constitutional:       General: She is not in acute distress.     Appearance: She is well-developed. She is not diaphoretic.   Cardiovascular:      Rate and Rhythm: Normal rate and regular rhythm.      Heart sounds: Normal heart sounds. No murmur heard.     No friction rub. No gallop.   Pulmonary:      Effort: Pulmonary effort is normal. No respiratory distress.      Breath sounds: Normal breath sounds. No wheezing or rales.   Musculoskeletal:      Cervical back: Neck supple.   Skin:     General: Skin is warm and dry.   Neurological:      Mental Status: She is alert and oriented to person, place, and time.       Physical Exam      Result Review " :          Results  Laboratory Studies  Liver enzymes were elevated. CMP glucose was 116.    Imaging  CAT scan results were normal.    Assessment & Plan  1. Elevated liver enzymes.  The elevated liver enzymes could be due to a viral infection, even without other symptoms. A repeat lab test will be ordered at [facility] in one or two weeks to monitor the liver enzyme levels.    2. Chest pain.  The chest pain may be related to anxiety or acid reflux. The dosage of Zoloft will be increased to 75 mg. A prescription for Protonix will be provided, to be taken first thing in the morning on an empty stomach. She is advised to reduce her caffeine intake, particularly avoiding caffeinated water with added vitamin C or citrus.    3. Anxiety.  The dosage of Zoloft will be increased to 75 mg to help manage anxiety symptoms. She is advised to monitor her symptoms and follow up if there is no improvement.    4. Medication Management.  A prescription for Zoloft 75 mg will be sent to [facility] with a 30-day supply and a couple of refills. If the medication works well, a 90-day supply can be considered.               Assessment and Plan     Diagnoses and all orders for this visit:    1. Elevated liver enzymes (Primary)  -     Comprehensive metabolic panel    Other orders  -     sertraline (ZOLOFT) 50 MG tablet; Take 1.5 tablets by mouth Every Night.  Dispense: 45 tablet; Refill: 2  -     pantoprazole (Protonix) 40 MG EC tablet; Take 1 tablet by mouth Daily.  Dispense: 30 tablet; Refill: 0             Follow Up     No follow-ups on file.  Patient was given instructions and counseling regarding her condition or for health maintenance advice. Please see specific information pulled into the AVS if appropriate.       Patient or patient representative verbalized consent for the use of Ambient Listening during the visit with  SCOTT Jones for chart documentation. 9/3/2024  12:08 EDT

## 2024-09-19 LAB
ALBUMIN SERPL-MCNC: 4.7 G/DL (ref 3.5–5.2)
ALBUMIN/GLOB SERPL: 2 G/DL
ALP SERPL-CCNC: 132 U/L (ref 39–117)
ALT SERPL-CCNC: 74 U/L (ref 1–33)
AST SERPL-CCNC: 46 U/L (ref 1–32)
BILIRUB SERPL-MCNC: 0.5 MG/DL (ref 0–1.2)
BUN SERPL-MCNC: 12 MG/DL (ref 6–20)
BUN/CREAT SERPL: 14.8 (ref 7–25)
CALCIUM SERPL-MCNC: 9.8 MG/DL (ref 8.6–10.5)
CHLORIDE SERPL-SCNC: 103 MMOL/L (ref 98–107)
CO2 SERPL-SCNC: 23.3 MMOL/L (ref 22–29)
CREAT SERPL-MCNC: 0.81 MG/DL (ref 0.57–1)
EGFRCR SERPLBLD CKD-EPI 2021: 100.3 ML/MIN/1.73
GLOBULIN SER CALC-MCNC: 2.3 GM/DL
GLUCOSE SERPL-MCNC: 93 MG/DL (ref 65–99)
POTASSIUM SERPL-SCNC: 4.6 MMOL/L (ref 3.5–5.2)
PROT SERPL-MCNC: 7 G/DL (ref 6–8.5)
SODIUM SERPL-SCNC: 140 MMOL/L (ref 136–145)

## 2024-09-20 DIAGNOSIS — R74.8 ELEVATED LIVER ENZYMES: Primary | ICD-10-CM

## 2024-09-23 ENCOUNTER — TELEPHONE (OUTPATIENT)
Dept: FAMILY MEDICINE CLINIC | Facility: CLINIC | Age: 29
End: 2024-09-23

## 2024-09-23 NOTE — TELEPHONE ENCOUNTER
Hub staff attempted to follow warm transfer process and was unsuccessful     Caller: Davida Quinonez    Relationship to patient: Self    Best call back number: [phone]     Patient is needing: PATIENT IS RETURNING A CALL FROM [facility]

## 2025-02-04 NOTE — PERIOPERATIVE NURSING NOTE
Dr. Barrera contacted to verify if pt's dose of Rhogam on 5/29/20 is still sufficient or if she would need an additional dose today.  Per Dr. Barrera, Rhogam provides coverage for 12 weeks, therefore there is NO need for Rhogam today.  Okay to discharge pt.   
How Severe Are Your Spot(S)?: moderate
What Is The Reason For Today's Visit?: Upper Body Skin Exam

## 2025-05-13 ENCOUNTER — OFFICE VISIT (OUTPATIENT)
Dept: OBSTETRICS AND GYNECOLOGY | Age: 30
End: 2025-05-13
Payer: COMMERCIAL

## 2025-05-13 VITALS
SYSTOLIC BLOOD PRESSURE: 110 MMHG | HEIGHT: 69 IN | BODY MASS INDEX: 43.4 KG/M2 | WEIGHT: 293 LBS | DIASTOLIC BLOOD PRESSURE: 80 MMHG

## 2025-05-13 DIAGNOSIS — E28.2 PCOS (POLYCYSTIC OVARIAN SYNDROME): ICD-10-CM

## 2025-05-13 DIAGNOSIS — Z01.419 ENCOUNTER FOR GYNECOLOGICAL EXAMINATION WITHOUT ABNORMAL FINDING: Primary | ICD-10-CM

## 2025-05-13 DIAGNOSIS — Z11.51 SPECIAL SCREENING EXAMINATION FOR HUMAN PAPILLOMAVIRUS (HPV): ICD-10-CM

## 2025-05-13 DIAGNOSIS — Z90.79 H/O BILATERAL SALPINGECTOMY: ICD-10-CM

## 2025-05-13 DIAGNOSIS — Z12.4 SCREENING FOR MALIGNANT NEOPLASM OF THE CERVIX: ICD-10-CM

## 2025-05-13 NOTE — PROGRESS NOTES
"Subjective   Davida Quinonez is a 30 y.o. female presents for  annual visit today , last pap 4/10/23 neg , Contraception: tubal ligation  , periods are normal , no problems today  , will restart metformin .  Boys are doing well. Kadeson just turned 3. Rigo is adorable.  Patient denies any complaints.  She is going to restart the metformin nightly.  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      History of Present Illness    The following portions of the patient's history were reviewed and updated as appropriate: allergies, current medications, past family history, past medical history, past social history, past surgical history, and problem list.    Review of Systems   Constitutional:  Negative for chills, fatigue and fever.   Gastrointestinal:  Negative for abdominal distention and abdominal pain.   Genitourinary:  Positive for menstrual problem. Negative for dysuria, pelvic pain, vaginal bleeding, vaginal discharge and vaginal pain.   All other systems reviewed and are negative.  /80   Ht 175.3 cm (69\")   Wt (!) 140 kg (309 lb)   LMP 03/01/2025 (Approximate)   BMI 45.63 kg/m²       Objective   Physical Exam  Vitals and nursing note reviewed.   Constitutional:       Appearance: She is well-developed. She is obese.   Neck:      Thyroid: No thyromegaly.   Pulmonary:      Effort: Pulmonary effort is normal.   Chest:   Breasts:     Right: No mass, nipple discharge, skin change or tenderness.      Left: No mass, nipple discharge, skin change or tenderness.   Abdominal:      General: There is no distension.      Palpations: Abdomen is soft.      Tenderness: There is no abdominal tenderness.   Genitourinary:     General: Normal vulva.      Exam position: Lithotomy position.      Labia:         Right: No rash or lesion.         Left: No rash or lesion.       Vagina: Normal. No vaginal discharge or bleeding.      Cervix: No friability or lesion.      Uterus: Not enlarged and not tender.  "      Adnexa:         Right: No mass or tenderness.          Left: No mass or tenderness.     Musculoskeletal:         General: Normal range of motion.      Cervical back: Normal range of motion.   Skin:     General: Skin is warm and dry.      Findings: No rash.   Neurological:      Mental Status: She is alert and oriented to person, place, and time.   Psychiatric:         Mood and Affect: Mood normal.         Behavior: Behavior normal.           Assessment & Plan   Diagnoses and all orders for this visit:    1. Encounter for gynecological examination without abnormal finding (Primary)  -     IgP, Aptima HPV    2. PCOS (polycystic ovarian syndrome)    3. Screening for malignant neoplasm of the cervix  -     IgP, Aptima HPV    4. Special screening examination for human papillomavirus (HPV)  -     IgP, Aptima HPV    5. H/O bilateral salpingectomy        Counseling was given to patient for the following topics: instructions for management, importance of treatment compliance, and self-breast exams  .   Return in about 1 year (around 5/13/2026) for Annual physical.

## 2025-05-15 LAB
CYTOLOGIST CVX/VAG CYTO: NORMAL
CYTOLOGY CVX/VAG DOC CYTO: NORMAL
CYTOLOGY CVX/VAG DOC THIN PREP: NORMAL
DX ICD CODE: NORMAL
HPV I/H RISK 4 DNA CVX QL PROBE+SIG AMP: NEGATIVE
OTHER STN SPEC: NORMAL
SERVICE CMNT-IMP: NORMAL
STAT OF ADQ CVX/VAG CYTO-IMP: NORMAL

## (undated) DEVICE — PK ARTHSCP SHLDR TOWER 40

## (undated) DEVICE — SUTURELASSO STR 90D  AR406890

## (undated) DEVICE — GLV SURG BIOGEL LTX PF 6 1/2

## (undated) DEVICE — GLV SURG SIGNATURE ESSENTIAL PF LTX SZ7.5

## (undated) DEVICE — ST COL BERKELY TBG

## (undated) DEVICE — SUT MNCRYL 0/0 CTX 36IN Y398H

## (undated) DEVICE — DRSNG WND GZ CURAD OIL EMULSION 3X3IN STRL

## (undated) DEVICE — GLV SURG SIGNATURE ESSENTIAL PF LTX SZ6.5

## (undated) DEVICE — TBG W FLTR FOR BERKELEY SYSTEM

## (undated) DEVICE — Device

## (undated) DEVICE — SUT ETHLN 3/0 PC5 18IN 1893G

## (undated) DEVICE — SOL ISO/ALC RUB 70PCT 4OZ

## (undated) DEVICE — ANTIBACTERIAL UNDYED BRAIDED (POLYGLACTIN 910), SYNTHETIC ABSORBABLE SUTURE: Brand: COATED VICRYL

## (undated) DEVICE — CANSTR SXN UTER NO FLTR SURG

## (undated) DEVICE — SUT MNCRYL 3/0 CT1 36 IN Y944H

## (undated) DEVICE — DRAPE,U/ SHT,SPLIT,PLAS,STERIL: Brand: MEDLINE

## (undated) DEVICE — SYR LUERLOK 5CC

## (undated) DEVICE — SUT VIC 0 CT 36IN J958H

## (undated) DEVICE — BLD SHAVER RESEC SABRE COOLCUT 5MM 13CM

## (undated) DEVICE — CANN TRPL DAM 7X7MM NO VLV

## (undated) DEVICE — SUT MNCRYL 4/0 PS2 18 IN

## (undated) DEVICE — LOU D & C HYSTEROSCOPY: Brand: MEDLINE INDUSTRIES, INC.

## (undated) DEVICE — SPNG GZ WOVN 4X4IN 12PLY 10/BX STRL

## (undated) DEVICE — TRAXI PANNICULUS RETRACTOR WITH RETENTUS TECHNOLOGY (BMI 30-50): Brand: TRAXI® PANNICULUS RETRACTOR

## (undated) DEVICE — SACK GZ PERM

## (undated) DEVICE — STRAP STIRUP WO/ RNG

## (undated) DEVICE — HOSE BT TO BT VAC CURETTAGE SNGL PT USE

## (undated) DEVICE — ARM SLING: Brand: DEROYAL

## (undated) DEVICE — BNDG ADHS PLSTC 1X3IN LF

## (undated) DEVICE — GLV SURG BIOGEL LTX PF 8

## (undated) DEVICE — ABL ASP APOLLO RF XL 90D

## (undated) DEVICE — SLV SCD KN ADJ EXPRSS LG

## (undated) DEVICE — SKIN PREP TRAY W/CHG: Brand: MEDLINE INDUSTRIES, INC.

## (undated) DEVICE — SOL IRR H2O BTL 1000ML STRL

## (undated) DEVICE — HOSE BT TO BT VAC CURETTAGE SNGL PT USE EA/10

## (undated) DEVICE — 3M™ TEGADERM™ TRANSPARENT FILM DRESSING FRAME STYLE, 1627, 4 IN X 10 IN (10 CM X 25 CM), 20/CT 4CT/CASE: Brand: 3M™ TEGADERM™

## (undated) DEVICE — TUBING SET, GRAVITY, 4-SPIKE

## (undated) DEVICE — PICO 7 10CM X 30CM: Brand: PICO™ 7

## (undated) DEVICE — POSTN ARMSLV LAT/TRACTION DISP

## (undated) DEVICE — 3M(TM) TEGADERM(TM) TRANSPARENT FILM DRESSING FRAME STYLE 1627: Brand: 3M™ TEGADERM™

## (undated) DEVICE — BLANKT WARM LOWR/BDY 100X120CM

## (undated) DEVICE — DRL RIGD JUGGERKNOT SFTANCHR GLD

## (undated) DEVICE — NDL HYPO ECLPS SFTY 22G 1 1/2IN